# Patient Record
Sex: FEMALE | Race: OTHER | NOT HISPANIC OR LATINO | URBAN - METROPOLITAN AREA
[De-identification: names, ages, dates, MRNs, and addresses within clinical notes are randomized per-mention and may not be internally consistent; named-entity substitution may affect disease eponyms.]

---

## 2014-11-18 RX ORDER — INSULIN GLARGINE 100 [IU]/ML
12 INJECTION, SOLUTION SUBCUTANEOUS
Qty: 0 | Refills: 0 | DISCHARGE
Start: 2014-11-18

## 2017-10-29 ENCOUNTER — INPATIENT (INPATIENT)
Facility: HOSPITAL | Age: 82
LOS: 1 days | Discharge: HOME HEALTH | End: 2017-10-31
Attending: INTERNAL MEDICINE | Admitting: INTERNAL MEDICINE
Payer: MEDICARE

## 2017-10-29 VITALS
WEIGHT: 169.98 LBS | RESPIRATION RATE: 17 BRPM | HEART RATE: 72 BPM | SYSTOLIC BLOOD PRESSURE: 180 MMHG | OXYGEN SATURATION: 100 % | HEIGHT: 60 IN | TEMPERATURE: 99 F | DIASTOLIC BLOOD PRESSURE: 77 MMHG

## 2017-10-29 DIAGNOSIS — E03.9 HYPOTHYROIDISM, UNSPECIFIED: ICD-10-CM

## 2017-10-29 DIAGNOSIS — E66.09 OTHER OBESITY DUE TO EXCESS CALORIES: ICD-10-CM

## 2017-10-29 DIAGNOSIS — Z29.9 ENCOUNTER FOR PROPHYLACTIC MEASURES, UNSPECIFIED: ICD-10-CM

## 2017-10-29 DIAGNOSIS — E11.9 TYPE 2 DIABETES MELLITUS WITHOUT COMPLICATIONS: ICD-10-CM

## 2017-10-29 DIAGNOSIS — Z90.49 ACQUIRED ABSENCE OF OTHER SPECIFIED PARTS OF DIGESTIVE TRACT: Chronic | ICD-10-CM

## 2017-10-29 DIAGNOSIS — I10 ESSENTIAL (PRIMARY) HYPERTENSION: ICD-10-CM

## 2017-10-29 DIAGNOSIS — E78.2 MIXED HYPERLIPIDEMIA: ICD-10-CM

## 2017-10-29 DIAGNOSIS — I63.9 CEREBRAL INFARCTION, UNSPECIFIED: ICD-10-CM

## 2017-10-29 LAB
ALBUMIN SERPL ELPH-MCNC: 3.4 G/DL — SIGNIFICANT CHANGE UP (ref 3.3–5)
ALP SERPL-CCNC: 236 U/L — HIGH (ref 40–120)
ALT FLD-CCNC: 35 U/L — SIGNIFICANT CHANGE UP (ref 12–78)
ANION GAP SERPL CALC-SCNC: 10 MMOL/L — SIGNIFICANT CHANGE UP (ref 5–17)
APTT BLD: 31 SEC — SIGNIFICANT CHANGE UP (ref 27.5–37.4)
AST SERPL-CCNC: 27 U/L — SIGNIFICANT CHANGE UP (ref 15–37)
BASOPHILS # BLD AUTO: 0.1 K/UL — SIGNIFICANT CHANGE UP (ref 0–0.2)
BASOPHILS NFR BLD AUTO: 1.5 % — SIGNIFICANT CHANGE UP (ref 0–2)
BILIRUB SERPL-MCNC: 0.4 MG/DL — SIGNIFICANT CHANGE UP (ref 0.2–1.2)
BUN SERPL-MCNC: 28 MG/DL — HIGH (ref 7–23)
CALCIUM SERPL-MCNC: 9 MG/DL — SIGNIFICANT CHANGE UP (ref 8.5–10.1)
CHLORIDE SERPL-SCNC: 102 MMOL/L — SIGNIFICANT CHANGE UP (ref 96–108)
CK MB BLD-MCNC: 3.7 % — HIGH (ref 0–3.5)
CK MB CFR SERPL CALC: 3.3 NG/ML — SIGNIFICANT CHANGE UP (ref 0.5–3.6)
CK SERPL-CCNC: 89 U/L — SIGNIFICANT CHANGE UP (ref 26–192)
CO2 SERPL-SCNC: 28 MMOL/L — SIGNIFICANT CHANGE UP (ref 22–31)
CREAT SERPL-MCNC: 1.48 MG/DL — HIGH (ref 0.5–1.3)
EOSINOPHIL # BLD AUTO: 0.2 K/UL — SIGNIFICANT CHANGE UP (ref 0–0.5)
EOSINOPHIL NFR BLD AUTO: 2.6 % — SIGNIFICANT CHANGE UP (ref 0–6)
GLUCOSE BLDC GLUCOMTR-MCNC: 117 MG/DL — HIGH (ref 70–99)
GLUCOSE SERPL-MCNC: 133 MG/DL — HIGH (ref 70–99)
HCT VFR BLD CALC: 44.3 % — SIGNIFICANT CHANGE UP (ref 34.5–45)
HGB BLD-MCNC: 14.2 G/DL — SIGNIFICANT CHANGE UP (ref 11.5–15.5)
INR BLD: 1.1 RATIO — SIGNIFICANT CHANGE UP (ref 0.88–1.16)
LYMPHOCYTES # BLD AUTO: 2.7 K/UL — SIGNIFICANT CHANGE UP (ref 1–3.3)
LYMPHOCYTES # BLD AUTO: 34.9 % — SIGNIFICANT CHANGE UP (ref 13–44)
MCHC RBC-ENTMCNC: 26.8 PG — LOW (ref 27–34)
MCHC RBC-ENTMCNC: 32.1 GM/DL — SIGNIFICANT CHANGE UP (ref 32–36)
MCV RBC AUTO: 83.4 FL — SIGNIFICANT CHANGE UP (ref 80–100)
MONOCYTES # BLD AUTO: 0.6 K/UL — SIGNIFICANT CHANGE UP (ref 0–0.9)
MONOCYTES NFR BLD AUTO: 7.3 % — SIGNIFICANT CHANGE UP (ref 2–14)
NEUTROPHILS # BLD AUTO: 4.1 K/UL — SIGNIFICANT CHANGE UP (ref 1.8–7.4)
NEUTROPHILS NFR BLD AUTO: 53.7 % — SIGNIFICANT CHANGE UP (ref 43–77)
PLATELET # BLD AUTO: 308 K/UL — SIGNIFICANT CHANGE UP (ref 150–400)
POTASSIUM SERPL-MCNC: 5.2 MMOL/L — SIGNIFICANT CHANGE UP (ref 3.5–5.3)
POTASSIUM SERPL-SCNC: 5.2 MMOL/L — SIGNIFICANT CHANGE UP (ref 3.5–5.3)
PROT SERPL-MCNC: 9.7 GM/DL — HIGH (ref 6–8.3)
PROTHROM AB SERPL-ACNC: 12 SEC — SIGNIFICANT CHANGE UP (ref 9.8–12.7)
RBC # BLD: 5.32 M/UL — HIGH (ref 3.8–5.2)
RBC # FLD: 13 % — SIGNIFICANT CHANGE UP (ref 11–15)
SODIUM SERPL-SCNC: 140 MMOL/L — SIGNIFICANT CHANGE UP (ref 135–145)
TROPONIN I SERPL-MCNC: 0.02 NG/ML — SIGNIFICANT CHANGE UP (ref 0.01–0.04)
WBC # BLD: 7.6 K/UL — SIGNIFICANT CHANGE UP (ref 3.8–10.5)
WBC # FLD AUTO: 7.6 K/UL — SIGNIFICANT CHANGE UP (ref 3.8–10.5)

## 2017-10-29 PROCEDURE — 71010: CPT | Mod: 26

## 2017-10-29 PROCEDURE — 70450 CT HEAD/BRAIN W/O DYE: CPT | Mod: 26

## 2017-10-29 PROCEDURE — 99223 1ST HOSP IP/OBS HIGH 75: CPT

## 2017-10-29 PROCEDURE — 99285 EMERGENCY DEPT VISIT HI MDM: CPT

## 2017-10-29 RX ORDER — SIMVASTATIN 20 MG/1
40 TABLET, FILM COATED ORAL AT BEDTIME
Qty: 0 | Refills: 0 | Status: DISCONTINUED | OUTPATIENT
Start: 2017-10-29 | End: 2017-10-31

## 2017-10-29 RX ORDER — HEPARIN SODIUM 5000 [USP'U]/ML
5000 INJECTION INTRAVENOUS; SUBCUTANEOUS EVERY 8 HOURS
Qty: 0 | Refills: 0 | Status: DISCONTINUED | OUTPATIENT
Start: 2017-10-29 | End: 2017-10-31

## 2017-10-29 RX ORDER — DEXTROSE 50 % IN WATER 50 %
1 SYRINGE (ML) INTRAVENOUS ONCE
Qty: 0 | Refills: 0 | Status: DISCONTINUED | OUTPATIENT
Start: 2017-10-29 | End: 2017-10-31

## 2017-10-29 RX ORDER — INSULIN GLARGINE 100 [IU]/ML
40 INJECTION, SOLUTION SUBCUTANEOUS AT BEDTIME
Qty: 0 | Refills: 0 | Status: DISCONTINUED | OUTPATIENT
Start: 2017-10-29 | End: 2017-10-30

## 2017-10-29 RX ORDER — INSULIN LISPRO 100/ML
VIAL (ML) SUBCUTANEOUS
Qty: 0 | Refills: 0 | Status: DISCONTINUED | OUTPATIENT
Start: 2017-10-29 | End: 2017-10-31

## 2017-10-29 RX ORDER — ASPIRIN/CALCIUM CARB/MAGNESIUM 324 MG
325 TABLET ORAL ONCE
Qty: 0 | Refills: 0 | Status: COMPLETED | OUTPATIENT
Start: 2017-10-29 | End: 2017-10-29

## 2017-10-29 RX ORDER — ASPIRIN/CALCIUM CARB/MAGNESIUM 324 MG
325 TABLET ORAL DAILY
Qty: 0 | Refills: 0 | Status: DISCONTINUED | OUTPATIENT
Start: 2017-10-29 | End: 2017-10-31

## 2017-10-29 RX ORDER — SODIUM CHLORIDE 9 MG/ML
3 INJECTION INTRAMUSCULAR; INTRAVENOUS; SUBCUTANEOUS ONCE
Qty: 0 | Refills: 0 | Status: COMPLETED | OUTPATIENT
Start: 2017-10-29 | End: 2017-10-29

## 2017-10-29 RX ORDER — DEXTROSE 50 % IN WATER 50 %
12.5 SYRINGE (ML) INTRAVENOUS ONCE
Qty: 0 | Refills: 0 | Status: DISCONTINUED | OUTPATIENT
Start: 2017-10-29 | End: 2017-10-31

## 2017-10-29 RX ORDER — DEXTROSE 50 % IN WATER 50 %
25 SYRINGE (ML) INTRAVENOUS ONCE
Qty: 0 | Refills: 0 | Status: DISCONTINUED | OUTPATIENT
Start: 2017-10-29 | End: 2017-10-31

## 2017-10-29 RX ORDER — DULOXETINE HYDROCHLORIDE 30 MG/1
60 CAPSULE, DELAYED RELEASE ORAL DAILY
Qty: 0 | Refills: 0 | Status: DISCONTINUED | OUTPATIENT
Start: 2017-10-29 | End: 2017-10-31

## 2017-10-29 RX ORDER — GLUCAGON INJECTION, SOLUTION 0.5 MG/.1ML
1 INJECTION, SOLUTION SUBCUTANEOUS ONCE
Qty: 0 | Refills: 0 | Status: DISCONTINUED | OUTPATIENT
Start: 2017-10-29 | End: 2017-10-31

## 2017-10-29 RX ORDER — LEVOTHYROXINE SODIUM 125 MCG
88 TABLET ORAL DAILY
Qty: 0 | Refills: 0 | Status: DISCONTINUED | OUTPATIENT
Start: 2017-10-29 | End: 2017-10-31

## 2017-10-29 RX ORDER — SODIUM CHLORIDE 9 MG/ML
1000 INJECTION, SOLUTION INTRAVENOUS
Qty: 0 | Refills: 0 | Status: DISCONTINUED | OUTPATIENT
Start: 2017-10-29 | End: 2017-10-31

## 2017-10-29 RX ORDER — INSULIN LISPRO 100/ML
VIAL (ML) SUBCUTANEOUS AT BEDTIME
Qty: 0 | Refills: 0 | Status: DISCONTINUED | OUTPATIENT
Start: 2017-10-29 | End: 2017-10-31

## 2017-10-29 RX ADMIN — Medication 325 MILLIGRAM(S): at 20:03

## 2017-10-29 RX ADMIN — HEPARIN SODIUM 5000 UNIT(S): 5000 INJECTION INTRAVENOUS; SUBCUTANEOUS at 22:03

## 2017-10-29 RX ADMIN — SIMVASTATIN 40 MILLIGRAM(S): 20 TABLET, FILM COATED ORAL at 22:04

## 2017-10-29 RX ADMIN — SODIUM CHLORIDE 3 MILLILITER(S): 9 INJECTION INTRAMUSCULAR; INTRAVENOUS; SUBCUTANEOUS at 19:30

## 2017-10-29 RX ADMIN — INSULIN GLARGINE 40 UNIT(S): 100 INJECTION, SOLUTION SUBCUTANEOUS at 22:04

## 2017-10-29 NOTE — H&P ADULT - PROBLEM SELECTOR PLAN 1
Telemetry monitoring  Neurochecks q2hrs.  Symptoms mostly resolved, would still allow for permissive HTN at this point  TTE, carotid doppler  STAT CT head if change in neuro status  Continue simvastatin  Aspiration, Fall, Seizure precautions.  Neurology consult  PT Evaluation.  CT Head reveals no hemorrhage   MRI brain  Send HbA1c, TSH

## 2017-10-29 NOTE — ED PROVIDER NOTE - OBJECTIVE STATEMENT
88 year old female with h/o HTN, DM, TIA, hypothyroidism and hypercholesterolemia presents today BIBA from home accompanied with her grand daughter who states that the patient was last normal at 5:35pm, she was coming out of the shower when she noticed her speech did not sound right, pt had slurred speech, left facial droop and left sided weakness, pt arrived to the ER back to her baseline (-) chest pain (-) sob (-) headache or dizziness, in triage pt is able to follow commands, speech is clear, (-) drift and (-)facial droop

## 2017-10-29 NOTE — H&P ADULT - ASSESSMENT
88 year old right-handed woman with PMH tia, DM2, HTN, HLD, hypothyroidism, presents with complaint of LUE and LLE weakness with facial droop earlier this morning.  Signs, symptoms, and work up consistent with CVA outside window for tPA.  Patient will require admission for at least 2 midnights as detailed below:    IMPROVE VTE Individual Risk Assessment          RISK                                                          Points    [  ] Previous VTE                                                3    [  ] Thrombophilia                                             2    [  ] Lower limb paralysis                                    2        (unable to hold up >15 seconds)      [  ] Current Cancer                                             2         (within 6 months)    [ x ] Immobilization > 24 hrs                              1    [  ] ICU/CCU stay > 24 hours                            1    [ x ] Age > 60                                                    1    IMPROVE VTE Score _____2____

## 2017-10-29 NOTE — H&P ADULT - NSHPLABSRESULTS_GEN_ALL_CORE
Vital Signs Last 24 Hrs  T(C): 37.3 (29 Oct 2017 18:47), Max: 37.3 (29 Oct 2017 18:47)  T(F): 99.1 (29 Oct 2017 18:47), Max: 99.1 (29 Oct 2017 18:47)  HR: 81 (29 Oct 2017 20:28) (72 - 81)  BP: 161/92 (29 Oct 2017 20:28) (161/92 - 180/77)  BP(mean): --  RR: 18 (29 Oct 2017 20:28) (17 - 20)  SpO2: 97% (29 Oct 2017 20:28) (97% - 100%)          LABS:                        14.2   7.6   )-----------( 308      ( 29 Oct 2017 19:32 )             44.3     10-29    140  |  102  |  28<H>  ----------------------------<  133<H>  5.2   |  28  |  1.48<H>    Ca    9.0      29 Oct 2017 19:32    TPro  9.7<H>  /  Alb  3.4  /  TBili  0.4  /  DBili  x   /  AST  27  /  ALT  35  /  AlkPhos  236<H>  10-29    PT/INR - ( 29 Oct 2017 19:32 )   PT: 12.0 sec;   INR: 1.10 ratio         PTT - ( 29 Oct 2017 19:32 )  PTT:31.0 sec      RADIOLOGY & ADDITIONAL STUDIES:    CT head:  IMPRESSION:  Stable head CT. No acute intracranial hemorrhage, mass effect or evidence   of acute territorial infarct.

## 2017-10-29 NOTE — ED ADULT NURSE NOTE - OBJECTIVE STATEMENT
pt presents to  the ED states she had an episode of the left side of her mouth looking "weird" and her friend said she was having a stroke. Pt recalls everything, states she grew very weak and her grandaughter helped her to the chair. She also noted she was dropping things out of her left hand. pt was brought emergently to CT scan. Vitals as noted upon return. 20 ga IV placed in left AC. Labs drawn and sent. Speaks full sentences without slurring. no facial droop noted. Good equal bilat  strength. No arm drift. Sinus arrhythmia noted with first degree HB. will continue to monitor awaiting further orders and results

## 2017-10-29 NOTE — ED ADULT TRIAGE NOTE - CHIEF COMPLAINT QUOTE
at 17:30 family noticed left facial dropping and slurred speech  upon arrival patient moves all extremities speech clear no acute deviation to facial muscles.  ems noted resolved symptoms, Md at triage for evaluation  fs by ems 102

## 2017-10-29 NOTE — H&P ADULT - NSHPPHYSICALEXAM_GEN_ALL_CORE
GENERAL: NAD, well-groomed, well-developed  HEAD:  Atraumatic, Normocephalic  EYES: EOMI, PERRLA, conjunctiva and sclera clear  ENMT: No tonsillar erythema, exudates, or enlargement; Moist mucous membranes, No lesions  NECK: Supple, No JVD, Normal thyroid  NERVOUS SYSTEM:  Alert & Oriented X3, Good concentration DTRs 2+ intact and symmetric, strength 4/5 LUE, 5/5 throughout, CN 2-12 intact  CHEST/LUNG: Clear to auscultation bilaterally; No rales, rhonchi, wheezing, or rubs  HEART: Regular rate and rhythm; No murmurs, rubs, or gallops  ABDOMEN: Soft, Nontender, Nondistended; Bowel sounds present  EXTREMITIES: No clubbing, cyanosis, or edema  SKIN: no rashes or lesions   PSYCH: normal affect and behavior GENERAL: NAD, well-groomed, obese  HEAD:  Atraumatic, Normocephalic  EYES: EOMI, PERRLA, conjunctiva and sclera clear  ENMT: No tonsillar erythema, exudates, or enlargement; Moist mucous membranes, No lesions  NECK: Supple, No JVD, Normal thyroid  NERVOUS SYSTEM:  Alert & Oriented X3, Good concentration DTRs 2+ intact and symmetric, strength 4/5 LUE, 5/5 throughout, CN 2-12 intact  CHEST/LUNG: Clear to auscultation bilaterally; No rales, rhonchi, wheezing, or rubs  HEART: Regular rate and rhythm; No murmurs, rubs, or gallops  ABDOMEN: Soft, Nontender, Nondistended; Bowel sounds present  EXTREMITIES: No clubbing, cyanosis, or edema  SKIN: no rashes or lesions   PSYCH: normal affect and behavior

## 2017-10-29 NOTE — H&P ADULT - NSHPREVIEWOFSYSTEMS_GEN_ALL_CORE
No fever/chills, No photophobia/eye pain,  No chest pain/palpitations, no SOB/cough/wheeze/stridor, No abdominal pain, No N/V/D, no dysuria/frequency/discharge, No neck/back pain, no rash.

## 2017-10-29 NOTE — H&P ADULT - HISTORY OF PRESENT ILLNESS
88 year old right-handed woman with PMH tia, DM2, HTN, HLD, hypothyroidism, presents with complaint of LUE and LLE weakness with facial droop earlier this morning.  She says she was in the bathroom and was about to take a shower when she noticed a feeling of being weak, unsteady on her feet, and was unable to hold tightly to a brush in her left hand.  She also noticed blurred vision and had a headache.  Her granddaughter (not present in ED) told the patient her face was drooping to the left.  Patient says she did not want to come to the ED right away and wanted to wait, then agreed to come.  She says her symptoms have mostly resolved but she still feels weak.  She denies tremors, incontinence, LOC, dizziness, chest pain, palpitations, diaphoresis.    In the ED, EKG shows RBBB, CT head shows no acute changes, cardiac enzymes normal.

## 2017-10-29 NOTE — ED PROVIDER NOTE - PROGRESS NOTE DETAILS
dr espinoza called and made aware of the consult Patient signed out to my care pending labs.  Labs, CT, EKG reviewed, admit for CVA evaluation.  No tpa given age and resolution of symptoms.  Patient is to be admitted to the hospital and the case was discussed with the admitting physician.  Any changes in plan, additional imaging/labs, and further work up will be at the discretion of the admitting physician.

## 2017-10-29 NOTE — H&P ADULT - PMH
DM (diabetes mellitus)    HLD (hyperlipidemia)    HTN (hypertension)    Hypothyroidism    TIA (transient ischemic attack)

## 2017-10-29 NOTE — CONSULT NOTE ADULT - SUBJECTIVE AND OBJECTIVE BOX
Historian:  Patient.   ER notes also reviewed.  HPI:    GENARO PATEL... 88 year old right-handed woman with PMH tia, DM2, HTN, HLD, hypothyroidism, presents with complaint of LUE and LLE weakness with facial droop earlier this morning.  She says she was in the bathroom and was about to take a shower when she noticed a feeling of being weak, unsteady on her feet, and was unable to hold tightly to a brush in her left hand.  She also noticed blurred vision and had a headache.  Her granddaughter (not present in ED) told the patient her face was drooping to the left.  Patient says she did not want to come to the ED right away and wanted to wait, then agreed to come.  She says her symptoms have mostly resolved but she still feels weak.  She denies tremors, incontinence, LOC, dizziness, chest pain, palpitations, diaphoresis.    In the ED, EKG shows RBBB, CT head shows no acute changes, cardiac enzymes normal. (29 Oct 2017 21:22)    PAST MEDICAL & SURGICAL HISTORY:  TIA (transient ischemic attack); Hypothyroidism;DM (diabetes mellitus); HLD (hyperlipidemia); HTN (hypertension); S/P cholecystectomy    MEDICATIONS  (STANDING):  aspirin 325 milliGRAM(s) Oral daily  dextrose 5%. 1000 milliLiter(s) (50 mL/Hr) IV Continuous <Continuous>  dextrose 50% Injectable 12.5 Gram(s) IV Push once  dextrose 50% Injectable 25 Gram(s) IV Push once  dextrose 50% Injectable 25 Gram(s) IV Push once  DULoxetine 60 milliGRAM(s) Oral daily  heparin  Injectable 5000 Unit(s) SubCutaneous every 8 hours  insulin glargine Injectable (LANTUS) 40 Unit(s) SubCutaneous at bedtime  insulin lispro (HumaLOG) corrective regimen sliding scale   SubCutaneous three times a day before meals  insulin lispro (HumaLOG) corrective regimen sliding scale   SubCutaneous at bedtime  levothyroxine 88 MICROGram(s) Oral daily  simvastatin 40 milliGRAM(s) Oral at bedtime    MEDICATIONS  (PRN):  dextrose Gel 1 Dose(s) Oral once PRN Blood Glucose LESS THAN 70 milliGRAM(s)/deciliter  glucagon  Injectable 1 milliGRAM(s) IntraMuscular once PRN Glucose LESS THAN 70 milligrams/deciliter    Allergies: No Known Allergies  Intolerances  FAMILY HISTORY:  Family history unknown    Vital Signs Last 24 Hrs  T(C): 37.3 (29 Oct 2017 18:47), Max: 37.3 (29 Oct 2017 18:47)  T(F): 99.1 (29 Oct 2017 18:47), Max: 99.1 (29 Oct 2017 18:47)  HR: 84 (29 Oct 2017 22:07) (72 - 84)  BP: 171/67 (29 Oct 2017 22:07) (161/92 - 180/77)  BP(mean): --  RR: 16 (29 Oct 2017 22:07) (16 - 20)  SpO2: 95% (29 Oct 2017 22:07) (95% - 100%)    NEUROLOGICAL EXAM:  HENT:  Normocephalic head; atraumatic head.  Neck supple.  ENT: normal looking.  Mental State:    Awake  Fully oriented to person, place and date.  Coherent.  Speech clear and intact.  Cooperative.  Responds appropriately.    Cranial Nerves:  II-XII:   Pupils round and reactive to light and accommodation.  Extraocular movements full.  Visual fields full (no homonymous hemianopsia).  Visual acuity wnl.  Facial symmetry intact.  Tongue midline.  Motor Functions:  Moves all extremities.  No pronator drift of UE.  Claps hands well.  Hand  intact bilaterally.      Sensory Functions:   Intact to touch and pinprick to face and extremities.    Reflexes:  Deep tendon reflexes normoactive to knees and ankles.  Babinski absent   Cerebellar Testing:    Finger to nose intact.  Nystagmus absent.  Neurovascular: Carotid auscultation full without bruits.      LABS:                        14.2   7.6   )-----------( 308      ( 29 Oct 2017 19:32 )             44.3     10-29    140  |  102  |  28<H>  ----------------------------<  133<H>  5.2   |  28  |  1.48<H>    Ca    9.0      29 Oct 2017 19:32    TPro  9.7<H>  /  Alb  3.4  /  TBili  0.4  /  DBili  x   /  AST  27  /  ALT  35  /  AlkPhos  236<H>  10-29    PT/INR - ( 29 Oct 2017 19:32 )   PT: 12.0 sec;   INR: 1.10 ratio         PTT - ( 29 Oct 2017 19:32 )  PTT:31.0 sec    RADIOLOGY & ADDITIONAL STUDIES:    Comprehensive Metabolic Panel: STAT (10-29 @ 18:51)  Complete Blood Count + Automated Diff: STAT (10-29 @ 18:51)  Creatine Kinase, Serum: STAT (10-29 @ 18:51)  CKMB Mass Assay: STAT (10-29 @ 18:51)  Troponin I, Serum: STAT (10-29 @ 18:51)  Activated Partial Thromboplastin Time:  Start Date:29-Oct-2017. STAT (10-29 @ 18:51)  Prothrombin Time and INR, Plasma:  Start Date:29-Oct-2017. STAT (10-29 @ 18:51)  Xray Chest 1 View AP/PA.: Urgent   Indication: Chest Pain  Transport: Portable  Exam Completed  Provider's Contact #: [phone] (10-29 @ 18:51)  Saline Lock Insert.:     Time/Priority:  STAT (10-29 @ 18:51)  sodium chloride 0.9% lock flush:   3 milliLiter(s), IV Push, once, Stop After 1 Doses (10-29 @ 18:51)  Vital Signs:     Frequency:  per routine (10-29 @ 18:51)  Pulse Oximetry:   Frequency: <Continuous>    Additional Instructions:  Notify Provider if oxygen saturation is LESS THAN 92% (10-29 @ 18:51)  Cardiac Monitor Bedside:     Time/Priority:  STAT (10-29 @ 18:51)  Assess Pain:     Frequency:  per routine (10-29 @ 18:51)  Activity - Bedrest (10-29 @ 18:51)  12 Lead ECG:   Provider's Contact #: [phone] (10-29 @ 18:51)  CT Head No Cont: Urgent   Indication: slurred speech, left sided weakness  Transport: Stretcher-Crib  Exam Completed  Provider's Contact #: [phone] (10-29 @ 18:51)  POCT  Blood Glucose: 18:51 (10-29 @ 18:54)  aspirin:   325 milliGRAM(s), Oral, Once, Stop After 1 Doses  Provider's Contact #: [phone] (10-29 @ 19:47)  (Floorstock):   Qty Removed: 1 each (10-29 @ 20:03)  Admit to Inpatient Level of Care:     Service:  TELEMETRY    Physician:  Sim Dozier    Additional Instructions:  Diagnosis: TIA (transient ischemic attack)  Isolation: None  Special Consideration: None (10-29 @ 20:29)  Admit to Inpatient Level of Care:     Service:  Telemetry    Physician:  Humberto Dozier (10-29 @ 20:39)  Remote Telemetry/EKG EXCEPT Off Unit Tests:     Time/Priority:  Routine (10-29 @ 20:39)  Fall Risk Protocol:     Time/Priority:  Routine (10-29 @ 20:39)  Seizure Precautions:     Time/Priority:  Routine (10-29 @ 20:39)  Aspiration Precautions:     Time/Priority:  Routine (10-29 @ 20:39)  Vital Signs:     Frequency:  per routine (10-29 @ 20:39)  Diet, Consistent Carbohydrate w/Evening Snack (10-29 @ 20:40)  heparin  Injectable:   5000 Unit(s), SubCutaneous, every 8 hours  Provider's Contact #: [phone] (10-29 @ 20:40)  DULoxetine: [Known as CYMBALTA]  60 milliGRAM(s), Oral, daily  Administration Instructions: swallow whole * don't crush/chew  This is a Look-alike/Sound-alike Medication  Provider's Contact #: [phone] (10-29 @ 21:21)  insulin glargine Injectable (LANTUS): [Ordered as LANTUS]  40 Unit(s), SubCutaneous, at bedtime  Administration Instructions: NOT to be mixed with other insulins  Dispose unused medication in BLACK bin.  Provider's Contact #: [phone] (10-29 @ 21:21)  simvastatin: [Known as ZOCOR]  40 milliGRAM(s), Oral, at bedtime  Provider's Contact #: [phone] (10-29 @ 21:21)  levothyroxine: [Known as SYNTHROID]  88 MICROGram(s), Oral, daily  Provider's Contact #: [phone] (10-29 @ 21:21)  aspirin:   325 milliGRAM(s), Oral, daily  Provider's Contact #: [phone] (10-29 @ 21:21)  Admit from ED (10-29 @ 21:44)  (ADM OVERRIDE):   Qty Removed: 1 each  Route - Dose Given <see task> (10-29 @ 21:54)  (ADM OVERRIDE):   Qty Removed: 1 each  Route - Dose Given <see task> (10-29 @ 21:54)  Assess Neurological Status:     Type of Neuro Check:  Stroke    Frequency:  every 2 hours (10-29 @ 22:02)  TTE Echo Doppler w/o Cont:   Transport: Stretcher-Crib  Monitor: w/o Monitor  Provider's Contact #: [phone] (10-29 @ 22:02)  US Duplex Carotid Arteries Complete, Bilateral: Routine   Indication: CVA  Transport: Stretcher-Crib  Provider's Contact #: [phone] (10-29 @ 22:02)  Consult- PT Evaluate and Treat:   *Reason for Consult - Must select at least one choice*     Neuro Event  Weight Bearing Restrictions: No  Additional Information: CVA-like symptoms, work up in progress (10-29 @ 22:02)  MRI Head w/o Cont: Routine   Indication: CVA  Transport: Stretcher-Crib  Provider's Contact #: [phone] (10-29 @ 22:02)  Blood Glucose Point Of Care Testing:     Frequency:  Before meals and at bedtime    Additional Instructions:  Before meals and at bedtime (10-29 @ 22:02)  Blood Glucose Point Of Care Testing:     Frequency:  every 15 minutes    Additional Instructions:  After carbohydrate administration for hypoglycemia, repeat every 15 minute(s) until blood glucose is GREATER THAN or EQUAL  milliGRAM(s)/deciLiter twice consecutively (10-29 @ 22:02)  Notify Provider For:     Additional Instructions:  Blood glucose LESS THAN 70 milliGRAM(s)/deciLiter or GREATER THAN 400 milliGRAM(s)/deciLiter (10-29 @ 22:02)  Education:     Diabetes    Other: Diet, Exercise    Additional Instructions: Diet, Exercise, Diabetes (10-29 @ 22:02)  insulin lispro (HumaLOG) corrective regimen sliding scale:       2 Unit(s) if Glucose 151 - 200      4 Unit(s) if Glucose 201 - 250      6 Unit(s) if Glucose 251 - 300      8 Unit(s) if Glucose 301 - 350      10 Unit(s) if Glucose 351 - 400      12 Unit(s) if Glucose Greater Than 400 + Contact MD  SubCutaneous, three times a day before meals  Special Instructions: Give correctional scale insulin REGARDLESS of PO status NOTIFY Provider for blood glucose LESS THAN 70 milliGRAM(s)/deciLiter or above 400 milliGRAM(s)/deciLiter.  Administration Instructions: *Per Sliding Scale*  Dispose unused medication in BLACK bin.  This is a Look-alike/Sound-alike Medication  Provider's Contact #: [phone] (10-29 @ 22:02)  insulin lispro (HumaLOG) corrective regimen sliding scale:       0 Unit(s) if Glucose 0 - 250      1 Unit(s) if Glucose 251 - 300      2 Unit(s) if Glucose 301 - 350      3 Unit(s) if Glucose 351 - 400      4 Unit(s) if Glucose Greater Than 400 + Contact Provider  SubCutaneous, at bedtime  Special Instructions: Give correctional scale insulin REGARDLESS of PO status NOTIFY Provider for blood glucose LESS THAN 70 milliGRAM(s)/deciLiter or above 400 milliGRAM(s)/deciLiter.  Administration Instructions: Dispose unused medication in BLACK bin.  This is a Look-alike/Sound-alike Medication  Provider's Contact #: [phone] (10-29 @ 22:02)  dextrose 5%.: Solution, 1000 milliLiter(s) infuse at 50 mL/Hr  Special Instructions: Conditional Order: HYPOGLYCEMIA PROTOCOL  Provider's Contact #: [phone] (10-29 @ 22:02)  Administer Carbohydrates:     Additional Instructions:  HYPOGLYCEMIA PROTOCOL (10-29 @ 22:02)  dextrose Gel: [Known as GLUTOSE]  1 Dose(s), Oral, once, PRN for Blood Glucose LESS THAN 70 milliGRAM(s)/deciliter, Stop After 1 Doses  Special Instructions: Conditional Order: HYPOGLYCEMIA PROTOCOL  Provider's Contact #: [phone] (10-29 @ 22:02)  dextrose 50% Injectable:   12.5 Gram(s), IV Push, once, Stop After 1 Doses  Special Instructions: Conditional Order: HYPOGLYCEMIA PROTOCOL  Provider's Contact #: [phone] (10-29 @ 22:02)  dextrose 50% Injectable:   25 Gram(s), IV Push, once, Stop After 1 Doses  Special Instructions: Conditional Order: HYPOGLYCEMIA PROTOCOL  Provider's Contact #: [phone] (10-29 @ 22:02)  dextrose 50% Injectable:   25 Gram(s), IV Push, once, Stop After 1 Doses  Special Instructions: Conditional Order: HYPOGLYCEMIA PROTOCOL  Provider's Contact #: [phone] (10-29 @ 22:02)  glucagon  Injectable:   1 milliGRAM(s), IntraMuscular, once, PRN for Glucose LESS THAN 70 milligrams/deciliter, Stop After 1 Doses  Special Instructions: Conditional Order: HYPOGLYCEMIA PROTOCOL  Provider's Contact #: [phone] (10-29 @ 22:02)  Provider to RN:       UNRESPONSIVE patient and Blood Glucose LESS THAN 70 milliGRAM(s)/deciLiter call Rapid Response.  HYPOGLYCEMIA PROTOCOL (10-29 @ 22:02)  Lipid Profile: 08:00 (10-29 @ 22:02)  Hemoglobin A1C, Whole Blood: 08:00 (10-29 @ 22:02)  Complete Blood Count: 08:00 (10-29 @ 22:02)  Basic Metabolic Panel: 08:00 (10-29 @ 22:02)  Thyroid Stimulating Hormone, Serum: 08:00 (10-29 @ 22:02)  Consult- Nutrition Services:      Assessment     Education (10-29 @ 22:02)  POCT  Blood Glucose: 22:11 (10-29 @ 22:13)    Assessment & Opinion:   Transient Ischemic Attack,  Presently with normal neuro examination.  Recommendations:  Brain MRI.  Carotid doppler.  Echocardiogram.  EEG.   DVT prophylaxis as ordered.  Medications:  Continue Aspirin as ordered.  Will folow

## 2017-10-30 ENCOUNTER — TRANSCRIPTION ENCOUNTER (OUTPATIENT)
Age: 82
End: 2017-10-30

## 2017-10-30 LAB
ANION GAP SERPL CALC-SCNC: 10 MMOL/L — SIGNIFICANT CHANGE UP (ref 5–17)
BUN SERPL-MCNC: 28 MG/DL — HIGH (ref 7–23)
CALCIUM SERPL-MCNC: 9 MG/DL — SIGNIFICANT CHANGE UP (ref 8.5–10.1)
CHLORIDE SERPL-SCNC: 105 MMOL/L — SIGNIFICANT CHANGE UP (ref 96–108)
CHOLEST SERPL-MCNC: 198 MG/DL — SIGNIFICANT CHANGE UP (ref 10–199)
CO2 SERPL-SCNC: 26 MMOL/L — SIGNIFICANT CHANGE UP (ref 22–31)
CREAT SERPL-MCNC: 1.26 MG/DL — SIGNIFICANT CHANGE UP (ref 0.5–1.3)
GLUCOSE SERPL-MCNC: 71 MG/DL — SIGNIFICANT CHANGE UP (ref 70–99)
HBA1C BLD-MCNC: 9.4 % — HIGH (ref 4–5.6)
HCT VFR BLD CALC: 43.2 % — SIGNIFICANT CHANGE UP (ref 34.5–45)
HDLC SERPL-MCNC: 45 MG/DL — SIGNIFICANT CHANGE UP (ref 40–125)
HGB BLD-MCNC: 13.6 G/DL — SIGNIFICANT CHANGE UP (ref 11.5–15.5)
LIPID PNL WITH DIRECT LDL SERPL: 129 MG/DL — SIGNIFICANT CHANGE UP
MCHC RBC-ENTMCNC: 27.1 PG — SIGNIFICANT CHANGE UP (ref 27–34)
MCHC RBC-ENTMCNC: 31.4 GM/DL — LOW (ref 32–36)
MCV RBC AUTO: 86.1 FL — SIGNIFICANT CHANGE UP (ref 80–100)
PLATELET # BLD AUTO: 270 K/UL — SIGNIFICANT CHANGE UP (ref 150–400)
POTASSIUM SERPL-MCNC: 4.6 MMOL/L — SIGNIFICANT CHANGE UP (ref 3.5–5.3)
POTASSIUM SERPL-SCNC: 4.6 MMOL/L — SIGNIFICANT CHANGE UP (ref 3.5–5.3)
RBC # BLD: 5.02 M/UL — SIGNIFICANT CHANGE UP (ref 3.8–5.2)
RBC # FLD: 12.9 % — SIGNIFICANT CHANGE UP (ref 11–15)
SODIUM SERPL-SCNC: 141 MMOL/L — SIGNIFICANT CHANGE UP (ref 135–145)
TOTAL CHOLESTEROL/HDL RATIO MEASUREMENT: 4.4 RATIO — SIGNIFICANT CHANGE UP (ref 3.3–7.1)
TRIGL SERPL-MCNC: 118 MG/DL — SIGNIFICANT CHANGE UP (ref 10–149)
TSH SERPL-MCNC: 0.5 UIU/ML — SIGNIFICANT CHANGE UP (ref 0.36–3.74)
WBC # BLD: 7.1 K/UL — SIGNIFICANT CHANGE UP (ref 3.8–10.5)
WBC # FLD AUTO: 7.1 K/UL — SIGNIFICANT CHANGE UP (ref 3.8–10.5)

## 2017-10-30 PROCEDURE — 93880 EXTRACRANIAL BILAT STUDY: CPT | Mod: 26

## 2017-10-30 PROCEDURE — 70551 MRI BRAIN STEM W/O DYE: CPT | Mod: 26

## 2017-10-30 PROCEDURE — 99233 SBSQ HOSP IP/OBS HIGH 50: CPT

## 2017-10-30 RX ORDER — INSULIN GLARGINE 100 [IU]/ML
35 INJECTION, SOLUTION SUBCUTANEOUS AT BEDTIME
Qty: 0 | Refills: 0 | Status: DISCONTINUED | OUTPATIENT
Start: 2017-10-30 | End: 2017-10-31

## 2017-10-30 RX ORDER — ISOSORBIDE MONONITRATE 60 MG/1
60 TABLET, EXTENDED RELEASE ORAL DAILY
Qty: 0 | Refills: 0 | Status: DISCONTINUED | OUTPATIENT
Start: 2017-10-30 | End: 2017-10-31

## 2017-10-30 RX ORDER — DEXTROSE 50 % IN WATER 50 %
1 SYRINGE (ML) INTRAVENOUS ONCE
Qty: 0 | Refills: 0 | Status: COMPLETED | OUTPATIENT
Start: 2017-10-30 | End: 2017-10-30

## 2017-10-30 RX ADMIN — Medication 325 MILLIGRAM(S): at 13:12

## 2017-10-30 RX ADMIN — SIMVASTATIN 40 MILLIGRAM(S): 20 TABLET, FILM COATED ORAL at 22:04

## 2017-10-30 RX ADMIN — HEPARIN SODIUM 5000 UNIT(S): 5000 INJECTION INTRAVENOUS; SUBCUTANEOUS at 22:04

## 2017-10-30 RX ADMIN — Medication 88 MICROGRAM(S): at 05:59

## 2017-10-30 RX ADMIN — DULOXETINE HYDROCHLORIDE 60 MILLIGRAM(S): 30 CAPSULE, DELAYED RELEASE ORAL at 13:12

## 2017-10-30 RX ADMIN — Medication 10 MILLIGRAM(S): at 16:57

## 2017-10-30 RX ADMIN — Medication 1: at 22:03

## 2017-10-30 RX ADMIN — ISOSORBIDE MONONITRATE 60 MILLIGRAM(S): 60 TABLET, EXTENDED RELEASE ORAL at 22:04

## 2017-10-30 RX ADMIN — Medication 1 DOSE(S): at 08:43

## 2017-10-30 RX ADMIN — HEPARIN SODIUM 5000 UNIT(S): 5000 INJECTION INTRAVENOUS; SUBCUTANEOUS at 05:59

## 2017-10-30 RX ADMIN — HEPARIN SODIUM 5000 UNIT(S): 5000 INJECTION INTRAVENOUS; SUBCUTANEOUS at 14:48

## 2017-10-30 RX ADMIN — INSULIN GLARGINE 35 UNIT(S): 100 INJECTION, SOLUTION SUBCUTANEOUS at 22:04

## 2017-10-30 RX ADMIN — Medication 4: at 17:13

## 2017-10-30 NOTE — PHYSICAL THERAPY INITIAL EVALUATION ADULT - CRITERIA FOR SKILLED THERAPEUTIC INTERVENTIONS
impairments found/risk reduction/prevention/rehab potential/therapy frequency/anticipated discharge recommendation/functional limitations in following categories/NIHS Score 0/42, indicating no stroke severity

## 2017-10-30 NOTE — PHYSICAL THERAPY INITIAL EVALUATION ADULT - PLANNED THERAPY INTERVENTIONS, PT EVAL
neuromuscular re-education/strengthening/transfer training/ROM/gait training/postural re-education/stretching/balance training/bed mobility training

## 2017-10-30 NOTE — DISCHARGE NOTE ADULT - MEDICATION SUMMARY - MEDICATIONS TO TAKE
I will START or STAY ON the medications listed below when I get home from the hospital:    Aspir 81 oral delayed release tablet  -- 1 tab(s) by mouth once a day   -- Swallow whole.  Do not crush.  Take with food or milk.    -- Indication: For TIA    enalapril 10 mg oral tablet  -- 1 tab(s) by mouth once a day  -- Indication: For Essential hypertension    isosorbide mononitrate 60 mg oral tablet, extended release  -- 1 tab(s) by mouth once a day (in the morning)  -- Indication: For Essential hypertension    Cymbalta 60 mg oral delayed release capsule  -- 1 cap(s) by mouth once a day  -- Indication: For Mood    insulin glargine 100 units/mL subcutaneous solution  -- 40 unit(s) subcutaneous once a day (at bedtime)  -- Indication: For dm    Apidra SoloStar Pen 100 units/mL subcutaneous solution  -- 14u before Lunch, 16u before Dinner   -- Indication: For dm    simvastatin  -- 40 milligram(s) by mouth once a day  -- Indication: For hld    levothyroxine  -- 88 microgram(s) by mouth once a day  -- Indication: For hypothyroid

## 2017-10-30 NOTE — DISCHARGE NOTE ADULT - HOSPITAL COURSE
88 year old right-handed woman with PMH tia, DM2, HTN, HLD, hypothyroidism, presents with complaint of LUE and LLE weakness with facial droop earlier this morning.  Signs, symptoms, and work up consistent with CVA outside window for tPA.  P     Problem/Plan - 1:  ·  Problem: Cerebrovascular accident (CVA), unspecified mechanism.  Plan: Telemetry monitoring  Symptoms mostly resolved,  TTE, carotid doppler normal  mri w no acute changes  Continue simvastatin  home w pt, neuro as outpatient      Problem/Plan - 2:  ·  Problem: Essential hypertension.  Plan: cw home meds      Problem/Plan - 3:  ·  Problem: Type 2 diabetes mellitus without complication, with long-term current use of insulin.  Plan: home meds     Problem/Plan - 4:  ·  Problem: Mixed hyperlipidemia.  Plan: Continue statin.      Problem/Plan - 5:  ·  Problem: Acquired hypothyroidism.  Plan: Continue Synthroid  Follow TSH.      Problem/Plan - 6:  Problem: Non morbid obesity due to excess calories. Plan: Diet and exercise as tolerated        Attending Attestation:   45 minutes spent on total d/c encounter; more than 50% of the visit was spent counseling and/or coordinating care by the attending physician.

## 2017-10-30 NOTE — PHYSICAL THERAPY INITIAL EVALUATION ADULT - IMPAIRMENTS FOUND, PT EVAL
visual motor/sensory integrity/muscle strength/ergonomics and body mechanics/gait, locomotion, and balance/gross motor/joint integrity and mobility/fine motor/ROM/posture

## 2017-10-30 NOTE — PHYSICAL THERAPY INITIAL EVALUATION ADULT - GENERAL OBSERVATIONS, REHAB EVAL
Patient encountered supine in stretcher bed from diagnostic imaging, vital signs as charted. RN Caleb aware of high BP. Patient asymptomatic. Attachments: cardiac monitor. AAOx4. Denies pain or shortness of breath.

## 2017-10-30 NOTE — PHYSICAL THERAPY INITIAL EVALUATION ADULT - PERTINENT HX OF CURRENT PROBLEM, REHAB EVAL
Patient brought in due to slurred speech and facial droop c left upper limb weakness. Chart reviewed and noted CT head ruled out acute changes; USD bilateral carotids ruled out stenosis.

## 2017-10-30 NOTE — PHYSICAL THERAPY INITIAL EVALUATION ADULT - ADDITIONAL COMMENTS
As per patient, lives c daughter in private house c 3 stair steps to enter. Level marek within. Used a cane or rolling walker at home. Has home health aide services part-time in the day.

## 2017-10-30 NOTE — PHYSICAL THERAPY INITIAL EVALUATION ADULT - MODIFIED CLINICAL TEST OF SENSORY INTEGRATION IN BALANCE TEST
Barthel Index: Feeding Score _10__, Bathing Score _0__, Grooming Score _0__, Dressing Score _5_, Bowels Score _5__, Bladder Score _5_, Toilet Score _5__, Transfers Score _10__, Mobility Score _0__, Stairs Score _0__,     Total Score __40_

## 2017-10-30 NOTE — DISCHARGE NOTE ADULT - PATIENT PORTAL LINK FT
“You can access the Follow[facility] Patient Portal, offered by [facility], by registering with the following website: [email or web address]”

## 2017-10-30 NOTE — PROGRESS NOTE ADULT - ASSESSMENT
88 year old right-handed woman with PMH tia, DM2, HTN, HLD, hypothyroidism, presents with complaint of LUE and LLE weakness with facial droop earlier this morning.  Signs, symptoms, and work up consistent with CVA outside window for tPA.  P

## 2017-10-30 NOTE — PROGRESS NOTE ADULT - PROBLEM SELECTOR PLAN 1
Telemetry monitoring  Neurochecks q2hrs.  Symptoms mostly resolved, would still allow for permissive HTN at this point  TTE, carotid doppler normal  STAT CT head if change in neuro status  Continue simvastatin  Aspiration, Fall, Seizure precautions.  Neurology consult following  PT Evaluation.  CT Head reveals no hemorrhage   MRI brain pending

## 2017-10-30 NOTE — DISCHARGE NOTE ADULT - SECONDARY DIAGNOSIS.
Acquired hypothyroidism Type 2 diabetes mellitus without complication, with long-term current use of insulin Mixed hyperlipidemia Non morbid obesity due to excess calories Essential hypertension

## 2017-10-30 NOTE — PHYSICAL THERAPY INITIAL EVALUATION ADULT - ORIENTATION, REHAB EVAL
Pt. is able to do complex problem solving but is limited owing to limited flexibility, insight, social behavior, and endurance. Pt. is susceptible to breakdown of behavior outside of a structured setting (e.g., school or work). In stressful situations, shows reserved cognitive functions. Cognitive processing is slow./oriented to person, place, time and situation

## 2017-10-30 NOTE — DISCHARGE NOTE ADULT - CARE PLAN
Principal Discharge DX:	TIA (transient ischemic attack)  Goal:	f/u with neuro  Instructions for follow-up, activity and diet:	f/u with neuro, activity as tolerated, home pt, heart healthy diabetic diet  Secondary Diagnosis:	Acquired hypothyroidism  Secondary Diagnosis:	Type 2 diabetes mellitus without complication, with long-term current use of insulin  Secondary Diagnosis:	Mixed hyperlipidemia  Secondary Diagnosis:	Non morbid obesity due to excess calories  Secondary Diagnosis:	Essential hypertension

## 2017-10-30 NOTE — PROGRESS NOTE ADULT - PROBLEM SELECTOR PLAN 3
on 40un at home, fs on lower side will bring dose down to 35 tonight   Sliding scale with coverage   CHO diet

## 2017-10-30 NOTE — PHYSICAL THERAPY INITIAL EVALUATION ADULT - DIAGNOSIS, PT EVAL
Z86.73 Personal history of transient ischemic attack (TIA), and cerebral infarction without residual deficits

## 2017-10-30 NOTE — PROGRESS NOTE ADULT - SUBJECTIVE AND OBJECTIVE BOX
INTERVAL HPI/OVERNIGHT EVENTS:  Subjective Complaints:  Offers no complaints.  Sitting up in bed with no focal motor sensory deficits.    RECENT RADIOLOGY & ADDITIONAL TESTS:  Carotids neg for stenosis.  Echo shows a 55-60 percent LVEF.    NEUROLOGICAL EXAM (Pertinent):  Vital Signs Last 24 Hrs  T(C): 37.1 (30 Oct 2017 13:20), Max: 37.7 (30 Oct 2017 01:10)  T(F): 98.8 (30 Oct 2017 13:20), Max: 99.8 (30 Oct 2017 01:10)  HR: 82 (30 Oct 2017 13:20) (68 - 90)  BP: 185/83 (30 Oct 2017 14:18) (143/65 - 201/105)  BP(mean): --  RR: 18 (30 Oct 2017 13:20) (16 - 19)  SpO2: 97% (30 Oct 2017 13:20) (94% - 97%)    MEDICATIONS  (STANDING):  aspirin 325 milliGRAM(s) Oral daily  dextrose 5%. 1000 milliLiter(s) (50 mL/Hr) IV Continuous <Continuous>  dextrose 50% Injectable 12.5 Gram(s) IV Push once  dextrose 50% Injectable 25 Gram(s) IV Push once  dextrose 50% Injectable 25 Gram(s) IV Push once  DULoxetine 60 milliGRAM(s) Oral daily  enalapril 10 milliGRAM(s) Oral daily  heparin  Injectable 5000 Unit(s) SubCutaneous every 8 hours  insulin glargine Injectable (LANTUS) 35 Unit(s) SubCutaneous at bedtime  insulin lispro (HumaLOG) corrective regimen sliding scale   SubCutaneous three times a day before meals  insulin lispro (HumaLOG) corrective regimen sliding scale   SubCutaneous at bedtime  isosorbide   mononitrate ER Tablet (IMDUR) 60 milliGRAM(s) Oral daily  levothyroxine 88 MICROGram(s) Oral daily  simvastatin 40 milliGRAM(s) Oral at bedtime    MEDICATIONS  (PRN):  dextrose Gel 1 Dose(s) Oral once PRN Blood Glucose LESS THAN 70 milliGRAM(s)/deciliter  glucagon  Injectable 1 milliGRAM(s) IntraMuscular once PRN Glucose LESS THAN 70 milligrams/deciliter      Assessment & Opinion:   Transient Ischemic Attack,  Presently with normal neuro examination.  Recommendations:  Brain MRI.  EEG.   DVT prophylaxis as ordered.  Medications:  Continue Aspirin as ordered.  Will follow
Patient is a 88y old  Female who presents with a chief complaint of left arm and leg weakness, left facial droop (29 Oct 2017 21:22)      INTERVAL HPI/OVERNIGHT EVENTS: no overnight events     MEDICATIONS  (STANDING):  aspirin 325 milliGRAM(s) Oral daily  dextrose 5%. 1000 milliLiter(s) (50 mL/Hr) IV Continuous <Continuous>  dextrose 50% Injectable 12.5 Gram(s) IV Push once  dextrose 50% Injectable 25 Gram(s) IV Push once  dextrose 50% Injectable 25 Gram(s) IV Push once  DULoxetine 60 milliGRAM(s) Oral daily  heparin  Injectable 5000 Unit(s) SubCutaneous every 8 hours  insulin glargine Injectable (LANTUS) 40 Unit(s) SubCutaneous at bedtime  insulin lispro (HumaLOG) corrective regimen sliding scale   SubCutaneous three times a day before meals  insulin lispro (HumaLOG) corrective regimen sliding scale   SubCutaneous at bedtime  levothyroxine 88 MICROGram(s) Oral daily  simvastatin 40 milliGRAM(s) Oral at bedtime    MEDICATIONS  (PRN):  dextrose Gel 1 Dose(s) Oral once PRN Blood Glucose LESS THAN 70 milliGRAM(s)/deciliter  glucagon  Injectable 1 milliGRAM(s) IntraMuscular once PRN Glucose LESS THAN 70 milligrams/deciliter      Allergies    No Known Allergies    Intolerances        REVIEW OF SYSTEMS:  CONSTITUTIONAL: No fever, weight loss, or fatigue  EYES: No eye pain, visual disturbances, or discharge  ENMT:  No difficulty hearing, tinnitus, vertigo; No sinus or throat pain  NECK: No pain or stiffness  BREASTS: No pain, masses, or nipple discharge  RESPIRATORY: No cough, wheezing, chills or hemoptysis; No shortness of breath  CARDIOVASCULAR: No chest pain, palpitations, dizziness, or leg swelling  GASTROINTESTINAL: No abdominal or epigastric pain. No nausea, vomiting, or hematemesis; No diarrhea or constipation. No melena or hematochezia.  GENITOURINARY: No dysuria, frequency, hematuria, or incontinence  NEUROLOGICAL: No headaches, memory loss, loss of strength, numbness, or tremors  SKIN: No itching, burning, rashes, or lesions   LYMPH NODES: No enlarged glands  ENDOCRINE: No heat or cold intolerance; No hair loss  MUSCULOSKELETAL: No joint pain or swelling; No muscle, back, or extremity pain  PSYCHIATRIC: No depression, anxiety, mood swings, or difficulty sleeping  HEME/LYMPH: No easy bruising, or bleeding gums  ALLERGY AND IMMUNOLOGIC: No hives or eczema    Vital Signs Last 24 Hrs  T(C): 37.7 (30 Oct 2017 05:21), Max: 37.7 (30 Oct 2017 01:10)  T(F): 99.8 (30 Oct 2017 05:21), Max: 99.8 (30 Oct 2017 01:10)  HR: 68 (30 Oct 2017 05:21) (68 - 84)  BP: 143/65 (30 Oct 2017 05:21) (143/65 - 180/77)  BP(mean): --  RR: 18 (30 Oct 2017 05:21) (16 - 20)  SpO2: 96% (30 Oct 2017 05:21) (94% - 100%)    PHYSICAL EXAM:  GENERAL: NAD, well-groomed, well-developed  HEAD:  Atraumatic, Normocephalic  EYES: EOMI, PERRLA, conjunctiva and sclera clear  ENMT: No tonsillar erythema, exudates, or enlargement; Moist mucous membranes, Good dentition, No lesions  NECK: Supple, No JVD, Normal thyroid  NERVOUS SYSTEM:  Alert & Oriented X3, Good concentration; Motor Strength 5/5 B/L upper and lower extremities; DTRs 2+ intact and symmetric  CHEST/LUNG: Clear to percussion bilaterally; No rales, rhonchi, wheezing, or rubs  HEART: Regular rate and rhythm; No murmurs, rubs, or gallops  ABDOMEN: Soft, Nontender, Nondistended; Bowel sounds present  EXTREMITIES:  2+ Peripheral Pulses, No clubbing, cyanosis, or edema  LYMPH: No lymphadenopathy noted  SKIN: No rashes or lesions    LABS:                        13.6   7.1   )-----------( 270      ( 30 Oct 2017 07:51 )             43.2     10-30    141  |  105  |  28<H>  ----------------------------<  71  4.6   |  26  |  1.26    Ca    9.0      30 Oct 2017 07:51    TPro  9.7<H>  /  Alb  3.4  /  TBili  0.4  /  DBili  x   /  AST  27  /  ALT  35  /  AlkPhos  236<H>  10-29    PT/INR - ( 29 Oct 2017 19:32 )   PT: 12.0 sec;   INR: 1.10 ratio         PTT - ( 29 Oct 2017 19:32 )  PTT:31.0 sec    CAPILLARY BLOOD GLUCOSE  103 (29 Oct 2017 22:07)  117 (29 Oct 2017 18:47)      POCT Blood Glucose.: 120 mg/dL (30 Oct 2017 10:04)  POCT Blood Glucose.: 64 mg/dL (30 Oct 2017 08:26)  POCT Blood Glucose.: 63 mg/dL (30 Oct 2017 08:24)  POCT Blood Glucose.: 103 mg/dL (29 Oct 2017 22:11)  POCT Blood Glucose.: 117 mg/dL (29 Oct 2017 18:51)      RADIOLOGY & ADDITIONAL TESTS:    Imaging Personally Reviewed:  [ ] YES  [ ] NO    Consultant(s) Notes Reviewed:  [ ] YES  [ ] NO    Care Discussed with Consultants/Other Providers [ ] YES  [ ] NO

## 2017-10-31 VITALS
RESPIRATION RATE: 17 BRPM | TEMPERATURE: 99 F | SYSTOLIC BLOOD PRESSURE: 144 MMHG | DIASTOLIC BLOOD PRESSURE: 77 MMHG | OXYGEN SATURATION: 95 % | HEART RATE: 81 BPM

## 2017-10-31 PROCEDURE — 70450 CT HEAD/BRAIN W/O DYE: CPT | Mod: 26

## 2017-10-31 PROCEDURE — 99239 HOSP IP/OBS DSCHRG MGMT >30: CPT

## 2017-10-31 RX ORDER — ACETAMINOPHEN 500 MG
650 TABLET ORAL ONCE
Qty: 0 | Refills: 0 | Status: COMPLETED | OUTPATIENT
Start: 2017-10-31 | End: 2017-10-31

## 2017-10-31 RX ORDER — ASPIRIN/CALCIUM CARB/MAGNESIUM 324 MG
1 TABLET ORAL
Qty: 30 | Refills: 0
Start: 2017-10-31 | End: 2017-11-30

## 2017-10-31 RX ADMIN — Medication 650 MILLIGRAM(S): at 12:54

## 2017-10-31 RX ADMIN — Medication 88 MICROGRAM(S): at 05:27

## 2017-10-31 RX ADMIN — Medication 325 MILLIGRAM(S): at 11:42

## 2017-10-31 RX ADMIN — HEPARIN SODIUM 5000 UNIT(S): 5000 INJECTION INTRAVENOUS; SUBCUTANEOUS at 14:56

## 2017-10-31 RX ADMIN — HEPARIN SODIUM 5000 UNIT(S): 5000 INJECTION INTRAVENOUS; SUBCUTANEOUS at 05:27

## 2017-10-31 RX ADMIN — DULOXETINE HYDROCHLORIDE 60 MILLIGRAM(S): 30 CAPSULE, DELAYED RELEASE ORAL at 11:45

## 2017-10-31 RX ADMIN — Medication 650 MILLIGRAM(S): at 11:38

## 2017-10-31 RX ADMIN — Medication 10 MILLIGRAM(S): at 05:27

## 2017-10-31 RX ADMIN — ISOSORBIDE MONONITRATE 60 MILLIGRAM(S): 60 TABLET, EXTENDED RELEASE ORAL at 14:56

## 2017-10-31 NOTE — DIETITIAN INITIAL EVALUATION ADULT. - PERTINENT LABORATORY DATA
10-30 Na141 mmol/L Glu 71 mg/dL K+ 4.6 mmol/L Cr  1.26 mg/dL BUN 28 mg/dL<H> Phos n/a   Alb n/a   PAB n/a, A1c (10/30) 9.4 H, POCT (10/31) 102 H, (10/30) 279 H, 228 H, 120 H; GFR (44) L, Protein 9.7 H (10/29), 10-30 Chol 198  HDL 45 Trig 118

## 2017-10-31 NOTE — DIETITIAN INITIAL EVALUATION ADULT. - OTHER INFO
pt seen today (10/30) due to referral by MD for assessment and education. pt presented as confused this morning c 25% intake. pt stated medium appetite. no family present at this time. pt seen today (10/31) due to referral by MD for assessment and education. pt presented as confused this morning c 25% intake. pt stated medium appetite. no family present at this time. Last BM x2 (10/30).

## 2017-10-31 NOTE — DIETITIAN INITIAL EVALUATION ADULT. - NS AS NUTRI INTERV ED CONTENT
Survival information/Purpose of the nutrition education/wt loss tips and T2DM nutrition therapy written materials left at bedside for family

## 2017-10-31 NOTE — DIETITIAN INITIAL EVALUATION ADULT. - ENERGY NEEDS
Height (cm): 152.4 (10-29)  Weight (kg): 99.8 (10-30)  BMI (kg/m2): 43.0 (10-31)  IBW: 45.5 kg +/- 10%  % IBW: 200%   UBW: unknown  %UBW: unknown

## 2017-11-01 ENCOUNTER — OUTPATIENT (OUTPATIENT)
Dept: OUTPATIENT SERVICES | Facility: HOSPITAL | Age: 82
LOS: 1 days | End: 2017-11-01
Payer: COMMERCIAL

## 2017-11-01 DIAGNOSIS — Z90.49 ACQUIRED ABSENCE OF OTHER SPECIFIED PARTS OF DIGESTIVE TRACT: Chronic | ICD-10-CM

## 2017-11-01 DIAGNOSIS — R69 ILLNESS, UNSPECIFIED: ICD-10-CM

## 2017-11-01 PROBLEM — G45.9 TRANSIENT CEREBRAL ISCHEMIC ATTACK, UNSPECIFIED: Chronic | Status: ACTIVE | Noted: 2017-10-29

## 2017-11-01 PROCEDURE — G9001: CPT

## 2017-11-03 DIAGNOSIS — I45.10 UNSPECIFIED RIGHT BUNDLE-BRANCH BLOCK: ICD-10-CM

## 2017-11-03 DIAGNOSIS — G45.9 TRANSIENT CEREBRAL ISCHEMIC ATTACK, UNSPECIFIED: ICD-10-CM

## 2017-11-03 DIAGNOSIS — E66.09 OTHER OBESITY DUE TO EXCESS CALORIES: ICD-10-CM

## 2017-11-03 DIAGNOSIS — E78.00 PURE HYPERCHOLESTEROLEMIA, UNSPECIFIED: ICD-10-CM

## 2017-11-03 DIAGNOSIS — Z86.73 PERSONAL HISTORY OF TRANSIENT ISCHEMIC ATTACK (TIA), AND CEREBRAL INFARCTION WITHOUT RESIDUAL DEFICITS: ICD-10-CM

## 2017-11-03 DIAGNOSIS — I10 ESSENTIAL (PRIMARY) HYPERTENSION: ICD-10-CM

## 2017-11-03 DIAGNOSIS — E78.2 MIXED HYPERLIPIDEMIA: ICD-10-CM

## 2017-11-03 DIAGNOSIS — Z79.4 LONG TERM (CURRENT) USE OF INSULIN: ICD-10-CM

## 2017-11-03 DIAGNOSIS — E11.9 TYPE 2 DIABETES MELLITUS WITHOUT COMPLICATIONS: ICD-10-CM

## 2017-11-03 DIAGNOSIS — Z90.49 ACQUIRED ABSENCE OF OTHER SPECIFIED PARTS OF DIGESTIVE TRACT: ICD-10-CM

## 2017-11-03 DIAGNOSIS — E03.9 HYPOTHYROIDISM, UNSPECIFIED: ICD-10-CM

## 2017-12-06 NOTE — PATIENT PROFILE ADULT. - CAREGIVER RELATION TO PATIENT
"Shy Olson's goals for this visit include: return  She requests these members of her care team be copied on today's visit information:     PCP: Kehr, Kristen M    Referring Provider:  No referring provider defined for this encounter.    Chief Complaint   Patient presents with     RECHECK       Initial /80  Pulse 101  Wt 101.2 kg (223 lb)  SpO2 97%  BMI 40.79 kg/m2 Estimated body mass index is 40.79 kg/(m^2) as calculated from the following:    Height as of 6/26/17: 1.575 m (5' 2\").    Weight as of this encounter: 101.2 kg (223 lb).  Medication Reconciliation: complete    Do you need any medication refills at today's visit? ?  " daughter

## 2017-12-19 ENCOUNTER — APPOINTMENT (OUTPATIENT)
Dept: HOME HEALTH SERVICES | Facility: HOME HEALTH | Age: 82
End: 2017-12-19

## 2017-12-19 ENCOUNTER — OTHER (OUTPATIENT)
Age: 82
End: 2017-12-19

## 2017-12-29 ENCOUNTER — APPOINTMENT (OUTPATIENT)
Dept: HOME HEALTH SERVICES | Facility: HOME HEALTH | Age: 82
End: 2017-12-29

## 2021-10-07 NOTE — ED ADULT TRIAGE NOTE - NS ED TRIAGE PATIENT LAST STATUS
Crystal called to see if we can send over a order for Covid infusion therapy the patient is scheduled on 10/8/21 at 1 o'clock     Please call to advise
I faxed this today, and gave them a verbal order.
17:30

## 2022-02-02 ENCOUNTER — INPATIENT (INPATIENT)
Facility: HOSPITAL | Age: 87
LOS: 7 days | Discharge: NURSING, REHAB OR LONG-TERM CARE | DRG: 64 | End: 2022-02-10
Attending: PSYCHIATRY & NEUROLOGY | Admitting: PSYCHIATRY & NEUROLOGY
Payer: COMMERCIAL

## 2022-02-02 VITALS — HEIGHT: 60 IN

## 2022-02-02 DIAGNOSIS — I63.9 CEREBRAL INFARCTION, UNSPECIFIED: ICD-10-CM

## 2022-02-02 DIAGNOSIS — U07.1 COVID-19: ICD-10-CM

## 2022-02-02 DIAGNOSIS — R09.02 HYPOXEMIA: ICD-10-CM

## 2022-02-02 DIAGNOSIS — I10 ESSENTIAL (PRIMARY) HYPERTENSION: ICD-10-CM

## 2022-02-02 DIAGNOSIS — Z90.49 ACQUIRED ABSENCE OF OTHER SPECIFIED PARTS OF DIGESTIVE TRACT: Chronic | ICD-10-CM

## 2022-02-02 LAB
ALBUMIN SERPL ELPH-MCNC: 4.1 G/DL — SIGNIFICANT CHANGE UP (ref 3.3–5)
ALBUMIN SERPL ELPH-MCNC: 4.2 G/DL — SIGNIFICANT CHANGE UP (ref 3.3–5)
ALP SERPL-CCNC: 205 U/L — HIGH (ref 40–120)
ALP SERPL-CCNC: 212 U/L — HIGH (ref 40–120)
ALT FLD-CCNC: 23 U/L — SIGNIFICANT CHANGE UP (ref 10–45)
ALT FLD-CCNC: 25 U/L — SIGNIFICANT CHANGE UP (ref 10–45)
ANION GAP SERPL CALC-SCNC: 13 MMOL/L — SIGNIFICANT CHANGE UP (ref 5–17)
ANION GAP SERPL CALC-SCNC: 13 MMOL/L — SIGNIFICANT CHANGE UP (ref 5–17)
APPEARANCE UR: CLEAR — SIGNIFICANT CHANGE UP
APTT BLD: 33.2 SEC — SIGNIFICANT CHANGE UP (ref 27.5–35.5)
AST SERPL-CCNC: 17 U/L — SIGNIFICANT CHANGE UP (ref 10–40)
AST SERPL-CCNC: 21 U/L — SIGNIFICANT CHANGE UP (ref 10–40)
BACTERIA # UR AUTO: NEGATIVE — SIGNIFICANT CHANGE UP
BASE EXCESS BLDV CALC-SCNC: 0.8 MMOL/L — SIGNIFICANT CHANGE UP (ref -2–2)
BASOPHILS # BLD AUTO: 0.08 K/UL — SIGNIFICANT CHANGE UP (ref 0–0.2)
BASOPHILS NFR BLD AUTO: 1 % — SIGNIFICANT CHANGE UP (ref 0–2)
BILIRUB SERPL-MCNC: 0.4 MG/DL — SIGNIFICANT CHANGE UP (ref 0.2–1.2)
BILIRUB SERPL-MCNC: 0.4 MG/DL — SIGNIFICANT CHANGE UP (ref 0.2–1.2)
BILIRUB UR-MCNC: NEGATIVE — SIGNIFICANT CHANGE UP
BUN SERPL-MCNC: 15 MG/DL — SIGNIFICANT CHANGE UP (ref 7–23)
BUN SERPL-MCNC: 15 MG/DL — SIGNIFICANT CHANGE UP (ref 7–23)
CA-I SERPL-SCNC: 1.24 MMOL/L — SIGNIFICANT CHANGE UP (ref 1.15–1.33)
CALCIUM SERPL-MCNC: 9.6 MG/DL — SIGNIFICANT CHANGE UP (ref 8.4–10.5)
CALCIUM SERPL-MCNC: 9.8 MG/DL — SIGNIFICANT CHANGE UP (ref 8.4–10.5)
CHLORIDE BLDV-SCNC: 105 MMOL/L — SIGNIFICANT CHANGE UP (ref 96–108)
CHLORIDE SERPL-SCNC: 104 MMOL/L — SIGNIFICANT CHANGE UP (ref 96–108)
CHLORIDE SERPL-SCNC: 98 MMOL/L — SIGNIFICANT CHANGE UP (ref 96–108)
CO2 BLDV-SCNC: 28 MMOL/L — HIGH (ref 22–26)
CO2 SERPL-SCNC: 22 MMOL/L — SIGNIFICANT CHANGE UP (ref 22–31)
CO2 SERPL-SCNC: 24 MMOL/L — SIGNIFICANT CHANGE UP (ref 22–31)
COLOR SPEC: COLORLESS — SIGNIFICANT CHANGE UP
COVID-19 NUCLEOCAPSID GAM AB INTERP: POSITIVE
COVID-19 NUCLEOCAPSID TOTAL GAM ANTIBODY RESULT: 139 INDEX — HIGH
COVID-19 SPIKE DOMAIN AB INTERP: POSITIVE
COVID-19 SPIKE DOMAIN ANTIBODY RESULT: >250 U/ML — HIGH
CREAT SERPL-MCNC: 1.01 MG/DL — SIGNIFICANT CHANGE UP (ref 0.5–1.3)
CREAT SERPL-MCNC: 1.16 MG/DL — SIGNIFICANT CHANGE UP (ref 0.5–1.3)
CRP SERPL-MCNC: 23 MG/L — HIGH (ref 0–4)
CULTURE RESULTS: NO GROWTH — SIGNIFICANT CHANGE UP
D DIMER BLD IA.RAPID-MCNC: 728 NG/ML DDU — HIGH
DIFF PNL FLD: NEGATIVE — SIGNIFICANT CHANGE UP
EOSINOPHIL # BLD AUTO: 0.15 K/UL — SIGNIFICANT CHANGE UP (ref 0–0.5)
EOSINOPHIL NFR BLD AUTO: 1.8 % — SIGNIFICANT CHANGE UP (ref 0–6)
EPI CELLS # UR: 0 /HPF — SIGNIFICANT CHANGE UP
ERYTHROCYTE [SEDIMENTATION RATE] IN BLOOD: 108 MM/HR — HIGH (ref 0–20)
FERRITIN SERPL-MCNC: 983 NG/ML — HIGH (ref 15–150)
FIBRINOGEN PPP-MCNC: 570 MG/DL — HIGH (ref 290–520)
GAS PNL BLDV: 139 MMOL/L — SIGNIFICANT CHANGE UP (ref 136–145)
GAS PNL BLDV: SIGNIFICANT CHANGE UP
GAS PNL BLDV: SIGNIFICANT CHANGE UP
GLUCOSE BLDC GLUCOMTR-MCNC: 217 MG/DL — HIGH (ref 70–99)
GLUCOSE BLDC GLUCOMTR-MCNC: 240 MG/DL — HIGH (ref 70–99)
GLUCOSE BLDC GLUCOMTR-MCNC: 318 MG/DL — HIGH (ref 70–99)
GLUCOSE BLDV-MCNC: 247 MG/DL — HIGH (ref 70–99)
GLUCOSE SERPL-MCNC: 230 MG/DL — HIGH (ref 70–99)
GLUCOSE SERPL-MCNC: 293 MG/DL — HIGH (ref 70–99)
GLUCOSE UR QL: NEGATIVE — SIGNIFICANT CHANGE UP
HCO3 BLDV-SCNC: 27 MMOL/L — SIGNIFICANT CHANGE UP (ref 22–29)
HCT VFR BLD CALC: 31.4 % — LOW (ref 34.5–45)
HCT VFR BLD CALC: 32 % — LOW (ref 34.5–45)
HCT VFR BLDA CALC: 31 % — LOW (ref 34.5–46.5)
HGB BLD CALC-MCNC: 10.4 G/DL — LOW (ref 11.7–16.1)
HGB BLD-MCNC: 10.1 G/DL — LOW (ref 11.5–15.5)
HGB BLD-MCNC: 9.9 G/DL — LOW (ref 11.5–15.5)
HOROWITZ INDEX BLDV+IHG-RTO: SIGNIFICANT CHANGE UP
IMM GRANULOCYTES NFR BLD AUTO: 0.5 % — SIGNIFICANT CHANGE UP (ref 0–1.5)
INR BLD: 1.16 RATIO — SIGNIFICANT CHANGE UP (ref 0.88–1.16)
KETONES UR-MCNC: NEGATIVE — SIGNIFICANT CHANGE UP
LACTATE BLDV-MCNC: 2.2 MMOL/L — HIGH (ref 0.7–2)
LEUKOCYTE ESTERASE UR-ACNC: NEGATIVE — SIGNIFICANT CHANGE UP
LYMPHOCYTES # BLD AUTO: 3.48 K/UL — HIGH (ref 1–3.3)
LYMPHOCYTES # BLD AUTO: 41.9 % — SIGNIFICANT CHANGE UP (ref 13–44)
MAGNESIUM SERPL-MCNC: 1.9 MG/DL — SIGNIFICANT CHANGE UP (ref 1.6–2.6)
MCHC RBC-ENTMCNC: 26 PG — LOW (ref 27–34)
MCHC RBC-ENTMCNC: 26.1 PG — LOW (ref 27–34)
MCHC RBC-ENTMCNC: 31.5 GM/DL — LOW (ref 32–36)
MCHC RBC-ENTMCNC: 31.6 GM/DL — LOW (ref 32–36)
MCV RBC AUTO: 82.4 FL — SIGNIFICANT CHANGE UP (ref 80–100)
MCV RBC AUTO: 82.7 FL — SIGNIFICANT CHANGE UP (ref 80–100)
MONOCYTES # BLD AUTO: 0.66 K/UL — SIGNIFICANT CHANGE UP (ref 0–0.9)
MONOCYTES NFR BLD AUTO: 7.9 % — SIGNIFICANT CHANGE UP (ref 2–14)
NEUTROPHILS # BLD AUTO: 3.9 K/UL — SIGNIFICANT CHANGE UP (ref 1.8–7.4)
NEUTROPHILS NFR BLD AUTO: 46.9 % — SIGNIFICANT CHANGE UP (ref 43–77)
NITRITE UR-MCNC: NEGATIVE — SIGNIFICANT CHANGE UP
NRBC # BLD: 0 /100 WBCS — SIGNIFICANT CHANGE UP (ref 0–0)
NRBC # BLD: 0 /100 WBCS — SIGNIFICANT CHANGE UP (ref 0–0)
OTHER CELLS CSF MANUAL: 13.5 ML/DL — LOW (ref 18–22)
PCO2 BLDV: 47 MMHG — HIGH (ref 39–42)
PH BLDV: 7.36 — SIGNIFICANT CHANGE UP (ref 7.32–7.43)
PH UR: 7.5 — SIGNIFICANT CHANGE UP (ref 5–8)
PHOSPHATE SERPL-MCNC: 3.8 MG/DL — SIGNIFICANT CHANGE UP (ref 2.5–4.5)
PLATELET # BLD AUTO: 364 K/UL — SIGNIFICANT CHANGE UP (ref 150–400)
PLATELET # BLD AUTO: 367 K/UL — SIGNIFICANT CHANGE UP (ref 150–400)
PO2 BLDV: 69 MMHG — HIGH (ref 25–45)
POTASSIUM BLDV-SCNC: 4.1 MMOL/L — SIGNIFICANT CHANGE UP (ref 3.5–5.1)
POTASSIUM SERPL-MCNC: 4.2 MMOL/L — SIGNIFICANT CHANGE UP (ref 3.5–5.3)
POTASSIUM SERPL-MCNC: 4.9 MMOL/L — SIGNIFICANT CHANGE UP (ref 3.5–5.3)
POTASSIUM SERPL-SCNC: 4.2 MMOL/L — SIGNIFICANT CHANGE UP (ref 3.5–5.3)
POTASSIUM SERPL-SCNC: 4.9 MMOL/L — SIGNIFICANT CHANGE UP (ref 3.5–5.3)
PROT SERPL-MCNC: 9.1 G/DL — HIGH (ref 6–8.3)
PROT SERPL-MCNC: 9.2 G/DL — HIGH (ref 6–8.3)
PROT UR-MCNC: ABNORMAL
PROTHROM AB SERPL-ACNC: 13.8 SEC — HIGH (ref 10.6–13.6)
RBC # BLD: 3.81 M/UL — SIGNIFICANT CHANGE UP (ref 3.8–5.2)
RBC # BLD: 3.87 M/UL — SIGNIFICANT CHANGE UP (ref 3.8–5.2)
RBC # FLD: 14.1 % — SIGNIFICANT CHANGE UP (ref 10.3–14.5)
RBC # FLD: 14.2 % — SIGNIFICANT CHANGE UP (ref 10.3–14.5)
RBC CASTS # UR COMP ASSIST: 1 /HPF — SIGNIFICANT CHANGE UP (ref 0–4)
SAO2 % BLDV: 94.3 % — HIGH (ref 67–88)
SARS-COV-2 IGG+IGM SERPL QL IA: 139 INDEX — HIGH
SARS-COV-2 IGG+IGM SERPL QL IA: >250 U/ML — HIGH
SARS-COV-2 IGG+IGM SERPL QL IA: POSITIVE
SARS-COV-2 IGG+IGM SERPL QL IA: POSITIVE
SARS-COV-2 RNA SPEC QL NAA+PROBE: DETECTED
SODIUM SERPL-SCNC: 135 MMOL/L — SIGNIFICANT CHANGE UP (ref 135–145)
SODIUM SERPL-SCNC: 139 MMOL/L — SIGNIFICANT CHANGE UP (ref 135–145)
SP GR SPEC: 1.01 — LOW (ref 1.01–1.02)
SPECIMEN SOURCE: SIGNIFICANT CHANGE UP
TROPONIN T, HIGH SENSITIVITY RESULT: 27 NG/L — SIGNIFICANT CHANGE UP (ref 0–51)
UROBILINOGEN FLD QL: NEGATIVE — SIGNIFICANT CHANGE UP
WBC # BLD: 10.7 K/UL — HIGH (ref 3.8–10.5)
WBC # BLD: 8.31 K/UL — SIGNIFICANT CHANGE UP (ref 3.8–10.5)
WBC # FLD AUTO: 10.7 K/UL — HIGH (ref 3.8–10.5)
WBC # FLD AUTO: 8.31 K/UL — SIGNIFICANT CHANGE UP (ref 3.8–10.5)
WBC UR QL: 0 /HPF — SIGNIFICANT CHANGE UP (ref 0–5)

## 2022-02-02 PROCEDURE — 70496 CT ANGIOGRAPHY HEAD: CPT | Mod: 26,MA,59

## 2022-02-02 PROCEDURE — 99291 CRITICAL CARE FIRST HOUR: CPT

## 2022-02-02 PROCEDURE — 95720 EEG PHY/QHP EA INCR W/VEEG: CPT

## 2022-02-02 PROCEDURE — 0042T: CPT

## 2022-02-02 PROCEDURE — 71045 X-RAY EXAM CHEST 1 VIEW: CPT | Mod: 26

## 2022-02-02 PROCEDURE — 70498 CT ANGIOGRAPHY NECK: CPT | Mod: 26,MA

## 2022-02-02 RX ORDER — SODIUM CHLORIDE 9 MG/ML
1000 INJECTION, SOLUTION INTRAVENOUS
Refills: 0 | Status: DISCONTINUED | OUTPATIENT
Start: 2022-02-02 | End: 2022-02-10

## 2022-02-02 RX ORDER — BRIMONIDINE TARTRATE 2 MG/MG
1 SOLUTION/ DROPS OPHTHALMIC
Refills: 0 | Status: DISCONTINUED | OUTPATIENT
Start: 2022-02-02 | End: 2022-02-10

## 2022-02-02 RX ORDER — INSULIN LISPRO 100/ML
VIAL (ML) SUBCUTANEOUS EVERY 6 HOURS
Refills: 0 | Status: DISCONTINUED | OUTPATIENT
Start: 2022-02-02 | End: 2022-02-02

## 2022-02-02 RX ORDER — LEVOTHYROXINE SODIUM 125 MCG
88 TABLET ORAL DAILY
Refills: 0 | Status: DISCONTINUED | OUTPATIENT
Start: 2022-02-02 | End: 2022-02-10

## 2022-02-02 RX ORDER — DEXTROSE 50 % IN WATER 50 %
15 SYRINGE (ML) INTRAVENOUS ONCE
Refills: 0 | Status: DISCONTINUED | OUTPATIENT
Start: 2022-02-02 | End: 2022-02-10

## 2022-02-02 RX ORDER — GLUCAGON INJECTION, SOLUTION 0.5 MG/.1ML
1 INJECTION, SOLUTION SUBCUTANEOUS ONCE
Refills: 0 | Status: DISCONTINUED | OUTPATIENT
Start: 2022-02-02 | End: 2022-02-10

## 2022-02-02 RX ORDER — DEXTROSE 50 % IN WATER 50 %
25 SYRINGE (ML) INTRAVENOUS ONCE
Refills: 0 | Status: DISCONTINUED | OUTPATIENT
Start: 2022-02-02 | End: 2022-02-10

## 2022-02-02 RX ORDER — DEXAMETHASONE 0.5 MG/5ML
6 ELIXIR ORAL ONCE
Refills: 0 | Status: COMPLETED | OUTPATIENT
Start: 2022-02-02 | End: 2022-02-02

## 2022-02-02 RX ORDER — DEXTROSE 50 % IN WATER 50 %
12.5 SYRINGE (ML) INTRAVENOUS ONCE
Refills: 0 | Status: DISCONTINUED | OUTPATIENT
Start: 2022-02-02 | End: 2022-02-10

## 2022-02-02 RX ORDER — HEPARIN SODIUM 5000 [USP'U]/ML
5000 INJECTION INTRAVENOUS; SUBCUTANEOUS EVERY 12 HOURS
Refills: 0 | Status: DISCONTINUED | OUTPATIENT
Start: 2022-02-02 | End: 2022-02-10

## 2022-02-02 RX ORDER — INSULIN LISPRO 100/ML
VIAL (ML) SUBCUTANEOUS
Refills: 0 | Status: DISCONTINUED | OUTPATIENT
Start: 2022-02-02 | End: 2022-02-10

## 2022-02-02 RX ORDER — LATANOPROST 0.05 MG/ML
1 SOLUTION/ DROPS OPHTHALMIC; TOPICAL AT BEDTIME
Refills: 0 | Status: DISCONTINUED | OUTPATIENT
Start: 2022-02-02 | End: 2022-02-10

## 2022-02-02 RX ORDER — SODIUM CHLORIDE 9 MG/ML
1000 INJECTION, SOLUTION INTRAVENOUS
Refills: 0 | Status: DISCONTINUED | OUTPATIENT
Start: 2022-02-02 | End: 2022-02-09

## 2022-02-02 RX ADMIN — Medication 2: at 22:51

## 2022-02-02 RX ADMIN — LATANOPROST 1 DROP(S): 0.05 SOLUTION/ DROPS OPHTHALMIC; TOPICAL at 22:29

## 2022-02-02 RX ADMIN — BRIMONIDINE TARTRATE 1 DROP(S): 2 SOLUTION/ DROPS OPHTHALMIC at 19:10

## 2022-02-02 RX ADMIN — HEPARIN SODIUM 5000 UNIT(S): 5000 INJECTION INTRAVENOUS; SUBCUTANEOUS at 17:59

## 2022-02-02 RX ADMIN — SODIUM CHLORIDE 75 MILLILITER(S): 9 INJECTION, SOLUTION INTRAVENOUS at 22:29

## 2022-02-02 RX ADMIN — SODIUM CHLORIDE 75 MILLILITER(S): 9 INJECTION, SOLUTION INTRAVENOUS at 11:05

## 2022-02-02 RX ADMIN — Medication 4: at 12:58

## 2022-02-02 RX ADMIN — Medication 2: at 17:56

## 2022-02-02 RX ADMIN — Medication 6 MILLIGRAM(S): at 02:52

## 2022-02-02 NOTE — PHYSICAL THERAPY INITIAL EVALUATION ADULT - ADDITIONAL COMMENTS
Pt unable to give past level of functioning/past living situation on whether pt needs assistance with mobility. Pt unable to give past level of functioning/past living situation on whether pt needs assistance with mobility. as per EMR:  At baseline, able to feed herself, use bathroom herself. Ambulates with walker. Needs assistance with other ADLs.

## 2022-02-02 NOTE — SPEECH LANGUAGE PATHOLOGY EVALUATION - SLP AUTOMATIC SPEECH
Impaired for counting (stated 1,2, 3, 4, 3, 7, 8--suspect attention deficit v. true language deficit/intact/days of the week

## 2022-02-02 NOTE — CONSULT NOTE ADULT - SUBJECTIVE AND OBJECTIVE BOX
Neurology Consultation  Miguelito Monaco, PGY-2      HPI: Patient is a 89yo RH F PMHx HTN, HLD, ?TIA not on ac/ap agents, who presents to ED for change in mental status and slumping over. Patient last seen by accompanying family member at 11 AM 22 with mild confusion. Patient went to sleep and woke up in evening of 22 asking for help. Was found slumped over, slurred speech and L facial droop. EMS called for concern of stroke. At baseline, able to feed herself, use bathroom herself. Ambulates with walker. Needs assistance with other ADLs.     Patient unable to provide ROS.    (Stroke only)  NIHSS: 16  preMRS: 3   ICH: 2      PAST MEDICAL & SURGICAL HISTORY:  HTN (hypertension)    HLD (hyperlipidemia)    DM (diabetes mellitus)    Hypothyroidism    TIA (transient ischemic attack)    S/P cholecystectomy    FAMILY HISTORY:  Family history unknown    SOCIAL HISTORY: no smoking, alcohol, drug use hx    MEDICATIONS (HOME):  Home Medications:  Apidra SoloStar Pen 100 units/mL subcutaneous solution: 14u before Lunch, 16u before Dinner  (2015 09:13)  Cymbalta 60 mg oral delayed release capsule: 1 cap(s) orally once a day (2015 13:33)  enalapril 10 mg oral tablet: 1 tab(s) orally once a day (2015 09:12)  insulin glargine 100 units/mL subcutaneous solution: 40 unit(s) subcutaneous once a day (at bedtime) (2015 17:02)  isosorbide mononitrate 60 mg oral tablet, extended release: 1 tab(s) orally once a day (in the morning) (2015 13:32)  levothyroxine: 88 microgram(s) orally once a day (19 Aug 2014 13:42)  simvastatin: 40 milligram(s) orally once a day (19 Aug 2014 13:42)    MEDICATIONS  (STANDING):    MEDICATIONS  (PRN):    ALLERGIES/INTOLERANCES:  Allergies  No Known Allergies    Intolerances    Vital Signs Last 24 Hrs  T(C): --  T(F): --  HR: --  BP: --  BP(mean): --  RR: --  SpO2: --     General:  Constitutional: Female, appears stated age,lethargic, requires loud verbal stimuli or severe noxious stimuli to open eyes and briefly follow simple commands  Head: Normocephalic; Eyes: clear sclera; Neck: no rigidity   Extremities: No cyanosis; Skin: No brigitte edema of LE  Resp: breathing comfortably on nasal cannula  Spitting up in CT scan. Mild emesis    Neurological (>12):  MS: Awake, intermittently alert, cannot assess orientation. Only followed 2-3 commands and then stopped.    Language: Speech is minimal to mute, hypophonic, fragmented.      CNs: pupils equal round and minimally reactive to light bilaterally (?cataract surg hx) (R = 3mm, L = 3mm). Not blinking to BTT of both eyes. L gaze preference but with effort can cross midline. Mild R gaze palsy. L facial droop.     Motor: N noticeable tremor or seizure.               Deltoid	Biceps	Triceps	   R	5	5	5	5		 	  L	5	5	5	5			  	H-Flex	K-Ext	D-Flex	P-Flex  R	5	5	5	5			 	   L	5	5	5	5		     Sensation: Possibly reducedon on DSS (neglect): none  Reflexes:              Biceps(C5)       BR(C6)     Triceps(C7)               Patellar(L4)        Plantar Resp  R	1	          1	             1		        0		    	Down   L	2	          1	             1		        0		 	unclear    Coordination: not following for assessment  Gait: medically unstable to assess    LABORATORY:  CBC                       10.1   8.31  )-----------( 367      ( 2022 00:37 )             32.0     Chem 02-    139  |  104  |  15  ----------------------------<  230<H>  4.2   |  22  |  1.16    Ca    9.6      2022 00:37    TPro  9.1<H>  /  Alb  4.1  /  TBili  0.4  /  DBili  x   /  AST  21  /  ALT  25  /  AlkPhos  212<H>  02-02    LFTs LIVER FUNCTIONS - ( 2022 00:37 )  Alb: 4.1 g/dL / Pro: 9.1 g/dL / ALK PHOS: 212 U/L / ALT: 25 U/L / AST: 21 U/L / GGT: x           Coagulopathy   Lipid Panel   A1c   Cardiac enzymes CARDIAC MARKERS ( 2022 00:37 )  x     / x     / 65 U/L / x     / x          U/A Urinalysis Basic - ( 2022 01:00 )    Color: Colorless / Appearance: Clear / S.008 / pH: x  Gluc: x / Ketone: Negative  / Bili: Negative / Urobili: Negative   Blood: x / Protein: Trace / Nitrite: Negative   Leuk Esterase: Negative / RBC: 1 /hpf / WBC 0 /HPF   Sq Epi: x / Non Sq Epi: 0 /hpf / Bacteria: Negative      CSF  Other    STUDIES & IMAGING: (EEG, CT, MR, U/S, TTE/WILNER):      ACC: [id number] EXAM:  CT BRAIN STROKE PROTOCOL                          PROCEDURE DATE:  2022      INTERPRETATION:  EXAMINATION: CT HEAD STROKE PROTOCOL    DATE: 2022 12:42 AM    INDICATION: Code stroke, altered mental status    COMPARISON: CT head 10/31/2017     FINDINGS:    Gyriform hyperdensity in the right parietal and occipital lobes with   surrounding cytotoxic edema. No hydrocephalus. No central herniation.   Basilar cisterns are clear. Scattered moderate chronic microvascular   ischemic change.    The scalp and imaged midfacial soft tissues are unremarkable. Calvarium   is intact. Scattered paranasal sinus mucosal thickening. Mastoid air   cells are clear.      IMPRESSION:    1. Hemorrhagic infarct in the right PCA territory.    Findings were discussed with Dr. Monaco on 2022 at 12:46 AM.    --- Endof Report ---      Neurology Consultation  Miguelito Monaco, PGY-2      HPI: Patient is a 89yo RH F PMHx HTN, HLD, ?TIA not on ac/ap agents, who presents to ED for change in mental status and slumping over. Patient last seen by accompanying family member at 11 AM 22 with mild confusion. Patient went to sleep and woke up in evening of 22 asking for help. Was found slumped over, slurred speech and L facial droop. EMS called for concern of stroke. At baseline, able to feed herself, use bathroom herself. Ambulates with walker. Needs assistance with other ADLs.     Patient unable to provide ROS.    (Stroke only)  NIHSS: 16  preMRS: 3   ICH: 2      PAST MEDICAL & SURGICAL HISTORY:  HTN (hypertension)    HLD (hyperlipidemia)    DM (diabetes mellitus)    Hypothyroidism    TIA (transient ischemic attack)    S/P cholecystectomy    FAMILY HISTORY:  Family history unknown    SOCIAL HISTORY: no smoking, alcohol, drug use hx    MEDICATIONS (HOME):  Home Medications:  Apidra SoloStar Pen 100 units/mL subcutaneous solution: 14u before Lunch, 16u before Dinner  (2015 09:13)  Cymbalta 60 mg oral delayed release capsule: 1 cap(s) orally once a day (2015 13:33)  enalapril 10 mg oral tablet: 1 tab(s) orally once a day (2015 09:12)  insulin glargine 100 units/mL subcutaneous solution: 40 unit(s) subcutaneous once a day (at bedtime) (2015 17:02)  isosorbide mononitrate 60 mg oral tablet, extended release: 1 tab(s) orally once a day (in the morning) (2015 13:32)  levothyroxine: 88 microgram(s) orally once a day (19 Aug 2014 13:42)  simvastatin: 40 milligram(s) orally once a day (19 Aug 2014 13:42)    MEDICATIONS  (STANDING):    MEDICATIONS  (PRN):    ALLERGIES/INTOLERANCES:  Allergies  No Known Allergies    Intolerances    Vital Signs Last 24 Hrs  T(C): --  T(F): --  HR: --  BP: --  BP(mean): --  RR: --  SpO2: --     General:  Constitutional: Female, appears stated age,lethargic, requires loud verbal stimuli or severe noxious stimuli to open eyes and briefly follow simple commands  Head: Normocephalic; Eyes: clear sclera; Neck: no rigidity   Extremities: No cyanosis; Skin: No brigitte edema of LE  Resp: breathing comfortably on nasal cannula  Spitting up in CT scan. Mild emesis    Neurological (>12):  MS: Awake, intermittently alert, cannot assess orientation. Only followed 2-3 commands and then stopped.    Language: Speech is minimal to mute, hypophonic, fragmented.      CNs: pupils equal round and minimally reactive to light bilaterally (?cataract surg hx) (R = 3mm, L = 3mm). Not blinking to BTT of both eyes. L gaze preference but with effort can cross midline. Mild R gaze palsy. L facial droop.     Motor: N noticeable tremor or seizure. Limited as not following commands              Deltoid	Biceps	Triceps	   R	5	-	-	4+		 	  L	4+	5	-	4	keeps flexed in elbow and wrist		  	H-Flex	K-Ext	D-Flex	P-Flex  R	4+	-	-	-			 	   L	3	-	-	-		     Sensation: Possibly reduced on L side given greater noxious stimuli needed to elicit facial grimmacing on that side. Patient neglects using L side until prompted several times.   Reflexes:              Biceps(C5)       BR(C6)     Triceps(C7)               Patellar(L4)        Plantar Resp  R	1	          1	             1		        0		    	Down   L	2	          1	             1		        0		 	unclear    Coordination: not following for assessment  Gait: medically unstable to assess    LABORATORY:  CBC                       10.1   8.31  )-----------( 367      ( 2022 00:37 )             32.0     Chem -    139  |  104  |  15  ----------------------------<  230<H>  4.2   |  22  |  1.16    Ca    9.6      2022 00:37    TPro  9.1<H>  /  Alb  4.1  /  TBili  0.4  /  DBili  x   /  AST  21  /  ALT  25  /  AlkPhos  212<H>  02-02    LFTs LIVER FUNCTIONS - ( 2022 00:37 )  Alb: 4.1 g/dL / Pro: 9.1 g/dL / ALK PHOS: 212 U/L / ALT: 25 U/L / AST: 21 U/L / GGT: x           Coagulopathy   Lipid Panel   A1c   Cardiac enzymes CARDIAC MARKERS ( 2022 00:37 )  x     / x     / 65 U/L / x     / x          U/A Urinalysis Basic - ( 2022 01:00 )    Color: Colorless / Appearance: Clear / S.008 / pH: x  Gluc: x / Ketone: Negative  / Bili: Negative / Urobili: Negative   Blood: x / Protein: Trace / Nitrite: Negative   Leuk Esterase: Negative / RBC: 1 /hpf / WBC 0 /HPF   Sq Epi: x / Non Sq Epi: 0 /hpf / Bacteria: Negative      CSF  Other    STUDIES & IMAGING: (EEG, CT, MR, U/S, TTE/WILNER):      ACC: [id number] EXAM:  CT BRAIN STROKE PROTOCOL                          PROCEDURE DATE:  2022      INTERPRETATION:  EXAMINATION: CT HEAD STROKE PROTOCOL    DATE: 2022 12:42 AM    INDICATION: Code stroke, altered mental status    COMPARISON: CT head 10/31/2017     FINDINGS:    Gyriform hyperdensity in the right parietal and occipital lobes with   surrounding cytotoxic edema. No hydrocephalus. No central herniation.   Basilar cisterns are clear. Scattered moderate chronic microvascular   ischemic change.    The scalp and imaged midfacial soft tissues are unremarkable. Calvarium   is intact. Scattered paranasal sinus mucosal thickening. Mastoid air   cells are clear.      IMPRESSION:    1. Hemorrhagic infarct in the right PCA territory.    Findings were discussed with Dr. Monaco on 2022 at 12:46 AM.    --- Endof Report ---

## 2022-02-02 NOTE — ED ADULT NURSE NOTE - OBJECTIVE STATEMENT
89 y.o. F A&Ox2 PMHx of HLD, HTN, DM presenting to ED for sudden slurred speech and L sided facial droop. As per granddaughter, pt was asleep and called for help. When family arrived she was slumped over. Upon assessment, pt occasionally follows demands, but is weak in all four extremities. Left facial droop noted. Pupils are PERRL, pt experiencing emesis, light green in color. 20 G PIV placed in RAC. Pt directly to CT scan, noncontrast obtained, but pt hypoxic to 92% and contrast scan halted.

## 2022-02-02 NOTE — ED ADULT NURSE NOTE - SUICIDE SCREENING QUESTION 3
How Severe Is Your Acne?: moderate Is This A New Presentation, Or A Follow-Up?: Acne What Type Of Note Output Would You Prefer (Optional)?: Bullet Format Additional Comments (Use Complete Sentences): Patient has tried [facility] in the past. She currently washes face BID with CeraVe cleanser. She moisturizes face with CeraVe moisturizer. \\n\\nShe notes that acne is mostly on lower face. She is not currently pregnant, trying to get pregnant, or breastfeeding. She denies history of blood pressure or kidney conditions. \\n\\nToday is an average day for her skin. \\n Patient unable to complete

## 2022-02-02 NOTE — CONSULT NOTE ADULT - SUBJECTIVE AND OBJECTIVE BOX
Patient is a 89yo RH F PMHx HTN, HLD, ?TIA not on ac/ap agents, who presents to ED for change in mental status, slumping over, L facial droop, and possible L hemiparesis. Patient last seen by accompanying family member at 11 AM 22 with mild confusion. Patient went to sleep and woke up in evening of 22 asking for help. Was found slumped over, slurred speech and L facial droop. EMS called for concern of stroke. At baseline, able to feed herself, use bathroom herself. Ambulates with walker. Needs assistance with other ADLs.     Patient unable to provide ROS.    (Stroke only)  NIHSS: 16  preMRS: 3   ICH: 2   (2022 05:45)    22 @ 14:37  PAST MEDICAL & SURGICAL HISTORY:  HTN (hypertension)    HLD (hyperlipidemia)    DM (diabetes mellitus)    Hypothyroidism    TIA (transient ischemic attack)    S/P cholecystectomy        Review of Systems:   UTO    Allergies    No Known Allergies    Intolerances        Social History:     FAMILY HISTORY:      MEDICATIONS  (STANDING):  brimonidine 0.2% Ophthalmic Solution 1 Drop(s) Both EYES two times a day  dextrose 40% Gel 15 Gram(s) Oral once  dextrose 5%. 1000 milliLiter(s) (50 mL/Hr) IV Continuous <Continuous>  dextrose 5%. 1000 milliLiter(s) (100 mL/Hr) IV Continuous <Continuous>  dextrose 50% Injectable 25 Gram(s) IV Push once  dextrose 50% Injectable 12.5 Gram(s) IV Push once  dextrose 50% Injectable 25 Gram(s) IV Push once  glucagon  Injectable 1 milliGRAM(s) IntraMuscular once  heparin   Injectable 5000 Unit(s) SubCutaneous every 12 hours  insulin lispro (ADMELOG) corrective regimen sliding scale   SubCutaneous Before meals and at bedtime  lactated ringers. 1000 milliLiter(s) (75 mL/Hr) IV Continuous <Continuous>  latanoprost 0.005% Ophthalmic Solution 1 Drop(s) Both EYES at bedtime  levothyroxine 88 MICROGram(s) Oral daily    MEDICATIONS  (PRN):      Vital Signs Last 24 Hrs  T(C): 36.6 (2022 12:00), Max: 36.6 (2022 07:46)  T(F): 97.8 (2022 12:00), Max: 97.8 (2022 07:46)  HR: 81 (2022 12:00) (65 - 86)  BP: 134/76 (2022 12:00) (134/76 - 168/65)  BP(mean): 100 (2022 12:00) (85 - 104)  RR: 15 (2022 12:00) (12 - 18)  SpO2: 100% (2022 12:00) (95% - 100%)  CAPILLARY BLOOD GLUCOSE      POCT Blood Glucose.: 318 mg/dL (2022 12:49)  POCT Blood Glucose.: 217 mg/dL (2022 00:09)    I&O's Summary    2022 07:01  -  2022 14:37  --------------------------------------------------------  IN: 525 mL / OUT: 200 mL / NET: 325 mL        PHYSICAL EXAM:  GENERAL: NAD, well-developed  HEAD:  Atraumatic, Normocephalic  EYES: EOMI, PERRLA, conjunctiva and sclera clear  NECK: Supple, No JVD  CHEST/LUNG: Clear to auscultation bilaterally; No wheeze  HEART: Regular rate and rhythm; No murmurs, rubs, or gallops  ABDOMEN: Soft, Nontender, Nondistended; Bowel sounds present  EXTREMITIES:  2+ Peripheral Pulses, No clubbing, cyanosis, or edema  Neurological (>12):  MS: Awake, intermittently alert, cannot assess orientation. Only followed 2-3 commands and then stopped.    Language: Speech is minimal to mute, hypophonic, fragmented.      CNs: pupils equal round and minimally reactive to light bilaterally (?cataract surg hx) (R = 3mm, L = 3mm). Not blinking to BTT of both eyes. L gaze preference but with effort can cross midline. Mild R gaze palsy. L facial droop.     Motor: N noticeable tremor or seizure. Limited as not following commands              Deltoid	Biceps	Triceps	   R	5	-	-	4+		 	  L	4+	5	-	4	keeps flexed in elbow and wrist		  	H-Flex	K-Ext	D-Flex	P-Flex  R	4+	-	-	-			 	   L	3	-	-	-		     Sensation: Possibly reduced on L side given greater noxious stimuli needed to elicit facial grimmacing on that side. Patient neglects using L side until prompted several times.   Reflexes:              Biceps(C5)       BR(C6)     Triceps(C7)               Patellar(L4)        Plantar Resp  R	1	          1	             1		        0		    	Down   L	2	          1	             1		        0		 	unclear    Coordination: not following for assessment  Gait: medically unstable to assess    LABS:                        9.9    10.70 )-----------( 364      ( 2022 10:52 )             31.4     02-    135  |  98  |  15  ----------------------------<  293<H>  4.9   |  24  |  1.01    Ca    9.8      2022 10:54  Phos  3.8     02-  Mg     1.9     -    TPro  9.2<H>  /  Alb  4.2  /  TBili  0.4  /  DBili  x   /  AST  17  /  ALT  23  /  AlkPhos  205<H>  02-02    PT/INR - ( 2022 10:54 )   PT: 13.8 sec;   INR: 1.16 ratio         PTT - ( 2022 10:54 )  PTT:33.2 sec  CARDIAC MARKERS ( 2022 00:37 )  x     / x     / 65 U/L / x     / x          Urinalysis Basic - ( 2022 01:00 )    Color: Colorless / Appearance: Clear / S.008 / pH: x  Gluc: x / Ketone: Negative  / Bili: Negative / Urobili: Negative   Blood: x / Protein: Trace / Nitrite: Negative   Leuk Esterase: Negative / RBC: 1 /hpf / WBC 0 /HPF   Sq Epi: x / Non Sq Epi: 0 /hpf / Bacteria: Negative        RADIOLOGY & ADDITIONAL TESTS:    Imaging Personally Reviewed:    Consultant(s) Notes Reviewed:      Care Discussed with Consultants/Other Providers:

## 2022-02-02 NOTE — OCCUPATIONAL THERAPY INITIAL EVALUATION ADULT - ADDITIONAL COMMENTS
CT head 2/3-Better definition of right superior cerebellar infarct which appears recent when compared with the prior 2/2/2022. Evolution of what appears to be a hemorrhagic right parietal territory recent infarct with mass effect on the atria and posterior body of the right lateral ventricle. Study is limited by patient positioning with portion of the left brain not visualized  CT HEAD 2/2:1. Progression of cytotoxic edema and petechial hemorrhage in the right parieto-occipital infarct. 2. Age-indeterminate ischemia/infarct in the superior right cerebellum. CTA BRAIN: 1. Patent intracranial circulation. No large vessel occlusion or significant stenosis..CTA NECK:1. Patent cervical vasculature. Possible severe stenosis at the origin of  the right vertebral artery versus artifact from regional motion.

## 2022-02-02 NOTE — CONSULT NOTE ADULT - SUBJECTIVE AND OBJECTIVE BOX
CHIEF COMPLAINT:  AMS    HISTORY OF PRESENT ILLNESS: Patient is a 89yo F PMHx HTN, HLD, ?TIA not on ac/ap agents, who presents to ED for change in mental status, slumping over, L facial droop, and possible L hemiparesis. Patient last seen by accompanying family member at 11 AM 2/1/22 with mild confusion. Patient went to sleep and woke up in evening of 2/1/22 asking for help. Was found slumped over, slurred speech and L facial droop. EMS called for concern of stroke. At baseline, able to feed herself, use bathroom herself. Ambulates with walker. Needs assistance with other ADLs.     PAST MEDICAL & SURGICAL HISTORY:  HTN (hypertension)    HLD (hyperlipidemia)    DM (diabetes mellitus)    Hypothyroidism    TIA (transient ischemic attack)    S/P cholecystectomy    MEDICATIONS:  heparin   Injectable 5000 Unit(s) SubCutaneous every 12 hours  dextrose 40% Gel 15 Gram(s) Oral once  dextrose 50% Injectable 25 Gram(s) IV Push once  dextrose 50% Injectable 12.5 Gram(s) IV Push once  dextrose 50% Injectable 25 Gram(s) IV Push once  glucagon  Injectable 1 milliGRAM(s) IntraMuscular once  insulin lispro (ADMELOG) corrective regimen sliding scale   SubCutaneous Before meals and at bedtime  levothyroxine 88 MICROGram(s) Oral daily    brimonidine 0.2% Ophthalmic Solution 1 Drop(s) Both EYES two times a day  dextrose 5%. 1000 milliLiter(s) IV Continuous <Continuous>  dextrose 5%. 1000 milliLiter(s) IV Continuous <Continuous>  lactated ringers. 1000 milliLiter(s) IV Continuous <Continuous>  latanoprost 0.005% Ophthalmic Solution 1 Drop(s) Both EYES at bedtime    FAMILY HISTORY:    SOCIAL HISTORY:    [ ] Non-smoker  [ ] Smoker  [ ] Alcohol    Allergies    No Known Allergies    Intolerances    REVIEW OF SYSTEMS:  Unable to obtain.     [ ] All others negative	  [ ] Unable to obtain    PHYSICAL EXAM:  T(C): 36.6 (02-02-22 @ 12:00), Max: 36.6 (02-02-22 @ 07:46)  HR: 82 (02-02-22 @ 14:00) (65 - 86)  BP: 118/56 (02-02-22 @ 14:00) (118/56 - 168/65)  RR: 16 (02-02-22 @ 14:00) (12 - 18)  SpO2: 100% (02-02-22 @ 14:00) (95% - 100%)  Wt(kg): --  I&O's Summary    02 Feb 2022 07:01  -  02 Feb 2022 15:44  --------------------------------------------------------  IN: 525 mL / OUT: 200 mL / NET: 325 mL    Appearance: NAD  HEENT: dry oral mucosa, PERRL, EOMI	  Lymphatic: No lymphadenopathy  Cardiovascular: Normal S1 S2, No JVD, No murmurs, No edema  Respiratory: Lungs clear to auscultation  Psychiatry: A & O x 0  Gastrointestinal:  Soft, Non-tender, + BS	  Skin: No rashes, No ecchymoses, No cyanosis	  Neurologic: Non-focal  MS: Awake, intermittently alert, cannot assess orientation. Did not follow commands.    Language: Speech was mute.   Extremities: decreased range of motion, No clubbing, cyanosis or edema  Vascular: Peripheral pulses palpable 2+ bilaterally    TELEMETRY: 	    ECG:   	  RADIOLOGY:  < from: CT Head No Cont (02.02.22 @ 03:59) >  ACC: [id number] EXAM:  CT ANGIO NECK (W)[facility]                        ACC: [id number] EXAM:  CT BRAIN                        ACC: [id number] EXAM:  CT ANGIO BRAIN (W)[facility]                          PROCEDURE DATE:  02/02/2022      INTERPRETATION:  HISTORY: Code stroke, hemorrhage follow-up    COMPARISON: CT from earlier the same date    TECHNIQUE: Noncontrast CT head and CT angiogram of the neck and brain was   performed after administration of intravenous contrast. MIP and 3D   reconstructions were performed.    Total of 70 cc Omnipaque 300 intravenous contrast were administered   without complication.    FINDINGS:    CT HEAD:    Progression of petechial hemorrhage and cytotoxic edema in the right   parieto-occipital infarct. Irregular hypodensity in the superior right   cerebellum, age indeterminate. No hydrocephalus. No central herniation.   Basal cisterns are clear.    CTA BRAIN    INTERNAL CAROTID ARTERIES:  The intracranial segments of the ICA are   patent without hemodynamically significant stenosis, occlusion, or   aneurysm. The ICA bifurcations are unremarkable.    ANTERIOR CEREBRAL ARTERIES: No flow-limiting stenosis or occlusion.   Anterior communicating artery is unremarkable without aneurysm.    MIDDLE CEREBRAL ARTERIES: No flow-limiting stenosis or occlusion. MCA   bifurcations are unremarkable without aneurysm.    POSTERIOR CEREBRAL ARTERIES: No flow-limiting stenosis or occlusion.   Fetal origin of the left PCA. Well-developed right posterior   communicating artery..    VERTEBROBASILAR SYSTEM: No flow-limiting stenosis or occlusion. Basilar   tip is unremarkable.    CTA NECK    RIGHT CAROTID SYSTEM: Normal in course and caliber without flow-limiting   stenosis or occlusion.    LEFT CAROTID SYSTEM: Normal in course and caliber without flow-limiting   stenosis or occlusion.    VERTEBRAL SYSTEM:  Normal in course and caliber  without flow-limiting   stenosis or occlusion. Origin of the right vertebral artery appears   markedly stenotic, however evaluation is degraded by regional motion   artifact. The right superior cerebellar artery is patent but attenuated   compared to the left.    AORTIC ARCH: Bovine arch branching variant. Origin of the great arteries   is patent.    Miscellaneous: Mediastinal adenopathy.    IMPRESSION:    CT HEAD:  1. Progression of cytotoxic edema and petechial hemorrhage in the right   parieto-occipital infarct.  2. Age-indeterminate ischemia/infarct in the superior right cerebellum.    CTA BRAIN:  1. Patent intracranial circulation. No large vessel occlusion or   significant stenosis..    CTA NECK:  1. Patent cervical vasculature. Possible severe stenosis at the origin of   the right vertebral artery versus artifact from regional motion.    Discussed with Dr. Monaco on 2/2/2022 at 4:06 AM.    --- End of Report ---  ALEX LEUNG MD; Resident Radiologist  This document has been electronically signed.  EN RUSSO MD; Attending Radiologist  This document has been electronically signed. Feb 2 2022  4:55AM    < end of copied text >  < from: CT Angio Neck w/ IV Cont (02.02.22 @ 03:59) >  ACC: [id number] EXAM:  CT ANGIO NECK (W)[facility]                        ACC: [id number] EXAM:  CT BRAIN                        ACC: [id number] EXAM:  CT ANGIO BRAIN (W)[facility]                          PROCEDURE DATE:  02/02/2022      INTERPRETATION:  HISTORY: Code stroke, hemorrhage follow-up    COMPARISON: CT from earlier the same date    TECHNIQUE: Noncontrast CT head and CT angiogram of the neck and brain was   performed after administration of intravenous contrast. MIP and 3D   reconstructions were performed.    Total of 70 cc Omnipaque 300 intravenous contrast were administered   without complication.    FINDINGS:    CT HEAD:    Progression of petechial hemorrhage and cytotoxic edema in the right   parieto-occipital infarct. Irregular hypodensity in the superior right   cerebellum, age indeterminate. No hydrocephalus. No central herniation.   Basal cisterns are clear.      CTA BRAIN    INTERNAL CAROTID ARTERIES:  The intracranial segments of the ICA are   patent without hemodynamically significant stenosis, occlusion, or   aneurysm. The ICA bifurcations are unremarkable.    ANTERIOR CEREBRAL ARTERIES: No flow-limiting stenosis or occlusion.   Anterior communicating artery is unremarkable without aneurysm.    MIDDLE CEREBRAL ARTERIES: No flow-limiting stenosis or occlusion. MCA   bifurcations are unremarkable without aneurysm.    POSTERIOR CEREBRAL ARTERIES: No flow-limiting stenosis or occlusion.   Fetal origin of the left PCA. Well-developed right posterior   communicating artery..    VERTEBROBASILAR SYSTEM: No flow-limiting stenosis or occlusion. Basilar   tip is unremarkable.    CTA NECK    RIGHT CAROTID SYSTEM: Normal in course and caliber without flow-limiting   stenosis or occlusion.    LEFT CAROTID SYSTEM: Normal in course and caliber without flow-limiting   stenosis or occlusion.    VERTEBRAL SYSTEM:  Normal in course and caliber  without flow-limiting   stenosis or occlusion. Origin of the right vertebral artery appears   markedly stenotic, however evaluation is degraded by regional motion   artifact. The right superior cerebellar artery is patent but attenuated   compared to the left.    AORTIC ARCH: Bovine arch branching variant. Origin of the great arteries   is patent.    Miscellaneous: Mediastinal adenopathy.    IMPRESSION:    CT HEAD:  1. Progression of cytotoxic edema and petechial hemorrhage in the right   parieto-occipital infarct.  2. Age-indeterminate ischemia/infarct in the superior right cerebellum.    CTA BRAIN:  1. Patent intracranial circulation. No large vessel occlusion or   significant stenosis..    CTA NECK:  1. Patent cervical vasculature. Possible severe stenosis at the origin of   the right vertebral artery versus artifact from regional motion.    Discussed with Dr. Monaco on 2/2/2022 at 4:06 AM.    --- End of Report ---  ALEX LEUNG MD; Resident Radiologist  This document has been electronically signed.  EN RUSSO MD; Attending Radiologist  This document has been electronically signed. Feb 2 2022  4:55AM    < end of copied text >  < from: CT Angio Head w/ IV Cont (02.02.22 @ 03:53) >  ACC: [id number] EXAM:  CT ANGIO NECK (W)[facility]                        ACC: [id number] EXAM:  CT BRAIN                        ACC: [id number] EXAM:  CT ANGIO BRAIN (W)[facility]                          PROCEDURE DATE:  02/02/2022      INTERPRETATION:  HISTORY: Code stroke, hemorrhage follow-up    COMPARISON: CT from earlier the same date    TECHNIQUE: Noncontrast CT head and CT angiogram of the neck and brain was   performed after administration of intravenous contrast. MIP and 3D   reconstructions were performed.    Total of 70 cc Omnipaque 300 intravenous contrast were administered   without complication.    FINDINGS:    CT HEAD:    Progression of petechial hemorrhage and cytotoxic edema in the right   parieto-occipital infarct. Irregular hypodensity in the superior right   cerebellum, age indeterminate. No hydrocephalus. No central herniation.   Basal cisterns are clear.      CTA BRAIN    INTERNAL CAROTID ARTERIES:  The intracranial segments of the ICA are   patent without hemodynamically significant stenosis, occlusion, or   aneurysm. The ICA bifurcations are unremarkable.    ANTERIOR CEREBRAL ARTERIES: No flow-limiting stenosis or occlusion.   Anterior communicating artery is unremarkable without aneurysm.    MIDDLE CEREBRAL ARTERIES: No flow-limiting stenosis or occlusion. MCA   bifurcations are unremarkable without aneurysm.    POSTERIOR CEREBRAL ARTERIES: No flow-limiting stenosis or occlusion.   Fetal origin of the left PCA. Well-developed right posterior   communicating artery..    VERTEBROBASILAR SYSTEM: No flow-limiting stenosis or occlusion. Basilar   tip is unremarkable.    CTA NECK    RIGHT CAROTID SYSTEM: Normal in course and caliber without flow-limiting   stenosis or occlusion.    LEFT CAROTID SYSTEM: Normal in course and caliber without flow-limiting   stenosis or occlusion.    VERTEBRAL SYSTEM:  Normal in course and caliber  without flow-limiting   stenosis or occlusion. Origin of the right vertebral artery appears   markedly stenotic, however evaluation is degraded by regional motion   artifact. The right superior cerebellar artery is patent but attenuated   compared to the left.    AORTIC ARCH: Bovine arch branching variant. Origin of the great arteries   is patent.    Miscellaneous: Mediastinal adenopathy.    IMPRESSION:    CT HEAD:  1. Progression of cytotoxic edema and petechial hemorrhage in the right   parieto-occipital infarct.  2. Age-indeterminate ischemia/infarct in the superior right cerebellum.    CTA BRAIN:  1. Patent intracranial circulation. No large vessel occlusion or   significant stenosis..    CTA NECK:  1. Patent cervical vasculature. Possible severe stenosis at the origin of   the right vertebral artery versus artifact from regional motion.    Discussed with Dr. Monaoc on 2/2/2022 at 4:06 AM.    --- End of Report ---  ALEX LEUNG MD; Resident Radiologist  This document has been electronically signed.  EN RUSSO MD; Attending Radiologist  This document has been electronically signed. Feb 2 2022  4:55AM    < end of copied text >  < from: CT Perfusion w/ Maps w/ IV Cont (02.02.22 @ 03:15) >  ACC: [id number] EXAM:  CT PERFUSION W MAPS IC                          PROCEDURE DATE:  02/02/2022      INTERPRETATION:  HISTORY:  Code stroke    COMPARISON: None    TECHNIQUE: Perfusion CT through the brain obtained during separate bolus   injection of intravenous contrast.  Processing of the CT perfusion data,   specifically, maps of mean transit time, cerebral blood flow and cerebral   blood volume were generated using iSchemaView RAPID.    Total of 50 cc Omnipaque 300 intravenous contrastwere administered   without complication. 50 cc intravenous contrast discarded.    FINDINGS:    CT PERFUSION:    This study was terminated during the course of imaging acquisition due to   clinical instability in the patient.    IMPRESSION: Nondiagnostic perfusion CT    --- End of Report ---  ALEX LEUNG MD; Resident Radiologist  This document has been electronically signed.  EN RUSSO MD; Attending Radiologist  This document has been electronically signed. Feb 2 2022  4:37AM    < end of copied text >  < from: Xray Chest 1 View- PORTABLE-Urgent (02.02.22 @ 01:14) >  ACC: [id number] EXAM:  XR CHEST PORTABLE URGENT 1V                          PROCEDURE DATE:  02/02/2022      INTERPRETATION:  CLINICAL INDICATION: Stroke Code    TECHNIQUE: Single frontal, portable view of the chest was obtained.    COMPARISON: Chest Radiograph dated 10/29/2017    FINDINGS:    Heart size cannot be assessed on this projection.  Mild pulmonary vascular congestion.  The visualized osseous structures demonstrate no acute pathology.      IMPRESSION:  Pulmonary vascular congestion.    --- End of Report ---  ALEX LEUNG MD; Resident Radiologist  This document has been electronically signed.  TUCKER SIMON MD; Attending Radiologist  This document has been electronically signed. Feb 2 2022 12:57PM    < end of copied text >  < from: CT Brain Stroke Protocol (02.02.22 @ 00:42) >  ACC: [id number] EXAM:  CT BRAIN STROKE PROTOCOL                          PROCEDURE DATE:  02/02/2022      INTERPRETATION:  EXAMINATION: CT HEAD STROKE PROTOCOL    DATE: 2/2/2022 12:42 AM    INDICATION: Code stroke, altered mental status    COMPARISON: CT head 10/31/2017    TECHNIQUE: Thin section noncontrast axial images were obtained through   the head. RAPID AI was utilized for preliminary assessment of   intracranial hemorrhage.  .    FINDINGS:    Gyriform hyperdensity in the right parietal and occipital lobes with   surrounding cytotoxic edema. No hydrocephalus. No central herniation.   Basilar cisterns are clear. Scattered moderate chronic microvascular   ischemic change.    The scalp and imaged midfacial soft tissues are unremarkable. Calvarium   is intact. Scattered paranasal sinus mucosal thickening. Mastoid air   cells are clear.    IMPRESSION:    1. Hemorrhagic infarct in the right PCA territory.    Findings were discussed with Dr. Monaco on 2/2/2022 at 12:46 AM.    --- Endof Report ---  ALEX LEUNG MD; Resident Radiologist  This document has been electronically signed.  EN RUSSO MD; Attending Radiologist  This document has been electronically signed. Feb 2 2022 12:46AM    < end of copied text >     OTHER: 	  	  LABS:	 	    CARDIAC MARKERS:                  9.9    10.70 )-----------( 364      ( 02 Feb 2022 10:52 )             31.4     02-02    135  |  98  |  15  ----------------------------<  293<H>  4.9   |  24  |  1.01    Ca    9.8      02 Feb 2022 10:54  Phos  3.8     02-02  Mg     1.9     02-02    TPro  9.2<H>  /  Alb  4.2  /  TBili  0.4  /  DBili  x   /  AST  17  /  ALT  23  /  AlkPhos  205<H>  02-02    proBNP:   Lipid Profile:   HgA1c:   TSH:

## 2022-02-02 NOTE — SWALLOW BEDSIDE ASSESSMENT ADULT - SPECIFY REASON(S)

## 2022-02-02 NOTE — CONSULT NOTE ADULT - PROBLEM SELECTOR RECOMMENDATION 9
COVID PCR + 2/2  -CXR grossly clear  -Vaccination status or hx of recent COVID infection unclear, appears to be asymptomatic  -Attempted to reach out to multiple family members - all numbers tried either with no answer or number out of service. Per RN, granddaughter did call earlier in shift. Will take contact info with next phone call  -Reportedly with hypoxia to low 90s on admission, currently with O2 sats 96% on RA  -Would monitor off treatment for now. If O2 requirements increase will consider Dexamethasone  -Trend inflammatory markers  -DVT ppx  -F/u LE duplex  -F/u COVID antibody results  -Check CT chest to r/o PNA. COVID PCR + 2/2  -CXR grossly clear  -No recent COVID infection per family, vaccinated x2 doses, did not receive booster   -Reportedly with hypoxia to low 90s on admission, currently with O2 sats 96% on RA  -Would monitor off treatment for now. If O2 requirements increase will consider Dexamethasone  -Mild hypoxia likely 2nd to atelectasis rather than COVID PNA   -Trend inflammatory markers  -DVT ppx  -F/u LE duplex  -Check CT chest to r/o PNA.

## 2022-02-02 NOTE — CONSULT NOTE ADULT - PROBLEM SELECTOR RECOMMENDATION 9
R parieto-occipital lobe petechial hemorrhage and possible infarct with age indeterminate in superior R cerebellum  frequent neuro checks   aspiration precautions  orders per neuro team

## 2022-02-02 NOTE — SWALLOW BEDSIDE ASSESSMENT ADULT - SLP GENERAL OBSERVATIONS
Pt was received at bedside awake. +NC (3L), +tele (HR: 86, O2: 100, RR: 20). Pt was AAOx2 to self (full name and ) and general location (hospital). +moderate dysarthria with occasional incoherent mumbling, +expressive and receptive language deficits, +cognitive-linguistic deficits. Able to follow ~40% of basic commands, inconsistent accuracy with basic Y/N questions (see speech language evaluation note for full details). +leaning towards L side (required occasional re-positioning to midline). Pt distracted requiring frequent re-direction to the task.

## 2022-02-02 NOTE — OCCUPATIONAL THERAPY INITIAL EVALUATION ADULT - PERTINENT HX OF CURRENT PROBLEM, REHAB EVAL
89 y/o F with PMH of HTN, HLD, ?TIA,. Presents to ED for change in mental status, L facial droop, L hemiparesis. Found to have R parieto-occipital lobe petechial hemorrhage and possible infarct with age indeterminate in superior R cerebellum.. Covid + on screen

## 2022-02-02 NOTE — SPEECH LANGUAGE PATHOLOGY EVALUATION - SLP GENERAL OBSERVATIONS
Pt was received at bedside awake. +NC (3L), +tele (HR: 86, O2: 100, RR: 20), +leaning towards L side (required occasional re-positioning to midline). Waxing/waning alertness throughout evaluation.

## 2022-02-02 NOTE — SWALLOW BEDSIDE ASSESSMENT ADULT - SWALLOW EVAL: DIAGNOSIS
89 y/o RH F who presented to the ED for change in mental status, slumping over, L facial droop, dysarthria and possible L hemiparesis, found with R parieto-occipital lobe petechial hemorrhage and possible infarct with age indeterminate infarct in superior R cerebellum. Pt was seen today for bedside swallow evaluation which revealed oral dysphagia and suspected pharyngeal dysphagia. The swallow sequence was characterized by reduced AP bolus transfer of puree textures (~10 seconds), suspected delayed trigger of pharyngeal swallow, reduced hyolaryngeal elevation upon palpation, multiple swallows (x2) with thin liquids, and overt clinical s/s of aspiration or penetration (throat clearing post intake of thin liquids). No overt clinical s/s of aspiration or penetration with mildly thick liquids via tsp/straw or puree textures. [place] not trialed given oral stage deficits.

## 2022-02-02 NOTE — CONSULT NOTE ADULT - ASSESSMENT
89 y/o F with PMH of HTN, HLD, ?TIA,. Presents to ED for change in mental status, L facial droop, L hemiparesis. Found to have R parieto-occipital lobe petechial hemorrhage and possible infarct with age indeterminate in superior R cerebellum. COVID PCR on admission. Pulmonary called to consult for hypoxia, reportedly spo2 low 90s on arrival. CXR grossly clear. O2 sats 100% on 2LNC, 96% on RA.    89 y/o F with PMH of HTN, HLD, ?TIA,. Presents to ED for change in mental status, L facial droop, L hemiparesis. Found to have R parieto-occipital lobe petechial hemorrhage and possible infarct with age indeterminate in superior R cerebellum. COVID PCR on admission. Pulmonary called to consult for hypoxia, reportedly spo2 low 90s on arrival. CXR grossly clear. Spoke with daughter Feli (), notes mother is COVID vaccinated x2 doses, did not receive booster. No hx of lung disease, never smoker. No prior COVID infections. Unable to participate in ROS 2nd to mental status. O2 sats 100% on 2LNC, 96% on RA.

## 2022-02-02 NOTE — ED PROVIDER NOTE - OBJECTIVE STATEMENT
89 year old female hx HLD, HTN, DM on insulin, TIA presents for weakness. LKN was 11am. Patient was sleeping at 11pm and suddenly called out for her family. Was unable to stand. Was weak, leaning to the L, developed L sided facial droop, and LUE and LLE weakness. Family called 911. Was hypoxic with FS in 200s by EMS. Code Stroke called on arrival and pt taken to CT scan where she vomited once, but was protecting her airway. 89 year old female hx HLD, HTN, DM on insulin, TIA presents for weakness. LKN was 11am. Patient was sleeping at 11a and suddenly called out for her family. Was unable to stand. Was weak, leaning to the L, developed L sided facial droop, and LUE and LLE weakness. Family called 911. Was hypoxic with FS in 200s by EMS. Code Stroke called on arrival and pt taken to CT scan where she vomited once, but was protecting her airway.

## 2022-02-02 NOTE — CONSULT NOTE ADULT - ASSESSMENT
Patient is a 89yo RH F PMHx HTN, HLD, ?TIA not on ac/ap agents, who presents to ED for change in mental status, slumping over, L facial droop, dysarthria and possible L hemiparesis.    CVA  - workup as per neurology    COVID 19 pna  - pt is not hypoxic  - keep O2 saturation above 92%  - follow informatory markers  - check ct chest  - pulm is following    elevated d dimer  - mason lower ext doppler    dysphagia  - swallow eval  - Puree diet and Mildly Thick Liquids    dvt px  - sq heparin

## 2022-02-02 NOTE — SPEECH LANGUAGE PATHOLOGY EVALUATION - SLP PERTINENT HISTORY OF CURRENT PROBLEM
89 y/o RH F with PMH of HTN, HLD, ?TIA not on ac/ap agents, who presents to ED for change in mental status, slumping over, L facial droop, and possible L hemiparesis. Patient last seen by accompanying family member at 11 AM 2/1/22 with mild confusion. Patient went to sleep and woke up in evening of 2/1/22 asking for help. Was found slumped over, slurred speech and L facial droop. EMS called. At baseline, able to feed herself, use bathroom herself. Ambulates with walker. Needs assistance with other ADLs. NIHSS: 16. preMRS: 3. ICH: 2. CTH revealed gyriform hyperdensity in the right parietal and occipital lobes w/ surrounding cytotoxic edema. Scattered moderate chronic microvascular ischemic change. Unable to obtain CTA/P during code stroke as patient started drooling and spitting up in CT scan and had episode of desaturation. Patient removed from CT scan and stabilized. Not a tPA candidate 2/2 outside window and hemorrhage noted on CT. Not a thrombectomy candidate since no brigitte LVO.

## 2022-02-02 NOTE — SPEECH LANGUAGE PATHOLOGY EVALUATION - CONFRONTATIONAL NAMING
20% accuracy with object ID task; 0% accuracy with picture ID task. Phonemic paraphasias and neologisms noted/impaired

## 2022-02-02 NOTE — CONSULT NOTE ADULT - ASSESSMENT
Patient is a 89yo F PMHx HTN, HLD, ?TIA not on ac/ap agents, who presents to ED for change in mental status, slumping over, L facial droop, and possible L hemiparesis.    CTH:  R parieto-occipital lobe petechial hemorrhage and possible infarct with age indeterminate infarct in superior R cerebellum. Possible severe stenosis at the origin of the right vertebral artery versus artifact from regional motion.

## 2022-02-02 NOTE — PATIENT PROFILE ADULT - FALL HARM RISK - HARM RISK INTERVENTIONS
Assistance OOB with selected safe patient handling equipment/Communicate Risk of Fall with Harm to all staff/Monitor for mental status changes/Move patient closer to nurses' station/Provide patient with walking aids - walker, cane, crutches/Reinforce activity limits and safety measures with patient and family/Reorient to person, place and time as needed/Tailored Fall Risk Interventions/Toileting schedule using arm’s reach rule for commode and bathroom/Use of alarms - bed, chair and/or voice tab/Visual Cue: Yellow wristband and red socks/Bed in lowest position, wheels locked, appropriate side rails in place/Call bell, personal items and telephone in reach/Instruct patient to call for assistance before getting out of bed or chair/Non-slip footwear when patient is out of bed/[place] to call system/Physically safe environment - no spills, clutter or unnecessary equipment/Purposeful Proactive Rounding/Room/bathroom lighting operational, light cord in reach

## 2022-02-02 NOTE — OCCUPATIONAL THERAPY INITIAL EVALUATION ADULT - TRANSFER TRAINING, PT EVAL
Pt will perform sit <-> stand transfers with min A within 4 weeks. Patient will transfer to toilet with DME as needed with minimal assistance in 4 weeks

## 2022-02-02 NOTE — SWALLOW BEDSIDE ASSESSMENT ADULT - ASR SWALLOW RECOMMEND DIAG
Will defer objective evaluation at this time. Will f/u for assessment of diet tolerance and ?diet upgrade at bedside vs. objective evaluation of swallow.

## 2022-02-02 NOTE — SWALLOW BEDSIDE ASSESSMENT ADULT - ORAL PREPARATORY PHASE
Within functional limits Poor lip seal around cup; Able to form seal around straw and adequate oral grading of tsp

## 2022-02-02 NOTE — ED PROVIDER NOTE - PHYSICAL EXAMINATION
General appearance: patient appears weak, answers mostly to her daughter.   Eyes: anicteric sclerae, moist conjunctivae; pupils small but reactive with L sided deviation.   HENT: Atraumatic; with sputum in non-rebreather   Neck: Trachea midline;    Pulm: Lungs clear to auscultation bilaterally, with normal respiratory effort and no intercostal retractions; normal work of breathing   CV: Regular Rhythm and Rate; Normal S1, S2; No murmurs, rubs, or gallops. 2+ peripheral pulses.   Abdomen: Soft, non-tender, non-distended;    Extremities: No peripheral edema  Neuro : +L sided facial droop. Difficult to assess pronator drift, possibly more pronounced on L side.  strength feels equal on both sides.    Skin: Normal temperature, turgor and texture;

## 2022-02-02 NOTE — ED PROVIDER NOTE - ATTENDING CONTRIBUTION TO CARE
Agree with above except noted:    h.o TIA, HTN, DM, p.w acute onset of ams and L facial drooping. LKN about 14 hrs ago. patient is nonverbal on arrival and has facial droop. unable to follow commands. stroke code was called but patient was unable to tolerate CTA due to vomiting.  never desat. but questionable airway, if emesis persist will intubate. concerning for possible CVA vs ICH. no sign of trauma or deformity of extremities on exam. possible metabolic encephalopathy and will r.o infectious etiology. will get comprehensive labs to evaluate for hematologic abnormality, renal function, electrolyte derangement for possible symptom etiology. stroke scans.

## 2022-02-02 NOTE — H&P ADULT - HISTORY OF PRESENT ILLNESS
HPI: Patient is a 89yo RH F PMHx HTN, HLD, ?TIA not on ac/ap agents, who presents to ED for change in mental status and slumping over. Patient last seen by accompanying family member at 11 AM 2/1/22 with mild confusion. Patient went to sleep and woke up in evening of 2/1/22 asking for help. Was found slumped over, slurred speech and L facial droop. EMS called for concern of stroke. At baseline, able to feed herself, use bathroom herself. Ambulates with walker. Needs assistance with other ADLs.     Patient unable to provide ROS.    (Stroke only)  NIHSS: 16  preMRS: 3   ICH: 2   HPI: Patient is a 89yo RH F PMHx HTN, HLD, ?TIA not on ac/ap agents, who presents to ED for change in mental status, slumping over, L facial droop, and possible L hemiparesis. Patient last seen by accompanying family member at 11 AM 2/1/22 with mild confusion. Patient went to sleep and woke up in evening of 2/1/22 asking for help. Was found slumped over, slurred speech and L facial droop. EMS called for concern of stroke. At baseline, able to feed herself, use bathroom herself. Ambulates with walker. Needs assistance with other ADLs.     Patient unable to provide ROS.    (Stroke only)  NIHSS: 16  preMRS: 3   ICH: 2

## 2022-02-02 NOTE — CONSULT NOTE ADULT - ASSESSMENT
LKW:  NIHSS:    Baseline MRS:  Not a tPA candidate due to   Not a thrombectomy candidate due to absent large vessel occlusion/ or based on NIHSS    CT head: Gyriform hyperdensity in the right parietal and occipital lobes with surrounding cytotoxic edema. No hydrocephalus. No central herniation. Basilar cisterns are clear. Scattered moderate chronic microvascular ischemic change. Unable to obtain CTA/P during code stroke as patient started drooling and spitting up in CT scan and had episode of desaturation. Patient removed from CT scan and stabilized in trauma room by ED staff.     Impression: symptoms 2/2 occlusion  Also in differential is seizures given     Mechanism:  unknown    Recommendations: INCOMPLETE  [] repeat CT head in 4 hours for stability assessment of hemorrhage  [] toxic/infectious/ metabolic w/u per primary team  [] Atorvastatin 40- 80 mg daily (long-term goal and titrate to LDL < 70)  [] HgbA1C, fasting lipid panel, CBC, CMP, coag panel, troponin  [] MRI brain w/o, CTA head/neck con when able  [] TTE w/ bubble study  [] telemetry to check for arrhythmia, EKG while here  [] Address GOC w/ family  [] rEEG awake/asleep    - Tight glucose control (long-term goal HgbA1c < 6%)   - Neuro-checks and VS q4h  - Permissive HTN up to 220/120 for 24-48h from symptom onset  - NPO for now  - STAT CT head non-contrast for change in neuro exam.   - PT/ OT / CM/ SW evaluations  - DVT ppx: Lovenox 40 mg Sq vs heparin subq unless contraindicated in which case SCDs     Discussed with stroke fellow ( ) and under supervision of attending ( ) regarding decision against candidacy for tPA/ thrombectomy. Will be formally staffed on morning rounds with attending. Recommendations will be complete once signed by attending.   CK wnl.    LKW: 11 AM 2/1/22  NIHSS:  16  Baseline MRS: 3   Not a tPA candidate due to outside window and hemorrhage noted on CT  Not a thrombectomy candidate since unable to obtain CTA as patient had emesis in CT scanner. Will reassess when medically stable to obtain imaging.    CT head: Gyriform hyperdensity in the right parietal and occipital lobes with surrounding cytotoxic edema. No hydrocephalus. No central herniation. Basilar cisterns are clear. Scattered moderate chronic microvascular ischemic change. Unable to obtain CTA/P during code stroke as patient started drooling and spitting up in CT scan and had episode of desaturation. Patient removed from CT scan and stabilized in trauma room by ED staff.     Impression: symptoms 2/2 occlusion  Also in differential is seizures given     Mechanism:  unknown    Recommendations: INCOMPLETE  [] repeat CT head in 4 hours for stability assessment of hemorrhage  [] toxic/infectious/ metabolic w/u per primary team  [] Atorvastatin 40- 80 mg daily (long-term goal and titrate to LDL < 70)  [] HgbA1C, fasting lipid panel, CBC, CMP, coag panel, troponin  [] MRI brain w/o, CTA head/neck con when able  [] TTE w/ bubble study  [] telemetry to check for arrhythmia, EKG while here  [] Address GOC w/ family  [] rEEG awake/asleep    - Tight glucose control (long-term goal HgbA1c < 6%)   - Neuro-checks and VS q4h  - Permissive HTN up to 220/120 for 24-48h from symptom onset  - NPO for now  - STAT CT head non-contrast for change in neuro exam.   - PT/ OT / CM/ SW evaluations  - DVT ppx: Lovenox 40 mg Sq vs heparin subq unless contraindicated in which case SCDs     Discussed with stroke fellow ( ) and under supervision of attending ( ) regarding decision against candidacy for tPA/ thrombectomy. Will be formally staffed on morning rounds with attending. Recommendations will be complete once signed by attending.

## 2022-02-02 NOTE — SPEECH LANGUAGE PATHOLOGY EVALUATION - COMMENTS
Hx cont: Impression: change in mental status, slumping over, L facial droop, dysarthria and possible L hemiparesis found with R parieto-occipital lobe petechial hemorrhage and possible infarct with age indeterminate infarct in superior R cerebellum. Possible severe stenosis at the origin of the right vertebral artery versus artifact from regional motion. Mechanism: unknown.     [id number]  IMPRESSION:  CT HEAD: 1. Progression of cytotoxic edema and petechial hemorrhage in the right parieto-occipital infarct.  2. Age-indeterminate ischemia/infarct in the superior right cerebellum.    CTA BRAIN: 1. Patent intracranial circulation. No large vessel occlusion or significant stenosis..    CTA NECK: 1. Patent cervical vasculature. Possible severe stenosis at the origin of the right vertebral artery versus artifact from regional motion.    CXR: 22  IMPRESSION:  Clear lungs.    Pt is known to the Speech Pathology service from bedside swallow evaluation at [facility] on 14 which revealed "Functional oral management skills and mild delay in swallow trigger for puree, soft solids and thin fluids without signs/symptoms of laryngeal penetration/aspiration." Recs at that time: "Soft solid diet and thin fluids." GOALS:  1. Pt will improve expressive language skills for functional communication.  2. Pt will improve receptive language skills for functional communication.  3. Pt will improve cognitive-linguistic skills for functional communication.   4. Pt will improve dysarthria/speech intelligibility for functional communication.

## 2022-02-02 NOTE — CONSULT NOTE ADULT - PROBLEM SELECTOR RECOMMENDATION 2
Spo2 low 90s on admission  -Currently 100% on 2LNC, 96% on RA  -? if mild hypoxia 2nd to COVID or atelectasis/overall condition  -CXR grossly clear  -Monitor on RA, keep sats >92% with supplemental O2 PRN  -Check CT chest.
4L NC  oxygen supplementation as needed.  wean as tolerated maintaining O2>92%

## 2022-02-02 NOTE — H&P ADULT - ASSESSMENT
Patient is a 89yo RH F PMHx HTN, HLD, ?TIA not on ac/ap agents, who presents to ED for change in mental status, slumping over, L facial droop, dysarthria and possible L hemiparesis. Vs 97.7F, HR 70, 136/61, RR18, 100%RA. CK wnl.    LKW: 11 AM 2/1/22  NIHSS:  16  Baseline MRS: 3   Not a tPA candidate due to outside window and hemorrhage noted on CT  Not a thrombectomy candidate since no brigitte LVO.     CT head: Gyriform hyperdensity in the right parietal and occipital lobes with surrounding cytotoxic edema. No hydrocephalus. No central herniation. Basilar cisterns are clear. Scattered moderate chronic microvascular ischemic change. Unable to obtain CTA/P during code stroke as patient started drooling and spitting up in CT scan and had episode of desaturation. Patient removed from CT scan and stabilized in trauma room by ED staff.     CTH/A repeat   1. Progression of cytotoxic edema and petechial hemorrhage in the right parieto-occipital infarct.  2. Age-indeterminate ischemia/infarct in the superior right cerebellum.  CTA BRAIN: Patent intracranial circulation. No large vessel occlusion or significant stenosis.  CTA NECK: Patent cervical vasculature. Possible severe stenosis at the origin of the right vertebral artery versus artifact from regional motion.    Impression: change in mental status, slumping over, L facial droop, dysarthria and possible L hemiparesis found with R parieto-occipital lobe petechial hemorrhage and possible infarct with age indeterminate infarct in superior R cerebellum. Possible severe stenosis at the origin of the right vertebral artery versus artifact from regional motion.    Mechanism:  unknown    Recommendations:    [] repeat CT head in 4-6 hours for stability  [] avoid antiplatlets and anticoagulants   [] will consider consult neurosurgery in case of swelling of the cerebellar stroke.  [] coags  [] toxic/infectious/ metabolic w/u per primary team  [] Atorvastatin 40- 80 mg daily (long-term goal and titrate to LDL < 70)  [] HgbA1C, fasting lipid panel, CBC, CMP, coag panel, troponin  [] MRI brain w/o, CTA head/neck con when able  [] TTE w/ bubble study  [] telemetry to check for arrhythmia, EKG while here  [] Address GOC w/ family  [] rEEG awake/asleep    - Tight glucose control (long-term goal HgbA1c < 6%)   - Neuro-checks and VS q2hr  - -150 mmhg goal  - NPO for now  - STAT CT head non-contrast for change in neuro exam.   - PT/ OT / CM/ SW evaluations  - DVT ppx scd    Discussed with stroke fellow Dr Evan Coffey and under supervision of attending Dr Arnol Zacarias regarding decision against candidacy for tPA/ thrombectomy. Will be formally staffed on morning rounds with attending. Recommendations will be complete once signed by attending.

## 2022-02-02 NOTE — ED PROVIDER NOTE - PROGRESS NOTE DETAILS
Fabian Barnes MD, Attending: spoke with neuro team who touch base with nicu for q2 neuro check since stroke unit is full. Patient pending CTA reads.

## 2022-02-02 NOTE — SWALLOW BEDSIDE ASSESSMENT ADULT - COMMENTS
Hx cont: Impression: change in mental status, slumping over, L facial droop, dysarthria and possible L hemiparesis found with R parieto-occipital lobe petechial hemorrhage and possible infarct with age indeterminate infarct in superior R cerebellum. Possible severe stenosis at the origin of the right vertebral artery versus artifact from regional motion. Mechanism: unknown.     [id number]  IMPRESSION:  CT HEAD: 1. Progression of cytotoxic edema and petechial hemorrhage in the right parieto-occipital infarct.  2. Age-indeterminate ischemia/infarct in the superior right cerebellum.    CTA BRAIN: 1. Patent intracranial circulation. No large vessel occlusion or significant stenosis..    CTA NECK: 1. Patent cervical vasculature. Possible severe stenosis at the origin of the right vertebral artery versus artifact from regional motion.    CXR: 22  IMPRESSION:  Clear lungs.    Pt is known to the Speech Pathology service from bedside swallow evaluation at [facility] on 14 which revealed "Functional oral management skills and mild delay in swallow trigger for puree, soft solids and thin fluids without signs/symptoms of laryngeal penetration/aspiration." Recs at that time: "Soft solid diet and thin fluids."

## 2022-02-02 NOTE — CONSULT NOTE ADULT - SUBJECTIVE AND OBJECTIVE BOX
PULMONARY CONSULT    HPI: 89 y/o F with PMH of HTN, HLD, ?TIA,. Presents to ED for change in mental status, L facial droop, L hemiparesis. Found to have R parieto-occipital lobe petechial hemorrhage and possible infarct with age indeterminate in superior R cerebellum. COVID PCR on admission. Pulmonary called to consult for hypoxia, reportedly spo2 low 90s on arrival.     PAST MEDICAL & SURGICAL HISTORY:  HTN (hypertension)  HLD (hyperlipidemia)  DM (diabetes mellitus)  Hypothyroidism  TIA (transient ischemic attack)  S/P Cholecystectomy    No Known Allergies    FAMILY HISTORY:    Social history:     Review of Systems: Unable to obtain 2nd to mental status     MEDICATIONS  (STANDING):  brimonidine 0.2% Ophthalmic Solution 1 Drop(s) Both EYES two times a day  dextrose 40% Gel 15 Gram(s) Oral once  dextrose 5%. 1000 milliLiter(s) (50 mL/Hr) IV Continuous <Continuous>  dextrose 5%. 1000 milliLiter(s) (100 mL/Hr) IV Continuous <Continuous>  dextrose 50% Injectable 25 Gram(s) IV Push once  dextrose 50% Injectable 12.5 Gram(s) IV Push once  dextrose 50% Injectable 25 Gram(s) IV Push once  glucagon  Injectable 1 milliGRAM(s) IntraMuscular once  heparin   Injectable 5000 Unit(s) SubCutaneous every 12 hours  insulin lispro (ADMELOG) corrective regimen sliding scale   SubCutaneous every 6 hours  lactated ringers. 1000 milliLiter(s) (75 mL/Hr) IV Continuous <Continuous>  latanoprost 0.005% Ophthalmic Solution 1 Drop(s) Both EYES at bedtime  levothyroxine 88 MICROGram(s) Oral daily    Vital Signs Last 24 Hrs  T(C): 36.6 (2022 07:46), Max: 36.6 (2022 07:46)  T(F): 97.8 (2022 07:46), Max: 97.8 (2022 07:46)  HR: 75 (2022 07:46) (65 - 76)  BP: 151/80 (2022 07:46) (135/69 - 151/80)  BP(mean): 85 (2022 02:46) (85 - 85)  RR: 16 (2022 07:46) (12 - 18)  SpO2: 100% (2022 07:46) (95% - 100%)    VBG pH 7.36 - @ 00:40  VBG pCO2 47 - @ 00:40  VBG O2 sat 94.3  @ 00:40  VBG lactate 2.2  @ 00:40    LABS:                        10.1   8.31  )-----------( 367      ( 2022 00:37 )             32.0         139  |  104  |  15  ----------------------------<  230<H>  4.2   |  22  |  1.16    Ca    9.6      2022 00:37    TPro  9.1<H>  /  Alb  4.1  /  TBili  0.4  /  DBili  x   /  AST  21  /  ALT  25  /  AlkPhos  212<H>      CARDIAC MARKERS ( 2022 00:37 )  x     / x     / 65 U/L / x     / x        CAPILLARY BLOOD GLUCOSE  POCT Blood Glucose.: 217 mg/dL (2022 00:09)    Urinalysis Basic - ( 2022 01:00 )    Color: Colorless / Appearance: Clear / S.008 / pH: x  Gluc: x / Ketone: Negative  / Bili: Negative / Urobili: Negative   Blood: x / Protein: Trace / Nitrite: Negative   Leuk Esterase: Negative / RBC: 1 /hpf / WBC 0 /HPF   Sq Epi: x / Non Sq Epi: 0 /hpf / Bacteria: Negative    Physical Examination:    General: No acute distress.      HEENT: Pupils equal, reactive to light.  Symmetric.    PULM:     CVS:     ABD: Soft, nondistended, nontender, normoactive bowel sounds, no masses    EXT: No edema, nontender    SKIN: Warm and well perfused, no rashes noted.    NEURO: Alert, oriented, interactive, nonfocal    RADIOLOGY REVIEWED  CXR: grossly clear     < from: CT Head No Cont (22 @ 03:59) >    FINDINGS:    CT HEAD:    Progression of petechial hemorrhage and cytotoxic edema in the right   parieto-occipital infarct. Irregular hypodensity in the superior right   cerebellum, age indeterminate. No hydrocephalus. No central herniation.   Basal cisterns are clear.      CTA BRAIN    INTERNAL CAROTID ARTERIES:  The intracranial segments of the ICA are   patent without hemodynamically significant stenosis, occlusion, or   aneurysm. The ICA bifurcations are unremarkable.    ANTERIOR CEREBRAL ARTERIES: No flow-limiting stenosis or occlusion.   Anterior communicating artery is unremarkable without aneurysm.    MIDDLE CEREBRAL ARTERIES: No flow-limiting stenosis or occlusion. MCA   bifurcations are unremarkable without aneurysm.    POSTERIOR CEREBRAL ARTERIES: No flow-limiting stenosis or occlusion.   Fetal origin of the left PCA. Well-developed right posterior   communicating artery..    VERTEBROBASILAR SYSTEM: No flow-limiting stenosis or occlusion. Basilar   tip is unremarkable.    CTA NECK    RIGHT CAROTID SYSTEM: Normal in course and caliber without flow-limiting   stenosis or occlusion.    LEFT CAROTID SYSTEM: Normal in course and caliber without flow-limiting   stenosis or occlusion.    VERTEBRAL SYSTEM:  Normal in course and caliber  without flow-limiting   stenosis or occlusion. Origin of the right vertebral artery appears   markedly stenotic, however evaluation is degraded by regional motion   artifact. The right superior cerebellar artery is patent but attenuated   compared to the left.    AORTIC ARCH: Bovine arch branching variant. Origin of the great arteries   is patent.    Miscellaneous: Mediastinal adenopathy.    IMPRESSION:    CT HEAD:  1. Progression of cytotoxic edema and petechial hemorrhage in the right   parieto-occipital infarct.  2. Age-indeterminate ischemia/infarct in the superior right cerebellum.    CTA BRAIN:  1. Patent intracranial circulation. No large vessel occlusion or   significant stenosis..    CTA NECK:  1. Patent cervical vasculature. Possible severe stenosis at the origin of   the right vertebral artery versus artifact from regional motion.    Discussed with Dr. Monaco on 2022 at 4:06 AM.    --- End of Report ---      < end of copied text >     PULMONARY CONSULT    HPI: 89 y/o F with PMH of HTN, HLD, ?TIA,. Presents to ED for change in mental status, L facial droop, L hemiparesis. Found to have R parieto-occipital lobe petechial hemorrhage and possible infarct with age indeterminate in superior R cerebellum. COVID PCR on admission. Pulmonary called to consult for hypoxia, reportedly spo2 low 90s on arrival. CXR grossly clear. Attempted many times to call family members for history (all numbers listed in [facility]), with no answer/phone number out of service Unable to participate in ROS 2nd to mental status. O2 sats 100% on 2LNC, 96% on RA.     PAST MEDICAL & SURGICAL HISTORY:  HTN (hypertension)  HLD (hyperlipidemia)  DM (diabetes mellitus)  Hypothyroidism  TIA (transient ischemic attack)  S/P Cholecystectomy    No Known Allergies    FAMILY HISTORY: Unable to obtain 2nd to mental status     Social history: Unable to obtain 2nd to mental status     Review of Systems: Unable to obtain 2nd to mental status     MEDICATIONS  (STANDING):  brimonidine 0.2% Ophthalmic Solution 1 Drop(s) Both EYES two times a day  dextrose 40% Gel 15 Gram(s) Oral once  dextrose 5%. 1000 milliLiter(s) (50 mL/Hr) IV Continuous <Continuous>  dextrose 5%. 1000 milliLiter(s) (100 mL/Hr) IV Continuous <Continuous>  dextrose 50% Injectable 25 Gram(s) IV Push once  dextrose 50% Injectable 12.5 Gram(s) IV Push once  dextrose 50% Injectable 25 Gram(s) IV Push once  glucagon  Injectable 1 milliGRAM(s) IntraMuscular once  heparin   Injectable 5000 Unit(s) SubCutaneous every 12 hours  insulin lispro (ADMELOG) corrective regimen sliding scale   SubCutaneous every 6 hours  lactated ringers. 1000 milliLiter(s) (75 mL/Hr) IV Continuous <Continuous>  latanoprost 0.005% Ophthalmic Solution 1 Drop(s) Both EYES at bedtime  levothyroxine 88 MICROGram(s) Oral daily    Vital Signs Last 24 Hrs  T(C): 36.6 (2022 07:46), Max: 36.6 (2022 07:46)  T(F): 97.8 (2022 07:46), Max: 97.8 (2022 07:46)  HR: 75 (2022 07:46) (65 - 76)  BP: 151/80 (2022 07:46) (135/69 - 151/80)  BP(mean): 85 (2022 02:46) (85 - 85)  RR: 16 (2022 07:46) (12 - 18)  SpO2: 100% (2022 07:46) (95% - 100%)    VBG pH 7.36  @ 00:40  VBG pCO2 47  @ 00:40  VBG O2 sat 94.3  @ 00:40  VBG lactate 2.2  @ 00:40    LABS:                        10.1   8.31  )-----------( 367      ( 2022 00:37 )             32.0         139  |  104  |  15  ----------------------------<  230<H>  4.2   |  22  |  1.16    Ca    9.6      2022 00:37    TPro  9.1<H>  /  Alb  4.1  /  TBili  0.4  /  DBili  x   /  AST  21  /  ALT  25  /  AlkPhos  212<H>  02-    CARDIAC MARKERS ( 2022 00:37 )  x     / x     / 65 U/L / x     / x        CAPILLARY BLOOD GLUCOSE  POCT Blood Glucose.: 217 mg/dL (2022 00:09)    Urinalysis Basic - ( 2022 01:00 )    Color: Colorless / Appearance: Clear / S.008 / pH: x  Gluc: x / Ketone: Negative  / Bili: Negative / Urobili: Negative   Blood: x / Protein: Trace / Nitrite: Negative   Leuk Esterase: Negative / RBC: 1 /hpf / WBC 0 /HPF   Sq Epi: x / Non Sq Epi: 0 /hpf / Bacteria: Negative    Physical Examination:    General: No acute distress.      HEENT: Pupils equal, reactive to light.  Symmetric.    PULM: Decreased at bases    CVS: RRR    ABD: Soft, nondistended, nontender, normoactive bowel sounds, no masses    EXT: No edema, nontender    SKIN: Warm and well perfused, no rashes noted.    NEURO: Lethargic, arousable to voice but not following commands     RADIOLOGY REVIEWED  CXR: grossly clear     < from: CT Head No Cont (22 @ 03:59) >    FINDINGS:    CT HEAD:    Progression of petechial hemorrhage and cytotoxic edema in the right   parieto-occipital infarct. Irregular hypodensity in the superior right   cerebellum, age indeterminate. No hydrocephalus. No central herniation.   Basal cisterns are clear.      CTA BRAIN    INTERNAL CAROTID ARTERIES:  The intracranial segments of the ICA are   patent without hemodynamically significant stenosis, occlusion, or   aneurysm. The ICA bifurcations are unremarkable.    ANTERIOR CEREBRAL ARTERIES: No flow-limiting stenosis or occlusion.   Anterior communicating artery is unremarkable without aneurysm.    MIDDLE CEREBRAL ARTERIES: No flow-limiting stenosis or occlusion. MCA   bifurcations are unremarkable without aneurysm.    POSTERIOR CEREBRAL ARTERIES: No flow-limiting stenosis or occlusion.   Fetal origin of the left PCA. Well-developed right posterior   communicating artery..    VERTEBROBASILAR SYSTEM: No flow-limiting stenosis or occlusion. Basilar   tip is unremarkable.    CTA NECK    RIGHT CAROTID SYSTEM: Normal in course and caliber without flow-limiting   stenosis or occlusion.    LEFT CAROTID SYSTEM: Normal in course and caliber without flow-limiting   stenosis or occlusion.    VERTEBRAL SYSTEM:  Normal in course and caliber  without flow-limiting   stenosis or occlusion. Origin of the right vertebral artery appears   markedly stenotic, however evaluation is degraded by regional motion   artifact. The right superior cerebellar artery is patent but attenuated   compared to the left.    AORTIC ARCH: Bovine arch branching variant. Origin of the great arteries   is patent.    Miscellaneous: Mediastinal adenopathy.    IMPRESSION:    CT HEAD:  1. Progression of cytotoxic edema and petechial hemorrhage in the right   parieto-occipital infarct.  2. Age-indeterminate ischemia/infarct in the superior right cerebellum.    CTA BRAIN:  1. Patent intracranial circulation. No large vessel occlusion or   significant stenosis..    CTA NECK:  1. Patent cervical vasculature. Possible severe stenosis at the origin of   the right vertebral artery versus artifact from regional motion.    Discussed with Dr. Monaco on 2022 at 4:06 AM.    --- End of Report ---      < end of copied text >     PULMONARY CONSULT    HPI: 89 y/o F with PMH of HTN, HLD, ?TIA,. Presents to ED for change in mental status, L facial droop, L hemiparesis. Found to have R parieto-occipital lobe petechial hemorrhage and possible infarct with age indeterminate in superior R cerebellum. COVID PCR on admission. Pulmonary called to consult for hypoxia, reportedly spo2 low 90s on arrival. CXR grossly clear. Spoke with daughter Feli (), notes mother is COVID vaccinated x2 doses, did not receive booster. No hx of lung disease, never smoker. No prior COVID infections. Unable to participate in ROS 2nd to mental status. O2 sats 100% on 2LNC, 96% on RA.     PAST MEDICAL & SURGICAL HISTORY:  HTN (hypertension)  HLD (hyperlipidemia)  DM (diabetes mellitus)  Hypothyroidism  TIA (transient ischemic attack)  S/P Cholecystectomy    No Known Allergies    Social history: never smoker     Review of Systems: Unable to obtain 2nd to mental status     MEDICATIONS  (STANDING):  brimonidine 0.2% Ophthalmic Solution 1 Drop(s) Both EYES two times a day  dextrose 40% Gel 15 Gram(s) Oral once  dextrose 5%. 1000 milliLiter(s) (50 mL/Hr) IV Continuous <Continuous>  dextrose 5%. 1000 milliLiter(s) (100 mL/Hr) IV Continuous <Continuous>  dextrose 50% Injectable 25 Gram(s) IV Push once  dextrose 50% Injectable 12.5 Gram(s) IV Push once  dextrose 50% Injectable 25 Gram(s) IV Push once  glucagon  Injectable 1 milliGRAM(s) IntraMuscular once  heparin   Injectable 5000 Unit(s) SubCutaneous every 12 hours  insulin lispro (ADMELOG) corrective regimen sliding scale   SubCutaneous every 6 hours  lactated ringers. 1000 milliLiter(s) (75 mL/Hr) IV Continuous <Continuous>  latanoprost 0.005% Ophthalmic Solution 1 Drop(s) Both EYES at bedtime  levothyroxine 88 MICROGram(s) Oral daily    Vital Signs Last 24 Hrs  T(C): 36.6 (2022 07:46), Max: 36.6 (2022 07:46)  T(F): 97.8 (2022 07:46), Max: 97.8 (2022 07:46)  HR: 75 (2022 07:46) (65 - 76)  BP: 151/80 (2022 07:46) (135/69 - 151/80)  BP(mean): 85 (2022 02:46) (85 - 85)  RR: 16 (2022 07:46) (12 - 18)  SpO2: 100% (2022 07:46) (95% - 100%)    VBG pH 7.36 - @ 00:40  VBG pCO2 47 - @ 00:40  VBG O2 sat 94.3  @ 00:40  VBG lactate 2.2  @ 00:40    LABS:                        10.1   8.31  )-----------( 367      ( 2022 00:37 )             32.0         139  |  104  |  15  ----------------------------<  230<H>  4.2   |  22  |  1.16    Ca    9.6      2022 00:37    TPro  9.1<H>  /  Alb  4.1  /  TBili  0.4  /  DBili  x   /  AST  21  /  ALT  25  /  AlkPhos  212<H>  02-02    CARDIAC MARKERS ( 2022 00:37 )  x     / x     / 65 U/L / x     / x        CAPILLARY BLOOD GLUCOSE  POCT Blood Glucose.: 217 mg/dL (2022 00:09)    Urinalysis Basic - ( 2022 01:00 )    Color: Colorless / Appearance: Clear / S.008 / pH: x  Gluc: x / Ketone: Negative  / Bili: Negative / Urobili: Negative   Blood: x / Protein: Trace / Nitrite: Negative   Leuk Esterase: Negative / RBC: 1 /hpf / WBC 0 /HPF   Sq Epi: x / Non Sq Epi: 0 /hpf / Bacteria: Negative    Physical Examination:    General: No acute distress.      HEENT: Pupils equal, reactive to light.  Symmetric.    PULM: Decreased at bases    CVS: RRR    ABD: Soft, nondistended, nontender, normoactive bowel sounds, no masses    EXT: No edema, nontender    SKIN: Warm and well perfused, no rashes noted.    NEURO: Lethargic, arousable to voice but not following commands     RADIOLOGY REVIEWED  CXR: grossly clear     < from: CT Head No Cont (22 @ 03:59) >    FINDINGS:    CT HEAD:    Progression of petechial hemorrhage and cytotoxic edema in the right   parieto-occipital infarct. Irregular hypodensity in the superior right   cerebellum, age indeterminate. No hydrocephalus. No central herniation.   Basal cisterns are clear.      CTA BRAIN    INTERNAL CAROTID ARTERIES:  The intracranial segments of the ICA are   patent without hemodynamically significant stenosis, occlusion, or   aneurysm. The ICA bifurcations are unremarkable.    ANTERIOR CEREBRAL ARTERIES: No flow-limiting stenosis or occlusion.   Anterior communicating artery is unremarkable without aneurysm.    MIDDLE CEREBRAL ARTERIES: No flow-limiting stenosis or occlusion. MCA   bifurcations are unremarkable without aneurysm.    POSTERIOR CEREBRAL ARTERIES: No flow-limiting stenosis or occlusion.   Fetal origin of the left PCA. Well-developed right posterior   communicating artery..    VERTEBROBASILAR SYSTEM: No flow-limiting stenosis or occlusion. Basilar   tip is unremarkable.    CTA NECK    RIGHT CAROTID SYSTEM: Normal in course and caliber without flow-limiting   stenosis or occlusion.    LEFT CAROTID SYSTEM: Normal in course and caliber without flow-limiting   stenosis or occlusion.    VERTEBRAL SYSTEM:  Normal in course and caliber  without flow-limiting   stenosis or occlusion. Origin of the right vertebral artery appears   markedly stenotic, however evaluation is degraded by regional motion   artifact. The right superior cerebellar artery is patent but attenuated   compared to the left.    AORTIC ARCH: Bovine arch branching variant. Origin of the great arteries   is patent.    Miscellaneous: Mediastinal adenopathy.    IMPRESSION:    CT HEAD:  1. Progression of cytotoxic edema and petechial hemorrhage in the right   parieto-occipital infarct.  2. Age-indeterminate ischemia/infarct in the superior right cerebellum.    CTA BRAIN:  1. Patent intracranial circulation. No large vessel occlusion or   significant stenosis..    CTA NECK:  1. Patent cervical vasculature. Possible severe stenosis at the origin of   the right vertebral artery versus artifact from regional motion.    Discussed with Dr. Monaco on 2022 at 4:06 AM.    --- End of Report ---      < end of copied text >

## 2022-02-02 NOTE — SPEECH LANGUAGE PATHOLOGY EVALUATION - SLP CONVERSATIONAL SPEECH
Non-fluent and agrammatic with word finding difficulties noted. Describing cookie theft scene: "The woman.... the woman.... the woman."

## 2022-02-02 NOTE — H&P ADULT - NSHPLABSRESULTS_GEN_ALL_CORE
Vital Signs Last 24 Hrs  T(C): 36.4 (02 Feb 2022 02:10), Max: 36.5 (02 Feb 2022 00:32)  T(F): 97.6 (02 Feb 2022 02:10), Max: 97.7 (02 Feb 2022 00:32)  HR: 65 (02 Feb 2022 02:46) (65 - 76)  BP: 137/62 (02 Feb 2022 02:46) (135/69 - 140/64)  BP(mean): 85 (02 Feb 2022 02:46) (85 - 85)  RR: 16 (02 Feb 2022 02:46) (12 - 18)  SpO2: 100% (02 Feb 2022 02:46) (95% - 100%)

## 2022-02-02 NOTE — OCCUPATIONAL THERAPY INITIAL EVALUATION ADULT - LIVES WITH, PROFILE
Pt lives in a pvt home with daughter, daughter assists with ADLs. + rolling walker at baseline./children

## 2022-02-02 NOTE — OCCUPATIONAL THERAPY INITIAL EVALUATION ADULT - ADL RETRAINING, OT EVAL
Patient will dress lower body with minimal assistance, AE as needed in 4 weeks. Patient will dress upper body with minimal assistance in 4 weeks. Pt will self feed (S) within 4 weeks, AE as needed

## 2022-02-02 NOTE — ED PROVIDER NOTE - CLINICAL SUMMARY MEDICAL DECISION MAKING FREE TEXT BOX
89 year old female hx HLD, HTN, DM on insulin, TIA presents for weakness. Concern for R sided stroke. Code Stroke called. Patient covid + incidentally. CTA initially aborted due to movement/vomiting. QTc prolonged, so giving dexamethasone that should work for both nausea and covid treatment. Will obtain CTA. Dispo as per neurology.

## 2022-02-02 NOTE — SPEECH LANGUAGE PATHOLOGY EVALUATION - SLP DIAGNOSIS
89 y/o RH F who presented to the ED for AMS, slumping over, L facial droop, dysarthria and possible L hemiparesis, found with R parieto-occipital lobe petechial hemorrhage and possible infarct with age indeterminate infarct in superior R cerebellum. Pt was seen today for speech language evaluation which revealed expressive and receptive aphasia, cognitive-linguistic deficits, and moderate dysarthria. Receptive language deficits characterized by inconsistent accuracy following basic one step commands and answering basic Y/N questions. Expressive language characterized by non-fluent speech with word-finding deficits and errors with phonemic paraphasias and perseverations. Cognitive-linguistic deficits noted in the domains of attention and orientation (AAOx2). Required frequent re-direction to the task. Pt presents with moderate dysarthria characterized by slurred speech and imprecise consonant articualtion. +flat affect, poor eye contact, distractibility, and waxing/waning alertness.

## 2022-02-02 NOTE — CONSULT NOTE ADULT - PROBLEM SELECTOR RECOMMENDATION 3
stable right now  SBP goal 120-150 as per neuro   hold home meds
R parieto-occipital lobe petechial hemorrhage and possible infarct with age indeterminate in superior R cerebellum  -Pending repeat CT head  -Per neuro.

## 2022-02-02 NOTE — SWALLOW BEDSIDE ASSESSMENT ADULT - PHARYNGEAL PHASE
Multiple swallows (x2)/Delayed pharyngeal swallow/Decreased laryngeal elevation/Throat clear post oral intake Delayed pharyngeal swallow/Decreased laryngeal elevation

## 2022-02-02 NOTE — ED PROVIDER NOTE - NSICDXPASTMEDICALHX_GEN_ALL_CORE_FT
PAST MEDICAL HISTORY:  DM (diabetes mellitus)     HLD (hyperlipidemia)     HTN (hypertension)     Hypothyroidism     TIA (transient ischemic attack)

## 2022-02-02 NOTE — ED ADULT NURSE NOTE - NSIMPLEMENTINTERV_GEN_ALL_ED
Implemented All Fall with Harm Risk Interventions:  [place] to call system. Call bell, personal items and telephone within reach. Instruct patient to call for assistance. Room bathroom lighting operational. Non-slip footwear when patient is off stretcher. Physically safe environment: no spills, clutter or unnecessary equipment. Stretcher in lowest position, wheels locked, appropriate side rails in place. Provide visual cue, wrist band, yellow gown, etc. Monitor gait and stability. Monitor for mental status changes and reorient to person, place, and time. Review medications for side effects contributing to fall risk. Reinforce activity limits and safety measures with patient and family. Provide visual clues: red socks.

## 2022-02-02 NOTE — H&P ADULT - ATTENDING COMMENTS
code stroke called in ED and neurology emergently assessed patient. Briefly   89yo RH F with HTN, HLD, ?TIA not on ac/ap agents, who presents to ED for change in mental status, slumping over, L facial droop, dysarthria and possible L hemiparesis.      LKW: 11 AM 2/1/22  NIHSS:  16  Baseline MRS: 3   Not a tPA candidate due to outside window and hemorrhage noted on CT  Not a thrombectomy candidate since no brigitte LVO.     CT head: Gyriform hyperdensity in the right parietal and occipital lobes with surrounding cytotoxic edema. No hydrocephalus. No central herniation. Basilar cisterns are clear. Scattered moderate chronic microvascular ischemic change. Unable to obtain   CTH/A   1. Progression of cytotoxic edema and petechial hemorrhage in the right parieto-occipital infarct.  2. Age-indeterminate ischemia/infarct in the superior right cerebellum.  CTA BRAIN: Patent intracranial circulation. No large vessel occlusion or significant stenosis.  CTA NECK: Patent cervical vasculature. Possible severe stenosis at the origin of the right vertebral artery versus artifact from regional motion.  +COVID    Impression: R PO occipital infarct new, chornic appearing R superior cerebellar infart. ESUS, may be 2/2 covid hypercoag     o/e today with L facial, LUE drift, and confusion   Recommendations:    - hold AC/AP, dvt ppx okay. MRI to decide on ASA   - in COVID ICU for frequent neuro checks.  - MRI brain w/ and w/o  - check APLS labs, dimers and inflammaotry markers  - rEEG  - ESR, CRP  - blood cx   - High dose statin therapy - atorvastatin 40mg PO daily. LDL goal <70mg/dL.  - Hemoglobin A1c and lipid panel  - TTE  - can come out of COVID ICU from stroke standpoint pending repeat imaging. can change neuro checks to Q4 if repeat imaging stable   - puomo eval, medicine and cardio evals   - telemetry  - PT/OT/SS/SLP, OOBC  - permissive HTN, -170mmHg.  - check FS, glucose control <180  - GI/DVT ppx  - Counseling on diet, exercise, and medication adherence was done  - Counseling on smoking cessation and alcohol consumption offered when appropriate.  - Pain assessed and judicious use of narcotics when appropriate was discussed.    - Stroke education given when appropriate.  - Importance of fall prevention discussed.   - Differential diagnosis and plan of care discussed with patient and/or family and primary team  - Thank you for allowing me to participate in the care of this patient. Call with questions.   Arnol Zacarias MD  Vascular Neurology  Office: [phone]

## 2022-02-02 NOTE — SWALLOW BEDSIDE ASSESSMENT ADULT - SLP PERTINENT HISTORY OF CURRENT PROBLEM
89 y/o RH F with PMH of HTN, HLD, ?TIA not on ac/ap agents, who presents to ED for change in mental status, slumping over, L facial droop, and possible L hemiparesis. Patient last seen by accompanying family member at 11 AM 2/1/22 with mild confusion. Patient went to sleep and woke up in evening of 2/1/22 asking for help. Was found slumped over, slurred speech and L facial droop. EMS called. At baseline, able to feed herself, use bathroom herself. Ambulates with walker. Needs assistance with other ADLs. NIHSS: 16. preMRS: 3. ICH: 2. CTH revealed gyriform hyperdensity in the right parietal and occipital lobes w/ surrounding cytotoxic edema. Scattered moderate chronic microvascular ischemic change. Unable to obtain CTA/P during code stroke as patient started drooling and spitting up in CT scan and had episode of desaturation. Patient removed from CT scan and stabilized. Not a tPA candidate 2/2 outside window and hemorrhage noted on CT. Not a thrombectomy candidate since no brigitte LVO.

## 2022-02-02 NOTE — OCCUPATIONAL THERAPY INITIAL EVALUATION ADULT - COGNITIVE, VISUAL PERCEPTUAL, OT EVAL
Pt will be A+O x 2 (Self and place) within 4 weeks, with 1-2 cues as needed. Pt will follow 100% simple directions within 4 weeks, with 1 cues as needed

## 2022-02-02 NOTE — H&P ADULT - NSHPPHYSICALEXAM_GEN_ALL_CORE
General:  Constitutional: Female, appears stated age,lethargic, requires loud verbal stimuli or severe noxious stimuli to open eyes and briefly follow simple commands  Head: Normocephalic; Eyes: clear sclera; Neck: no rigidity   Extremities: No cyanosis; Skin: No brigitte edema of LE  Resp: breathing comfortably on nasal cannula  Spitting up in CT scan. Mild emesis    Neurological (>12):  MS: Awake, intermittently alert, cannot assess orientation. Only followed 2-3 commands and then stopped.    Language: Speech is minimal to mute, hypophonic, fragmented.      CNs: pupils equal round and minimally reactive to light bilaterally (?cataract surg hx) (R = 3mm, L = 3mm). Not blinking to BTT of both eyes. L gaze preference but with effort can cross midline. Mild R gaze palsy. L facial droop.     Motor: N noticeable tremor or seizure. Limited as not following commands              Deltoid	Biceps	Triceps	   R	5	-	-	4+		 	  L	4+	5	-	4	keeps flexed in elbow and wrist		  	H-Flex	K-Ext	D-Flex	P-Flex  R	4+	-	-	-			 	   L	3	-	-	-		     Sensation: Possibly reduced on L side given greater noxious stimuli needed to elicit facial grimmacing on that side. Patient neglects using L side until prompted several times.   Reflexes:              Biceps(C5)       BR(C6)     Triceps(C7)               Patellar(L4)        Plantar Resp  R	1	          1	             1		        0		    	Down   L	2	          1	             1		        0		 	unclear    Coordination: not following for assessment  Gait: medically unstable to assess

## 2022-02-02 NOTE — SWALLOW BEDSIDE ASSESSMENT ADULT - SWALLOW EVAL: RECOMMENDED FEEDING/EATING TECHNIQUES
ONLY FEED WHEN FULLY AWAKE/ALERT; slow rate/crush medication (when feasible)/oral hygiene/position upright (90 degrees)/small sips/bites

## 2022-02-02 NOTE — SWALLOW BEDSIDE ASSESSMENT ADULT - ADDITIONAL RECOMMENDATIONS
Statement Selected GOALS:  1. Pt/family/caregiver will demonstrate understanding and carryover of dysphagia management (safe swallow guidelines, compensatory strategies, dysphagia diet).  2. Pt will tolerate recommended diet with no overt, clinical s/s of aspiration.

## 2022-02-03 DIAGNOSIS — D47.2 MONOCLONAL GAMMOPATHY: ICD-10-CM

## 2022-02-03 LAB
A1C WITH ESTIMATED AVERAGE GLUCOSE RESULT: 7.5 % — HIGH (ref 4–5.6)
ANION GAP SERPL CALC-SCNC: 10 MMOL/L — SIGNIFICANT CHANGE UP (ref 5–17)
ANION GAP SERPL CALC-SCNC: 10 MMOL/L — SIGNIFICANT CHANGE UP (ref 5–17)
BUN SERPL-MCNC: 14 MG/DL — SIGNIFICANT CHANGE UP (ref 7–23)
BUN SERPL-MCNC: 15 MG/DL — SIGNIFICANT CHANGE UP (ref 7–23)
CALCIUM SERPL-MCNC: 9.7 MG/DL — SIGNIFICANT CHANGE UP (ref 8.4–10.5)
CALCIUM SERPL-MCNC: 9.8 MG/DL — SIGNIFICANT CHANGE UP (ref 8.4–10.5)
CHLORIDE SERPL-SCNC: 100 MMOL/L — SIGNIFICANT CHANGE UP (ref 96–108)
CHLORIDE SERPL-SCNC: 101 MMOL/L — SIGNIFICANT CHANGE UP (ref 96–108)
CHOLEST SERPL-MCNC: 103 MG/DL — SIGNIFICANT CHANGE UP
CO2 SERPL-SCNC: 26 MMOL/L — SIGNIFICANT CHANGE UP (ref 22–31)
CO2 SERPL-SCNC: 26 MMOL/L — SIGNIFICANT CHANGE UP (ref 22–31)
CREAT SERPL-MCNC: 1.02 MG/DL — SIGNIFICANT CHANGE UP (ref 0.5–1.3)
CREAT SERPL-MCNC: 1.03 MG/DL — SIGNIFICANT CHANGE UP (ref 0.5–1.3)
DRVVT SCREEN TO CONFIRM RATIO: SIGNIFICANT CHANGE UP
ESTIMATED AVERAGE GLUCOSE: 169 MG/DL — HIGH (ref 68–114)
GLUCOSE BLDC GLUCOMTR-MCNC: 140 MG/DL — HIGH (ref 70–99)
GLUCOSE BLDC GLUCOMTR-MCNC: 161 MG/DL — HIGH (ref 70–99)
GLUCOSE BLDC GLUCOMTR-MCNC: 167 MG/DL — HIGH (ref 70–99)
GLUCOSE BLDC GLUCOMTR-MCNC: 176 MG/DL — HIGH (ref 70–99)
GLUCOSE SERPL-MCNC: 171 MG/DL — HIGH (ref 70–99)
GLUCOSE SERPL-MCNC: 171 MG/DL — HIGH (ref 70–99)
HCT VFR BLD CALC: 32.6 % — LOW (ref 34.5–45)
HDLC SERPL-MCNC: 40 MG/DL — LOW
HGB BLD-MCNC: 10 G/DL — LOW (ref 11.5–15.5)
LA NT DPL PPP QL: 45 SEC — SIGNIFICANT CHANGE UP
LIPID PNL WITH DIRECT LDL SERPL: 49 MG/DL — SIGNIFICANT CHANGE UP
MCHC RBC-ENTMCNC: 25.4 PG — LOW (ref 27–34)
MCHC RBC-ENTMCNC: 30.7 GM/DL — LOW (ref 32–36)
MCV RBC AUTO: 82.7 FL — SIGNIFICANT CHANGE UP (ref 80–100)
NON HDL CHOLESTEROL: 63 MG/DL — SIGNIFICANT CHANGE UP
NORMALIZED SCT PPP-RTO: 0.96 RATIO — SIGNIFICANT CHANGE UP (ref 0–1.16)
NORMALIZED SCT PPP-RTO: SIGNIFICANT CHANGE UP
NRBC # BLD: 0 /100 WBCS — SIGNIFICANT CHANGE UP (ref 0–0)
PLATELET # BLD AUTO: 302 K/UL — SIGNIFICANT CHANGE UP (ref 150–400)
POTASSIUM SERPL-MCNC: 4.3 MMOL/L — SIGNIFICANT CHANGE UP (ref 3.5–5.3)
POTASSIUM SERPL-MCNC: 5.5 MMOL/L — HIGH (ref 3.5–5.3)
POTASSIUM SERPL-SCNC: 4.3 MMOL/L — SIGNIFICANT CHANGE UP (ref 3.5–5.3)
POTASSIUM SERPL-SCNC: 5.5 MMOL/L — HIGH (ref 3.5–5.3)
RBC # BLD: 3.94 M/UL — SIGNIFICANT CHANGE UP (ref 3.8–5.2)
RBC # FLD: 14.4 % — SIGNIFICANT CHANGE UP (ref 10.3–14.5)
SODIUM SERPL-SCNC: 136 MMOL/L — SIGNIFICANT CHANGE UP (ref 135–145)
SODIUM SERPL-SCNC: 137 MMOL/L — SIGNIFICANT CHANGE UP (ref 135–145)
TRIGL SERPL-MCNC: 73 MG/DL — SIGNIFICANT CHANGE UP
WBC # BLD: 14.79 K/UL — HIGH (ref 3.8–10.5)
WBC # FLD AUTO: 14.79 K/UL — HIGH (ref 3.8–10.5)

## 2022-02-03 PROCEDURE — 95720 EEG PHY/QHP EA INCR W/VEEG: CPT

## 2022-02-03 PROCEDURE — 74018 RADEX ABDOMEN 1 VIEW: CPT | Mod: 26

## 2022-02-03 PROCEDURE — 70450 CT HEAD/BRAIN W/O DYE: CPT | Mod: 26

## 2022-02-03 PROCEDURE — 99222 1ST HOSP IP/OBS MODERATE 55: CPT

## 2022-02-03 PROCEDURE — 71250 CT THORAX DX C-: CPT | Mod: 26

## 2022-02-03 PROCEDURE — 93970 EXTREMITY STUDY: CPT | Mod: 26

## 2022-02-03 RX ORDER — ACETAMINOPHEN 500 MG
650 TABLET ORAL EVERY 6 HOURS
Refills: 0 | Status: DISCONTINUED | OUTPATIENT
Start: 2022-02-03 | End: 2022-02-10

## 2022-02-03 RX ORDER — INSULIN GLARGINE 100 [IU]/ML
5 INJECTION, SOLUTION SUBCUTANEOUS AT BEDTIME
Refills: 0 | Status: DISCONTINUED | OUTPATIENT
Start: 2022-02-03 | End: 2022-02-10

## 2022-02-03 RX ORDER — AMLODIPINE BESYLATE 2.5 MG/1
5 TABLET ORAL DAILY
Refills: 0 | Status: DISCONTINUED | OUTPATIENT
Start: 2022-02-03 | End: 2022-02-04

## 2022-02-03 RX ORDER — ATORVASTATIN CALCIUM 80 MG/1
40 TABLET, FILM COATED ORAL AT BEDTIME
Refills: 0 | Status: DISCONTINUED | OUTPATIENT
Start: 2022-02-03 | End: 2022-02-10

## 2022-02-03 RX ADMIN — BRIMONIDINE TARTRATE 1 DROP(S): 2 SOLUTION/ DROPS OPHTHALMIC at 07:05

## 2022-02-03 RX ADMIN — ATORVASTATIN CALCIUM 40 MILLIGRAM(S): 80 TABLET, FILM COATED ORAL at 21:51

## 2022-02-03 RX ADMIN — Medication 1: at 09:06

## 2022-02-03 RX ADMIN — BRIMONIDINE TARTRATE 1 DROP(S): 2 SOLUTION/ DROPS OPHTHALMIC at 18:22

## 2022-02-03 RX ADMIN — LATANOPROST 1 DROP(S): 0.05 SOLUTION/ DROPS OPHTHALMIC; TOPICAL at 21:50

## 2022-02-03 RX ADMIN — INSULIN GLARGINE 5 UNIT(S): 100 INJECTION, SOLUTION SUBCUTANEOUS at 22:49

## 2022-02-03 RX ADMIN — Medication 88 MICROGRAM(S): at 09:06

## 2022-02-03 RX ADMIN — SODIUM CHLORIDE 75 MILLILITER(S): 9 INJECTION, SOLUTION INTRAVENOUS at 09:05

## 2022-02-03 RX ADMIN — AMLODIPINE BESYLATE 5 MILLIGRAM(S): 2.5 TABLET ORAL at 09:06

## 2022-02-03 RX ADMIN — HEPARIN SODIUM 5000 UNIT(S): 5000 INJECTION INTRAVENOUS; SUBCUTANEOUS at 18:22

## 2022-02-03 RX ADMIN — HEPARIN SODIUM 5000 UNIT(S): 5000 INJECTION INTRAVENOUS; SUBCUTANEOUS at 07:05

## 2022-02-03 RX ADMIN — Medication 1: at 12:31

## 2022-02-03 NOTE — PROGRESS NOTE ADULT - SUBJECTIVE AND OBJECTIVE BOX
Subjective: Patient seen and examined. No new events except as noted.   remains in 5ICU    REVIEW OF SYSTEMS:    CONSTITUTIONAL: + weakness, fevers or chills  EYES/ENT: No visual changes;  No vertigo or throat pain   NECK: No pain or stiffness  RESPIRATORY: No cough, wheezing, hemoptysis; No shortness of breath  CARDIOVASCULAR: No chest pain or palpitations  GASTROINTESTINAL: No abdominal or epigastric pain. No nausea, vomiting, or hematemesis; No diarrhea or constipation. No melena or hematochezia.  GENITOURINARY: No dysuria, frequency or hematuria  NEUROLOGICAL: No numbness or weakness  SKIN: No itching, burning, rashes, or lesions   All other review of systems is negative unless indicated above.    MEDICATIONS:  MEDICATIONS  (STANDING):  amLODIPine   Tablet 5 milliGRAM(s) Oral daily  atorvastatin 40 milliGRAM(s) Oral at bedtime  brimonidine 0.2% Ophthalmic Solution 1 Drop(s) Both EYES two times a day  dextrose 40% Gel 15 Gram(s) Oral once  dextrose 5%. 1000 milliLiter(s) (50 mL/Hr) IV Continuous <Continuous>  dextrose 5%. 1000 milliLiter(s) (100 mL/Hr) IV Continuous <Continuous>  dextrose 50% Injectable 25 Gram(s) IV Push once  dextrose 50% Injectable 12.5 Gram(s) IV Push once  dextrose 50% Injectable 25 Gram(s) IV Push once  glucagon  Injectable 1 milliGRAM(s) IntraMuscular once  heparin   Injectable 5000 Unit(s) SubCutaneous every 12 hours  insulin glargine Injectable (LANTUS) 5 Unit(s) SubCutaneous at bedtime  insulin lispro (ADMELOG) corrective regimen sliding scale   SubCutaneous Before meals and at bedtime  lactated ringers. 1000 milliLiter(s) (75 mL/Hr) IV Continuous <Continuous>  latanoprost 0.005% Ophthalmic Solution 1 Drop(s) Both EYES at bedtime  levothyroxine 88 MICROGram(s) Oral daily      PHYSICAL EXAM:  T(C): 36.9 (02-03-22 @ 12:00), Max: 37.1 (02-02-22 @ 20:00)  HR: 98 (02-03-22 @ 12:00) (57 - 98)  BP: 158/74 (02-03-22 @ 12:00) (118/56 - 184/74)  RR: 29 (02-03-22 @ 12:00) (14 - 34)  SpO2: 96% (02-03-22 @ 12:00) (96% - 100%)  Wt(kg): --  I&O's Summary    02 Feb 2022 07:01  -  03 Feb 2022 07:00  --------------------------------------------------------  IN: 1575 mL / OUT: 1290 mL / NET: 285 mL    03 Feb 2022 07:01  -  03 Feb 2022 13:47  --------------------------------------------------------  IN: 225 mL / OUT: 0 mL / NET: 225 mL    Appearance: NAD	  HEENT: Normal oral mucosa, PERRL, EOMI	  Lymphatic: No lymphadenopathy , no edema  Cardiovascular: Normal S1 S2, No JVD, No murmurs , Peripheral pulses palpable 2+ bilaterally  Respiratory: Lungs clear to auscultation, normal effort 	  Gastrointestinal:  Soft, Non-tender, + BS	  Skin: No rashes, No ecchymoses, No cyanosis, warm to touch  Musculoskeletal: Normal range of motion, normal strength  Ext: No edema  Mental status: Awake, alert, oriented x age and name but with fluctuations, follows some simple commands and generates some short phrases   Cranial Nerves: left facial palsy, no nystagmus, no dysarthria,  tongue midline  Motor exam: Normal tone, no drift, moves all extremities against gravity   Sensation: Intact to light touch   Coordination/ Gait: gait not assessed     LABS:    CARDIAC MARKERS:  CARDIAC MARKERS ( 02 Feb 2022 00:37 )  x     / x     / 65 U/L / x     / x                     10.0   14.79 )-----------( 302      ( 03 Feb 2022 06:49 )             32.6     02-03    136  |  100  |  14  ----------------------------<  171<H>  4.3   |  26  |  1.02    Ca    9.8      03 Feb 2022 10:10  Phos  3.8     02-02  Mg     1.9     02-02    TPro  9.2<H>  /  Alb  4.2  /  TBili  0.4  /  DBili  x   /  AST  17  /  ALT  23  /  AlkPhos  205<H>  02-02    proBNP:   Lipid Profile:   HgA1c:   TSH:     TELEMETRY: afib     ECG:  	  RADIOLOGY:   DIAGNOSTIC TESTING:  [ ] Echocardiogram:  [ ]  Catheterization:  [ ] Stress Test:    OTHER:

## 2022-02-03 NOTE — PROGRESS NOTE ADULT - ASSESSMENT
ASSESSMENT:    89yo RH F PMHx HTN, HLD, ?TIA not on ac/ap agents, who presents to ED for change in mental status, slumping over, L facial droop, dysarthria and possible L hemiparesis. Vs 97.7F, HR 70, 136/61, RR18, 100%RA. CK wnl. LKW: 11 AM 2/1/22  NIHSS:  16 Baseline MRS: 3  Not a tPA candidate due to outside window and hemorrhage noted on CTNot a thrombectomy candidate since no brigitte LVO. CT head: Gyriform hyperdensity in the right parietal and occipital lobes with surrounding cytotoxic edema. No hydrocephalus. No central herniation. Basilar cisterns are clear. Scattered moderate chronic microvascular ischemic change. Unable to obtain CTA/P during code stroke as patient started drooling and spitting up in CT scan and had episode of desaturation. Patient removed from CT scan and stabilized in trauma room by ED staff.   CTH/A repeat 1. Progression of cytotoxic edema and petechial hemorrhage in the right parieto-occipital infarct.2. Age-indeterminate ischemia/infarct in the superior right cerebellum.CTA BRAIN: Patent intracranial circulation. No large vessel occlusion or significant stenosis.CTA NECK: Patent cervical vasculature. Possible severe stenosis at the origin of the right vertebral artery versus artifact from regional motion.    Impression:  R parieto-occipital lobe petechial hemorrhage and possible infarct with age indeterminate infarct in superior R cerebellum. Possible severe stenosis at the origin of the right vertebral artery versus artifact from regional motion.  Mechanism:  Embolic stroke of undetermined source.    NEURO: neurologically without acute change, continue close monitoring for neurologic deterioration, permissive HTN < 160mmHg (goal 120-160mmHg) in setting of petechial hemorrhage, avoid hypotension and rapid fluctuations , titrate statin to LDL goal less than 70, MR if able to tolerate. Physical therapy/OT: Rehab     ANTITHROMBOTIC THERAPY: ASA pending clinical and radiological stability. On hold at this time given hemorrhagic transformation as noted.     PULMONARY: pulmonary follow up appreciated: CXR grossly clear, monitor for hypoxia, trend inflammatory markers, check CT chest, LE duplex  protecting airway, saturating well     CARDIOVASCULAR: check TTE, cardiac monitoring to ensure no atrial fibrillation,                               SBP goal: 120-160mmhg     GASTROINTESTINAL:  SLP eval for    Puree diet and Mildly Thick Liquids  Will defer objective evaluation at this time. Will f/u for assessment of diet tolerance and ?diet upgrade at bedside vs. objective evaluation of swallow.  crush medication (when feasible); oral hygiene; position upright (90 degrees); small sips/bites; ONLY FEED WHEN FULLY AWAKE/ALERT; slow rate  dependent       RENAL: BUN/Cr without acuate change, maintain adequate hydration, monitor with repeat BMP hyperkalemia- no associated sx reported, good urine output      Na Goal: Greater than 135     Amin: n     HEMATOLOGY: H/H with anemia but no acute change, Platelets 302, Hematology follow up appreciated: MGUS- follows at [facility]     DVT ppx: Heparin s.c [x] LMWH []     ID: afebrile,  leukocytosis , CT chest pending, UA neg , monitor for si/sx of infection     DISPOSITION: Rehab       CORE MEASURES:        Admission NIHSS: 16     TPA: [] YES [x] NO      LDL/HDL:p     Depression Screen: p     Statin Therapy: y      Dysphagia Screen: [x] PASS [] FAIL     Smoking [] YES x[] NO      Afib [] YES [x] NO     Stroke Education [x] YES [] NO    Obtain screening lower extremity venous ultrasound in patients who meet 1 or more of the following criteria as patient is high risk for DVT/PE on admission:   [] History of DVT/PE  [x]Hypercoagulable states (Factor V Leiden, Cancer, OCP, etc. )  [x]Prolonged immobility (hemiplegia/hemiparesis/post operative or any other extended immobilization)  [] Transferred from outside facility (Rehab or Long term care)  [] Age </= to 50 ASSESSMENT:    89yo RH F PMHx HTN, HLD, ?TIA not on ac/ap agents, who presents to ED for change in mental status, slumping over, L facial droop, dysarthria and possible L hemiparesis. Vs 97.7F, HR 70, 136/61, RR18, 100%RA. CK wnl. LKW: 11 AM 2/1/22  NIHSS:  16 Baseline MRS: 3  Not a tPA candidate due to outside window and hemorrhage noted on CTNot a thrombectomy candidate since no brigitte LVO. CT head: Gyriform hyperdensity in the right parietal and occipital lobes with surrounding cytotoxic edema. No hydrocephalus. No central herniation. Basilar cisterns are clear. Scattered moderate chronic microvascular ischemic change. Unable to obtain CTA/P during code stroke as patient started drooling and spitting up in CT scan and had episode of desaturation. Patient removed from CT scan and stabilized in trauma room by ED staff.   CTH/A repeat 1. Progression of cytotoxic edema and petechial hemorrhage in the right parieto-occipital infarct.2. Age-indeterminate ischemia/infarct in the superior right cerebellum.CTA BRAIN: Patent intracranial circulation. No large vessel occlusion or significant stenosis.CTA NECK: Patent cervical vasculature. Possible severe stenosis at the origin of the right vertebral artery versus artifact from regional motion.    Impression:  R parieto-occipital lobe petechial hemorrhage and possible infarct with age indeterminate infarct in superior R cerebellum. Possible severe stenosis at the origin of the right vertebral artery versus artifact from regional motion.  Mechanism:  Embolic stroke of undetermined source.    NEURO: neurologically without acute change, continue close monitoring for neurologic deterioration given cerebral edema with mass effect and brain compression, avoid hyponatremia, permissive HTN < 160mmHg (goal 120-160mmHg) in setting of petechial hemorrhage, avoid hypotension and rapid fluctuations , titrate statin to LDL goal less than 70, MR if able to tolerate. Physical therapy/OT: Rehab     ANTITHROMBOTIC THERAPY: ASA pending clinical and radiological stability. On hold at this time given hemorrhagic transformation as noted.     PULMONARY: pulmonary follow up appreciated: CXR grossly clear, monitor for hypoxia, trend inflammatory markers, check CT chest, LE duplex  protecting airway, saturating well     CARDIOVASCULAR: check TTE, cardiac monitoring to ensure no atrial fibrillation,                               SBP goal: 120-160mmhg     GASTROINTESTINAL:  SLP eval for    Puree diet and Mildly Thick Liquids  Will defer objective evaluation at this time. Will f/u for assessment of diet tolerance and ?diet upgrade at bedside vs. objective evaluation of swallow.  crush medication (when feasible); oral hygiene; position upright (90 degrees); small sips/bites; ONLY FEED WHEN FULLY AWAKE/ALERT; slow rate  dependent       RENAL: BUN/Cr without acuate change, maintain adequate hydration, monitor with repeat BMP hyperkalemia- no associated sx reported, good urine output      Na Goal: Greater than 135     Amin: n     HEMATOLOGY: H/H with anemia but no acute change, Platelets 302, Hematology follow up appreciated: MGUS- [facility]     DVT ppx: Heparin s.c [x] LMWH []     ID: afebrile,  leukocytosis , CT chest pending, UA neg , monitor for si/sx of infection     DISPOSITION: Rehab       CORE MEASURES:        Admission NIHSS: 16     TPA: [] YES [x] NO      LDL/HDL:p     Depression Screen: p     Statin Therapy: y      Dysphagia Screen: [x] PASS [] FAIL     Smoking [] YES x[] NO      Afib [] YES [x] NO     Stroke Education [x] YES [] NO    Obtain screening lower extremity venous ultrasound in patients who meet 1 or more of the following criteria as patient is high risk for DVT/PE on admission:   [] History of DVT/PE  [x]Hypercoagulable states (Factor V Leiden, Cancer, OCP, etc. )  [x]Prolonged immobility (hemiplegia/hemiparesis/post operative or any other extended immobilization)  [] Transferred from outside facility (Rehab or Long term care)  [] Age </= to 50

## 2022-02-03 NOTE — CONSULT NOTE ADULT - ASSESSMENT
Hematology/Oncology consulted on this 89 year old female with past medical history of HTN, HLD and TIA known to [facility] and follows with Dr Batista for MGUS on 10/6/21 with new complaints of a 23 pound weight loss. She presented to [facility] ED with complaints of difficulty standing, leaning towards her left side and with a left facial droop.   CT head revealed a  R parieto-occipital lobe petechial hemorrhage and possible infarct with age indeterminate infarct in superior R cerebellum. Possible severe stenosis at the origin of the right vertebral artery versus artifact from regional motion. During presentation she was found to be COVID positive.    MGUS  --Follows with Dr Batista at [facility]  --Last visit 10/6/21    CVA  --CT head  Progression of petechial hemorrhage and cytotoxic edema in the right   parieto-occipital infarct. Irregular hypodensity in the superior right   cerebellum, age indeterminate. No hydrocephalus. No central herniation.   Basal cisterns are clear.  --MRI head pending  --Lupus w/u pending    Sepsis  --Blood cultures pending  --Urine negative    Covid  --Afebrile  --02 sat 99%, NC 2L  --D dimer 728  --Isolation precautions maintained  --CT chest pending to rule out PNA  --Duplex ordered  --pulmonary on board    Patient will follow with Dr Batista at [facility] after discharge  Thank you for allowing us to participate in Mrs Lila covarrubias    Cailin Benavides NP  Hematology/Oncology  [facility]  [phone] (Cell)  [phone] (Office)  [phone] (Alt office)  Evenings and weekends please call MD on call or office       Hematology/Oncology consulted on this 89 year old female with past medical history of HTN, HLD and TIA known to [facility] and follows with Dr Batista for MGUS on 10/6/21 with new complaints of a 23 pound weight loss. She presented to [facility] ED with complaints of difficulty standing, leaning towards her left side and with a left facial droop.   CT head revealed a  R parieto-occipital lobe petechial hemorrhage and possible infarct with age indeterminate infarct in superior R cerebellum. Possible severe stenosis at the origin of the right vertebral artery versus artifact from regional motion. During presentation she was found to be COVID positive.    MGUS  --Follows with Dr Batista at [facility]  --Last visit 10/6/21    CVA  --CT head  Progression of petechial hemorrhage and cytotoxic edema in the right   parieto-occipital infarct. Irregular hypodensity in the superior right   cerebellum, age indeterminate. No hydrocephalus. No central herniation.   Basal cisterns are clear.  --MRI head pending  --Lupus w/u pending  --Neurology following    Sepsis  --Blood cultures pending  --Urine negative    Covid  --Afebrile  --02 sat 99%, NC 2L  --D dimer 728  --Isolation precautions maintained  --CT chest pending to rule out PNA  --Duplex ordered  --pulmonary on board    Patient will follow with Dr Batista at [facility] after discharge  Thank you for allowing us to participate in Mrs Lila covarrubias    Cailin Benavides NP  Hematology/Oncology  [facility]  [phone] (Cell)  [phone] (Office)  [phone] (Alt office)  Evenings and weekends please call MD on call or office

## 2022-02-03 NOTE — CONSULT NOTE ADULT - CONSULT REQUESTED DATE/TIME
02-Feb-2022 02:14
03-Feb-2022 06:51
03-Feb-2022 10:34
02-Feb-2022 09:35
02-Feb-2022 14:36
02-Feb-2022 15:43

## 2022-02-03 NOTE — PROGRESS NOTE ADULT - PROBLEM SELECTOR PLAN 1
COVID PCR + 2/2  -CXR grossly clear  -No recent COVID infection per family, vaccinated x2 doses, did not receive booster   -Reportedly with hypoxia to low 90s on admission, O2 sats 96% on RA when seen   -Would monitor off treatment for now. If O2 requirements increase will consider Dexamethasone  -Mild hypoxia likely 2nd to atelectasis rather than COVID PNA   -Trend inflammatory markers  -DVT ppx  -F/u LE duplex  -Check CT chest to r/o PNA.

## 2022-02-03 NOTE — CONSULT NOTE ADULT - SUBJECTIVE AND OBJECTIVE BOX
Reason for consult: facial droop, Rule out stroke    HPI:  HPI: Patient is a 89yo RH F PMHx HTN, HLD, ?TIA not on ac/ap agents, who presents to ED for change in mental status, slumping over, L facial droop, and possible L hemiparesis. Patient last seen by accompanying family member at 11 AM 2/1/22 with mild confusion. Patient went to sleep and woke up in evening of 2/1/22 asking for help. Was found slumped over, slurred speech and L facial droop. EMS called for concern of stroke. At baseline, able to feed herself, use bathroom herself. Ambulates with walker. Needs assistance with other ADLs.     Hematology/Oncology consulted on this 89 year old female with past medical history of HTN, HLD and TIA known to [facility] and follows with Dr Batista for [facility] [place] [facility] on 10/6/21 with new complaints of a 23 pound weight loss. She presented to [facility] ED with complaints of difficulty standing, leaning towards her left side and with a left facial droop.   CT head revealed a  R parieto-occipital lobe petechial hemorrhage and possible infarct with age indeterminate infarct in superior R cerebellum. Possible severe stenosis at the origin of the right vertebral artery versus artifact from regional motion.      PAST MEDICAL & SURGICAL HISTORY:  HTN (hypertension)    HLD (hyperlipidemia)    DM (diabetes mellitus)    Hypothyroidism    TIA (transient ischemic attack)    S/P cholecystectomy        FAMILY HISTORY:      Alcohol Denied  Smoking: Nonsmoker  Drug Use: Denied  Marital Status:         Allergies    No Known Allergies    Intolerances        MEDICATIONS  (STANDING):  brimonidine 0.2% Ophthalmic Solution 1 Drop(s) Both EYES two times a day  dextrose 40% Gel 15 Gram(s) Oral once  dextrose 5%. 1000 milliLiter(s) (50 mL/Hr) IV Continuous <Continuous>  dextrose 5%. 1000 milliLiter(s) (100 mL/Hr) IV Continuous <Continuous>  dextrose 50% Injectable 25 Gram(s) IV Push once  dextrose 50% Injectable 12.5 Gram(s) IV Push once  dextrose 50% Injectable 25 Gram(s) IV Push once  glucagon  Injectable 1 milliGRAM(s) IntraMuscular once  heparin   Injectable 5000 Unit(s) SubCutaneous every 12 hours  insulin lispro (ADMELOG) corrective regimen sliding scale   SubCutaneous Before meals and at bedtime  lactated ringers. 1000 milliLiter(s) (75 mL/Hr) IV Continuous <Continuous>  latanoprost 0.005% Ophthalmic Solution 1 Drop(s) Both EYES at bedtime  levothyroxine 88 MICROGram(s) Oral daily    MEDICATIONS  (PRN):      ROS  Lethargic  No epistaxis, HA, sore throat  No CP, SOB, cough, sputum  No n/v/d, abd pain, melena, hematochezia  No edema  No rash  No anxiety      T(C): 36.9 (02-03-22 @ 04:00), Max: 37.1 (02-02-22 @ 20:00)  HR: 72 (02-03-22 @ 06:00) (57 - 91)  BP: 175/77 (02-03-22 @ 06:00) (118/56 - 183/74)  RR: 14 (02-03-22 @ 06:00) (14 - 34)  SpO2: 99% (02-03-22 @ 06:00) (98% - 100%)  Wt(kg): --    PE  NAD  Awake, alert  Anicteric, MMM  RRR  CTAB  Abd soft, NT, ND  No c/c/e  No rash grossly                            9.9    10.70 )-----------( 364      ( 02 Feb 2022 10:52 )             31.4       02-02    135  |  98  |  15  ----------------------------<  293<H>  4.9   |  24  |  1.01    Ca    9.8      02 Feb 2022 10:54  Phos  3.8     02-02  Mg     1.9     02-02    TPro  9.2<H>  /  Alb  4.2  /  TBili  0.4  /  DBili  x   /  AST  17  /  ALT  23  /  AlkPhos  205<H>  02-02    ACC: [id number] EXAM:  CT ANGIO NECK (W)AW IC                        ACC: [id number] EXAM:  CT BRAIN                        ACC: [id number] EXAM:  CT ANGIO BRAIN (W)AW IC                          PROCEDURE DATE:  02/02/2022          INTERPRETATION:  HISTORY: Code stroke, hemorrhage follow-up    COMPARISON: CT from earlier the same date    TECHNIQUE: Noncontrast CT head and CT angiogram of the neck and brain was   performed after administration of intravenous contrast. MIP and 3D   reconstructions were performed.    Total of 70 cc Omnipaque 300 intravenous contrast were administered   without complication.    FINDINGS:    CT HEAD:    Progression of petechial hemorrhage and cytotoxic edema in the right   parieto-occipital infarct. Irregular hypodensity in the superior right   cerebellum, age indeterminate. No hydrocephalus. No central herniation.   Basal cisterns are clear.      CTA BRAIN    INTERNAL CAROTID ARTERIES:  The intracranial segments of the ICA are   patent without hemodynamically significant stenosis, occlusion, or   aneurysm. The ICA bifurcations are unremarkable.    ANTERIOR CEREBRAL ARTERIES: No flow-limiting stenosis or occlusion.   Anterior communicating artery is unremarkable without aneurysm.    MIDDLE CEREBRAL ARTERIES: No flow-limiting stenosis or occlusion. MCA   bifurcations are unremarkable without aneurysm.    POSTERIOR CEREBRAL ARTERIES: No flow-limiting stenosis or occlusion.   Fetal origin of the left PCA. Well-developed right posterior   communicating artery..    VERTEBROBASILAR SYSTEM: No flow-limiting stenosis or occlusion. Basilar   tip is unremarkable.    CTA NECK    RIGHT CAROTID SYSTEM: Normal in course and caliber without flow-limiting   stenosis or occlusion.    LEFT CAROTID SYSTEM: Normal in course and caliber without flow-limiting   stenosis or occlusion.    VERTEBRAL SYSTEM:  Normal in course and caliber  without flow-limiting   stenosis or occlusion. Origin of the right vertebral artery appears   markedly stenotic, however evaluation is degraded by regional motion   artifact. The right superior cerebellar artery is patent but attenuated   compared to the left.    AORTIC ARCH: Bovine arch branching variant. Origin of the great arteries   is patent.    Miscellaneous: Mediastinal adenopathy.    IMPRESSION:    CT HEAD:  1. Progression of cytotoxic edema and petechial hemorrhage in the right   parieto-occipital infarct.  2. Age-indeterminate ischemia/infarct in the superior right cerebellum.    CTA BRAIN:  1. Patent intracranial circulation. No large vessel occlusion or   significant stenosis..    CTA NECK:  1. Patent cervical vasculature. Possible severe stenosis at the origin of   the right vertebral artery versus artifact from regional motion.    Discussed with Dr. Monaco on 2/2/2022 at 4:06 AM.    --- End of Report ---      ACC: [id number] EXAM:  CT ANGIO NECK (W)AW IC                        ACC: [id number] EXAM:  CT BRAIN                        ACC: [id number] EXAM:  CT ANGIO BRAIN (W)AW IC                          PROCEDURE DATE:  02/02/2022          INTERPRETATION:  HISTORY: Code stroke, hemorrhage follow-up    COMPARISON: CT from earlier the same date    TECHNIQUE: Noncontrast CT head and CT angiogram of the neck and brain was   performed after administration of intravenous contrast. MIP and 3D   reconstructions were performed.    Total of 70 cc Omnipaque 300 intravenous contrast were administered   without complication.    FINDINGS:    CT HEAD:    Progression of petechial hemorrhage and cytotoxic edema in the right   parieto-occipital infarct. Irregular hypodensity in the superior right   cerebellum, age indeterminate. No hydrocephalus. No central herniation.   Basal cisterns are clear.      CTA BRAIN    INTERNAL CAROTID ARTERIES:  The intracranial segments of the ICA are   patent without hemodynamically significant stenosis, occlusion, or   aneurysm. The ICA bifurcations are unremarkable.    ANTERIOR CEREBRAL ARTERIES: No flow-limiting stenosis or occlusion.   Anterior communicating artery is unremarkable without aneurysm.    MIDDLE CEREBRAL ARTERIES: No flow-limiting stenosis or occlusion. MCA   bifurcations are unremarkable without aneurysm.    POSTERIOR CEREBRAL ARTERIES: No flow-limiting stenosis or occlusion.   Fetal origin of the left PCA. Well-developed right posterior   communicating artery..    VERTEBROBASILAR SYSTEM: No flow-limiting stenosis or occlusion. Basilar   tip is unremarkable.    CTA NECK    RIGHT CAROTID SYSTEM: Normal in course and caliber without flow-limiting   stenosis or occlusion.    LEFT CAROTID SYSTEM: Normal in course and caliber without flow-limiting   stenosis or occlusion.    VERTEBRAL SYSTEM:  Normal in course and caliber  without flow-limiting   stenosis or occlusion. Origin of the right vertebral artery appears   markedly stenotic, however evaluation is degraded by regional motion   artifact. The right superior cerebellar artery is patent but attenuated   compared to the left.    AORTIC ARCH: Bovine arch branching variant. Origin of the great arteries   is patent.    Miscellaneous: Mediastinal adenopathy.    IMPRESSION:    CT HEAD:  1. Progression of cytotoxic edema and petechial hemorrhage in the right   parieto-occipital infarct.  2. Age-indeterminate ischemia/infarct in the superior right cerebellum.    CTA BRAIN:  1. Patent intracranial circulation. No large vessel occlusion or   significant stenosis..    CTA NECK:  1. Patent cervical vasculature. Possible severe stenosis at the origin of   the right vertebral artery versus artifact from regional motion.    Discussed with Dr. Monaco on 2/2/2022 at 4:06 AM.    --- End of Report ---    ACC: [id number] EXAM:  CT ANGIO NECK (W)AW IC                        ACC: [id number] EXAM:  CT BRAIN                        ACC: [id number] EXAM:  CT ANGIO BRAIN (W)AW IC                          PROCEDURE DATE:  02/02/2022          INTERPRETATION:  HISTORY: Code stroke, hemorrhage follow-up    COMPARISON: CT from earlier the same date    TECHNIQUE: Noncontrast CT head and CT angiogram of the neck and brain was   performed after administration of intravenous contrast. MIP and 3D   reconstructions were performed.    Total of 70 cc Omnipaque 300 intravenous contrast were administered   without complication.    FINDINGS:    CT HEAD:    Progression of petechial hemorrhage and cytotoxic edema in the right   parieto-occipital infarct. Irregular hypodensity in the superior right   cerebellum, age indeterminate. No hydrocephalus. No central herniation.   Basal cisterns are clear.      CTA BRAIN    INTERNAL CAROTID ARTERIES:  The intracranial segments of the ICA are   patent without hemodynamically significant stenosis, occlusion, or   aneurysm. The ICA bifurcations are unremarkable.    ANTERIOR CEREBRAL ARTERIES: No flow-limiting stenosis or occlusion.   Anterior communicating artery is unremarkable without aneurysm.    MIDDLE CEREBRAL ARTERIES: No flow-limiting stenosis or occlusion. MCA   bifurcations are unremarkable without aneurysm.    POSTERIOR CEREBRAL ARTERIES: No flow-limiting stenosis or occlusion.   Fetal origin of the left PCA. Well-developed right posterior   communicating artery..    VERTEBROBASILAR SYSTEM: No flow-limiting stenosis or occlusion. Basilar   tip is unremarkable.    CTA NECK    RIGHT CAROTID SYSTEM: Normal in course and caliber without flow-limiting   stenosis or occlusion.    LEFT CAROTID SYSTEM: Normal in course and caliber without flow-limiting   stenosis or occlusion.    VERTEBRAL SYSTEM:  Normal in course and caliber  without flow-limiting   stenosis or occlusion. Origin of the right vertebral artery appears   markedly stenotic, however evaluation is degraded by regional motion   artifact. The right superior cerebellar artery is patent but attenuated   compared to the left.    AORTIC ARCH: Bovine arch branching variant. Origin of the great arteries   is patent.    Miscellaneous: Mediastinal adenopathy.    IMPRESSION:    CT HEAD:  1. Progression of cytotoxic edema and petechial hemorrhage in the right   parieto-occipital infarct.  2. Age-indeterminate ischemia/infarct in the superior right cerebellum.    CTA BRAIN:  1. Patent intracranial circulation. No large vessel occlusion or   significant stenosis..    CTA NECK:  1. Patent cervical vasculature. Possible severe stenosis at the origin of   the right vertebral artery versus artifact from regional motion.    Discussed with Dr. Monaco on 2/2/2022 at 4:06 AM.    --- End of Report ---

## 2022-02-03 NOTE — PROGRESS NOTE ADULT - ASSESSMENT
Patient is a 89yo RH F PMHx HTN, HLD, ?TIA not on ac/ap agents, who presents to ED for change in mental status, slumping over, L facial droop, dysarthria and possible L hemiparesis.    CVA  - workup as per neurology    COVID 19 pna  - pt is not hypoxic  - keep O2 saturation above 92%  - follow informatory markers  - check ct chest  - pulm is following    elevated d dimer  - mason lower ext doppler negative for dvt    dysphagia  - swallow eval  - Puree diet and Mildly Thick Liquids    dvt px  - sq heparin

## 2022-02-03 NOTE — PROGRESS NOTE ADULT - SUBJECTIVE AND OBJECTIVE BOX
THE PATIENT WAS SEEN AND EXAMINED BY ME WITH THE HOUSESTAFF AND STROKE TEAM DURING MORNING ROUNDS.      HPI: Patient is a 89yo RH F PMHx HTN, HLD, ?TIA not on ac/ap agents, who presented to ED for change in mental status, slumping over, L facial droop, and possible L hemiparesis. Patient last seen by accompanying family member at 11 AM 2/1/22 with mild confusion. Patient went to sleep and woke up in evening of 2/1/22 asking for help. Was found slumped over, slurred speech and L facial droop. EMS called for concern of stroke. At baseline, able to feed herself, use bathroom herself. Ambulates with walker. Needs assistance with other ADLs.   Patient unable to provide ROS.  NIHSS: 16  preMRS: 3   ICH: 2      SUBJECTIVE: No events overnight.  No new neurologic complaints.  ROS reported negative unless otherwise noted.    amLODIPine   Tablet 5 milliGRAM(s) Oral daily  atorvastatin 40 milliGRAM(s) Oral at bedtime  brimonidine 0.2% Ophthalmic Solution 1 Drop(s) Both EYES two times a day  dextrose 40% Gel 15 Gram(s) Oral once  dextrose 5%. 1000 milliLiter(s) IV Continuous <Continuous>  dextrose 5%. 1000 milliLiter(s) IV Continuous <Continuous>  dextrose 50% Injectable 25 Gram(s) IV Push once  dextrose 50% Injectable 12.5 Gram(s) IV Push once  dextrose 50% Injectable 25 Gram(s) IV Push once  glucagon  Injectable 1 milliGRAM(s) IntraMuscular once  heparin   Injectable 5000 Unit(s) SubCutaneous every 12 hours  insulin glargine Injectable (LANTUS) 5 Unit(s) SubCutaneous at bedtime  insulin lispro (ADMELOG) corrective regimen sliding scale   SubCutaneous Before meals and at bedtime  lactated ringers. 1000 milliLiter(s) IV Continuous <Continuous>  latanoprost 0.005% Ophthalmic Solution 1 Drop(s) Both EYES at bedtime  levothyroxine 88 MICROGram(s) Oral daily      PHYSICAL EXAM:   Vital Signs Last 24 Hrs  T(C): 36.9 (03 Feb 2022 04:00), Max: 37.1 (02 Feb 2022 20:00)  T(F): 98.4 (03 Feb 2022 04:00), Max: 98.7 (02 Feb 2022 20:00)  HR: 86 (03 Feb 2022 08:00) (57 - 91)  BP: 184/74 (03 Feb 2022 08:00) (118/56 - 184/74)  BP(mean): 106 (03 Feb 2022 08:00) (81 - 142)  RR: 17 (03 Feb 2022 08:00) (14 - 34)  SpO2: 100% (03 Feb 2022 08:00) (98% - 100%)    General: No acute distress  HEENT: EOM intact, visual fields full  Abdomen: Soft, nontender, nondistended   Extremities: No edema    NEUROLOGICAL EXAM:  Mental status: Awake, alert, oriented x age and name but with fluctuations, follows some simple commands and generates some short phrases   Cranial Nerves: left facial palsy, no nystagmus, no dysarthria,  tongue midline  Motor exam: Normal tone, no drift, moves all extremities against gravity   Sensation: Intact to light touch   Coordination/ Gait: gait not assessed   LABS:                        10.0   14.79 )-----------( 302      ( 03 Feb 2022 06:49 )             32.6    02-03    137  |  101  |  15  ----------------------------<  171<H>  5.5<H>   |  26  |  1.03    Ca    9.7      03 Feb 2022 06:48  Phos  3.8     02-02  Mg     1.9     02-02    TPro  9.2<H>  /  Alb  4.2  /  TBili  0.4  /  DBili  x   /  AST  17  /  ALT  23  /  AlkPhos  205<H>  02-02  PT/INR - ( 02 Feb 2022 10:54 )   PT: 13.8 sec;   INR: 1.16 ratio         PTT - ( 02 Feb 2022 10:54 )  PTT:33.2 sec      IMAGING: Reviewed by me.   (02.02.22 @ 03:59)   CT HEAD:  1. Progression of cytotoxic edema and petechial hemorrhage in the right   parieto-occipital infarct.  2. Age-indeterminate ischemia/infarct in the superior right cerebellum.  CTA BRAIN:  1. Patent intracranial circulation. No large vessel occlusion or   significant stenosis..  CTA NECK:  1. Patent cervical vasculature. Possible severe stenosis at the origin of   the right vertebral artery versus artifact from regional motion.    CT Brain Stroke Protocol (02.02.22 @ 00:42)   1. Hemorrhagic infarct in the right PCA territory.

## 2022-02-03 NOTE — PROGRESS NOTE ADULT - SUBJECTIVE AND OBJECTIVE BOX
Follow-up Pulm Progress Note    Awake & alert today  O2 sats 100% on 1-2LNC  No acute distress  Family on facetime    Medications:  MEDICATIONS  (STANDING):  amLODIPine   Tablet 5 milliGRAM(s) Oral daily  atorvastatin 40 milliGRAM(s) Oral at bedtime  brimonidine 0.2% Ophthalmic Solution 1 Drop(s) Both EYES two times a day  dextrose 40% Gel 15 Gram(s) Oral once  dextrose 5%. 1000 milliLiter(s) (50 mL/Hr) IV Continuous <Continuous>  dextrose 5%. 1000 milliLiter(s) (100 mL/Hr) IV Continuous <Continuous>  dextrose 50% Injectable 25 Gram(s) IV Push once  dextrose 50% Injectable 12.5 Gram(s) IV Push once  dextrose 50% Injectable 25 Gram(s) IV Push once  glucagon  Injectable 1 milliGRAM(s) IntraMuscular once  heparin   Injectable 5000 Unit(s) SubCutaneous every 12 hours  insulin glargine Injectable (LANTUS) 5 Unit(s) SubCutaneous at bedtime  insulin lispro (ADMELOG) corrective regimen sliding scale   SubCutaneous Before meals and at bedtime  lactated ringers. 1000 milliLiter(s) (75 mL/Hr) IV Continuous <Continuous>  latanoprost 0.005% Ophthalmic Solution 1 Drop(s) Both EYES at bedtime  levothyroxine 88 MICROGram(s) Oral daily    Vital Signs Last 24 Hrs  T(C): 36.9 (2022 04:00), Max: 37.1 (2022 20:00)  T(F): 98.4 (2022 04:00), Max: 98.7 (2022 20:00)  HR: 86 (2022 08:00) (57 - 91)  BP: 184/74 (2022 08:00) (118/56 - 184/74)  BP(mean): 106 (2022 08:00) (81 - 142)  RR: 17 (2022 08:00) (14 - 34)  SpO2: 100% (2022 08:00) (98% - 100%)    VBG pH 7.36 02- @ 00:40    VBG pCO2 47 02- @ 00:40    VBG O2 sat 94.3 02- @ 00:40    VBG lactate 2.2 02- @ 00:40     @ 07:01  -   @ 07:00  --------------------------------------------------------  IN: 1575 mL / OUT: 1290 mL / NET: 285 mL    LABS:                        10.0   14.79 )-----------( 302      ( 2022 06:49 )             32.6         137  |  101  |  15  ----------------------------<  171<H>  5.5<H>   |  26  |  1.03    Ca    9.7      2022 06:48  Phos  3.8       Mg     1.9         TPro  9.2<H>  /  Alb  4.2  /  TBili  0.4  /  DBili  x   /  AST  17  /  ALT  23  /  AlkPhos  205<H>        CARDIAC MARKERS ( 2022 00:37 )  x     / x     / 65 U/L / x     / x        CAPILLARY BLOOD GLUCOSE  POCT Blood Glucose.: 161 mg/dL (2022 09:00)    PT/INR - ( 2022 10:54 )   PT: 13.8 sec;   INR: 1.16 ratio      PTT - ( 2022 10:54 )  PTT:33.2 sec  Urinalysis Basic - ( 2022 01:00 )    Color: Colorless / Appearance: Clear / S.008 / pH: x  Gluc: x / Ketone: Negative  / Bili: Negative / Urobili: Negative   Blood: x / Protein: Trace / Nitrite: Negative   Leuk Esterase: Negative / RBC: 1 /hpf / WBC 0 /HPF   Sq Epi: x / Non Sq Epi: 0 /hpf / Bacteria: Negative      CULTURES:   Culture - Urine (collected 22 @ 03:59)  Source: Catheterized Catheterized  Final Report (22 @ 23:25):    No growth    Physical Examination:  PULM: Clear to auscultation bilaterally, no significant sputum production  CVS: S1, S2 heard    RADIOLOGY REVIEWED  CXR: grossly clear     < from: CT Head No Cont (22 @ 03:59) >  FINDINGS:    CT HEAD:    Progression of petechial hemorrhage and cytotoxic edema in the right   parieto-occipital infarct. Irregular hypodensity in the superior right   cerebellum, age indeterminate. No hydrocephalus. No central herniation.   Basal cisterns are clear.      CTA BRAIN    INTERNAL CAROTID ARTERIES:  The intracranial segments of the ICA are   patent without hemodynamically significant stenosis, occlusion, or   aneurysm. The ICA bifurcations are unremarkable.    ANTERIOR CEREBRAL ARTERIES: No flow-limiting stenosis or occlusion.   Anterior communicating artery is unremarkable without aneurysm.    MIDDLE CEREBRAL ARTERIES: No flow-limiting stenosis or occlusion. MCA   bifurcations are unremarkable without aneurysm.    POSTERIOR CEREBRAL ARTERIES: No flow-limiting stenosis or occlusion.   Fetal origin of the left PCA. Well-developed right posterior   communicating artery..    VERTEBROBASILAR SYSTEM: No flow-limiting stenosis or occlusion. Basilar   tip is unremarkable.    CTA NECK    RIGHT CAROTID SYSTEM: Normal in course and caliber without flow-limiting   stenosis or occlusion.    LEFT CAROTID SYSTEM: Normal in course and caliber without flow-limiting   stenosis or occlusion.    VERTEBRAL SYSTEM:  Normal in course and caliber  without flow-limiting   stenosis or occlusion. Origin of the right vertebral artery appears   markedly stenotic, however evaluation is degraded by regional motion   artifact. The right superior cerebellar artery is patent but attenuated   compared to the left.    AORTIC ARCH: Bovine arch branching variant. Origin of the great arteries   is patent.    Miscellaneous: Mediastinal adenopathy.    IMPRESSION:    CT HEAD:  1. Progression of cytotoxic edema and petechial hemorrhage in the right   parieto-occipital infarct.  2. Age-indeterminate ischemia/infarct in the superior right cerebellum.    CTA BRAIN:  1. Patent intracranial circulation. No large vessel occlusion or   significant stenosis..    CTA NECK:  1. Patent cervical vasculature. Possible severe stenosis at the origin of   the right vertebral artery versus artifact from regional motion.    Discussed with Dr. Monaco on 2022 at 4:06 AM.    --- End of Report ---    < end of copied text >

## 2022-02-03 NOTE — PROGRESS NOTE ADULT - SUBJECTIVE AND OBJECTIVE BOX
DATE OF SERVICE: 22 @ 19:44    Patient is a 89y old  Female who presents with a chief complaint of concern for stroke (2022 13:47)      SUBJECTIVE / OVERNIGHT EVENTS:  alert and responsive , confused    MEDICATIONS  (STANDING):  amLODIPine   Tablet 5 milliGRAM(s) Oral daily  atorvastatin 40 milliGRAM(s) Oral at bedtime  brimonidine 0.2% Ophthalmic Solution 1 Drop(s) Both EYES two times a day  dextrose 40% Gel 15 Gram(s) Oral once  dextrose 5%. 1000 milliLiter(s) (50 mL/Hr) IV Continuous <Continuous>  dextrose 5%. 1000 milliLiter(s) (100 mL/Hr) IV Continuous <Continuous>  dextrose 50% Injectable 25 Gram(s) IV Push once  dextrose 50% Injectable 12.5 Gram(s) IV Push once  dextrose 50% Injectable 25 Gram(s) IV Push once  glucagon  Injectable 1 milliGRAM(s) IntraMuscular once  heparin   Injectable 5000 Unit(s) SubCutaneous every 12 hours  insulin glargine Injectable (LANTUS) 5 Unit(s) SubCutaneous at bedtime  insulin lispro (ADMELOG) corrective regimen sliding scale   SubCutaneous Before meals and at bedtime  lactated ringers. 1000 milliLiter(s) (75 mL/Hr) IV Continuous <Continuous>  latanoprost 0.005% Ophthalmic Solution 1 Drop(s) Both EYES at bedtime  levothyroxine 88 MICROGram(s) Oral daily    MEDICATIONS  (PRN):  acetaminophen     Tablet .. 650 milliGRAM(s) Oral every 6 hours PRN Mild Pain (1 - 3), Moderate Pain (4 - 6)      Vital Signs Last 24 Hrs  T(C): 36.9 (2022 12:00), Max: 37.1 (2022 20:00)  T(F): 98.5 (2022 12:00), Max: 98.7 (2022 20:00)  HR: 76 (2022 18:00) (57 - 98)  BP: 159/68 (2022 18:00) (147/67 - 184/74)  BP(mean): 98 (2022 18:00) (98 - 142)  RR: 15 (2022 18:00) (14 - 29)  SpO2: 100% (2022 18:00) (94% - 100%)  CAPILLARY BLOOD GLUCOSE      POCT Blood Glucose.: 140 mg/dL (2022 18:28)  POCT Blood Glucose.: 176 mg/dL (2022 12:06)  POCT Blood Glucose.: 161 mg/dL (2022 09:00)  POCT Blood Glucose.: 217 mg/dL (2022 22:28)    I&O's Summary    2022 07:01  -  2022 07:00  --------------------------------------------------------  IN: 1575 mL / OUT: 1290 mL / NET: 285 mL    2022 07:01  -  2022 19:44  --------------------------------------------------------  IN: 225 mL / OUT: 0 mL / NET: 225 mL        PHYSICAL EXAM:  GENERAL: NAD, well-developed  HEAD:  Atraumatic, Normocephalic  EYES: EOMI, PERRLA, conjunctiva and sclera clear  NECK: Supple, No JVD  CHEST/LUNG: Clear to auscultation bilaterally; No wheeze  HEART: Regular rate and rhythm; No murmurs, rubs, or gallops  ABDOMEN: Soft, Nontender, Nondistended; Bowel sounds present  EXTREMITIES:  2+ Peripheral Pulses, No clubbing, cyanosis, or edema  NEUROLOGICAL EXAM:  Mental status: Awake, alert, oriented x age and name but with fluctuations, follows some simple commands and generates some short phrases   Cranial Nerves: left facial palsy, no nystagmus, no dysarthria,  tongue midline  Motor exam: Normal tone, no drift, moves all extremities against gravity   Sensation: Intact to light touch   Coordination/ Gait: gait not assessed     LABS:                        10.0   14.79 )-----------( 302      ( 2022 06:49 )             32.6     02-03    136  |  100  |  14  ----------------------------<  171<H>  4.3   |  26  |  1.02    Ca    9.8      2022 10:10  Phos  3.8       Mg     1.9         TPro  9.2<H>  /  Alb  4.2  /  TBili  0.4  /  DBili  x   /  AST  17  /  ALT  23  /  AlkPhos  205<H>      PT/INR - ( 2022 10:54 )   PT: 13.8 sec;   INR: 1.16 ratio         PTT - ( 2022 10:54 )  PTT:33.2 sec  CARDIAC MARKERS ( 2022 00:37 )  x     / x     / 65 U/L / x     / x          Urinalysis Basic - ( 2022 01:00 )    Color: Colorless / Appearance: Clear / S.008 / pH: x  Gluc: x / Ketone: Negative  / Bili: Negative / Urobili: Negative   Blood: x / Protein: Trace / Nitrite: Negative   Leuk Esterase: Negative / RBC: 1 /hpf / WBC 0 /HPF   Sq Epi: x / Non Sq Epi: 0 /hpf / Bacteria: Negative        RADIOLOGY & ADDITIONAL TESTS:    Imaging Personally Reviewed:    Consultant(s) Notes Reviewed:      Care Discussed with Consultants/Other Providers:

## 2022-02-03 NOTE — PROGRESS NOTE ADULT - ASSESSMENT
89 y/o F with PMH of HTN, HLD, ?TIA,. Presents to ED for change in mental status, L facial droop, L hemiparesis. Found to have R parieto-occipital lobe petechial hemorrhage and possible infarct with age indeterminate in superior R cerebellum. COVID PCR on admission. Pulmonary called to consult for hypoxia, reportedly spo2 low 90s on arrival. CXR grossly clear. Spoke with daughter Feli (), notes mother is COVID vaccinated x2 doses, did not receive booster. No hx of lung disease, never smoker. No prior COVID infections. Unable to participate in ROS 2nd to mental status. O2 sats 100% on 2LNC, 96% on RA.

## 2022-02-03 NOTE — EEG REPORT - NS EEG TEXT BOX
[facility]   COMPREHENSIVE EPILEPSY CENTER   REPORT OF LONG-TERM VIDEO EEG     [facility]: [address], [place] [address], Ph#: [phone]  [facility]: [id number] [place] [address], Ph#: [phone]  [facility]: [address] [place] [address], Ph#: [phone]    Patient Name: GENARO PATEL  Age and : 89y ([birth year])  MRN #: [id number]  Location: [facility] 5U I519 W1  Referring Physician: Arnol Zacarias    Start Time/Date: 16:09 on 2022  End Time/Date: 08:00 on 2022  Duration: 15:20    _____________________________________________________________  STUDY INFORMATION    EEG Recording Technique:  The patient underwent continuous Video-EEG monitoring, using Telemetry System hardware on the XLTek Digital System. EEG and video data were stored on a computer hard drive with important events saved in digital archive files. The material was reviewed by a physician (electroencephalographer / epileptologist) on a daily basis. Jack and seizure detection algorithms were utilized and reviewed. An EEG Technician attended to the patient, and was available throughout daytime work hours.  The epilepsy center neurologist was available in person or on call 24-hours per day.    EEG Placement and Labeling of Electrodes:  The EEG was performed utilizing 20 channel referential EEG connections (coronal over temporal over parasagittal montage) using all standard 10-20 electrode placements with EKG, with additional electrodes placed in the inferior temporal region using the modified 10-10 montage electrode placements for elective admissions, or if deemed necessary. Recording was at a sampling rate of 256 samples per second per channel. Time synchronized digital video recording was done simultaneously with EEG recording. A low light infrared camera was used for low light recording.     _____________________________________________________________  HISTORY    Patient is a 89y old  Female who presents with a chief complaint of concern for stroke (2022 06:50)    PERTINENT MEDICATION:  none  _____________________________________________________________  STUDY INTERPRETATION    Findings: The background was continuous, spontaneously variable and reactive. During wakefulness, the posterior dominant rhythm consisted of fragmentary 7 Hz activity, with amplitude to 30 uV, that attenuated to eye opening.  Low amplitude frontal beta was noted in wakefulness.    Background Slowing:  Diffuse theta and polymorphic delta slowing occasional 1.5-2Hz generalized rhythmic delta (GRDA)    Focal Slowing:   None were present.    Sleep Background:  Drowsiness was characterized by fragmentation, attenuation, and slowing of the background activity.    Stage II sleep transients were not recorded.    Other Non-Epileptiform Findings:  None were present.    Interictal Epileptiform Activity:   none    Events:  Clinical events: None recorded.  Seizures: None recorded.    Activation Procedures:   Hyperventilation was not performed.    Photic stimulation was not performed.     Artifacts:  Intermittent myogenic and movement artifacts were noted.    ECG:  The heart rate on single channel ECG was predominantly between 79-80 BPM.    _____________________________________________________________  EEG SUMMARY/CLASSIFICATION    Abnormal EEG in the awake, drowsy and asleep states.  - Moderate generalized slowing.    _____________________________________________________________  EEG IMPRESSION/CLINICAL CORRELATE    Abnormal EEG study.  Moderate nonspecific diffuse or multifocal cerebral dysfunction.   No epileptiform pattern or seizure seen.    Manny Nichole MD PGY-5  Epilepsy Fellow    This Preliminary report is based on fellow review. Final report pending attending review.    Reading Room: [phone]  On Call Service After Hours: [phone] [facility]   COMPREHENSIVE EPILEPSY CENTER   REPORT OF LONG-TERM VIDEO EEG     [facility]: [address], [place] [address], Ph#: [phone]  [facility]: [id number] [place] [address], Ph#: [phone]  [facility]: [address] [place] [address], Ph#: [phone]    Patient Name: GENARO PATEL  Age and : 89y ([birth year])  MRN #: [id number]  Location: [facility] 5U I519 W1  Referring Physician: Arnol Zacarias    Start Time/Date: 16:09 on 2022  End Time/Date: 08:00 on 2022  Duration: 15:20h    _____________________________________________________________  STUDY INFORMATION    EEG Recording Technique:  The patient underwent continuous Video-EEG monitoring, using Telemetry System hardware on the XLTek Digital System. EEG and video data were stored on a computer hard drive with important events saved in digital archive files. The material was reviewed by a physician (electroencephalographer / epileptologist) on a daily basis. Jack and seizure detection algorithms were utilized and reviewed. An EEG Technician attended to the patient, and was available throughout daytime work hours.  The epilepsy center neurologist was available in person or on call 24-hours per day.    EEG Placement and Labeling of Electrodes:  The EEG was performed utilizing 20 channel referential EEG connections (coronal over temporal over parasagittal montage) using all standard 10-20 electrode placements with EKG, with additional electrodes placed in the inferior temporal region using the modified 10-10 montage electrode placements for elective admissions, or if deemed necessary. Recording was at a sampling rate of 256 samples per second per channel. Time synchronized digital video recording was done simultaneously with EEG recording. A low light infrared camera was used for low light recording.     _____________________________________________________________  HISTORY    Patient is a 89y old  Female who presents with a chief complaint of concern for stroke (2022 06:50)      _____________________________________________________________  STUDY INTERPRETATION    Findings: The background was continuous, spontaneously variable and reactive. During wakefulness, the posterior dominant rhythm consisted of fragmentary 7 Hz activity, with amplitude to 30 uV, that attenuated to eye opening.  Low amplitude frontal beta was noted in wakefulness.    Background Slowing:  Diffuse theta and polymorphic delta slowing occasional 1.5-2Hz generalized rhythmic delta (GRDA)    Focal Slowing:   None were present.    Sleep Background:  Drowsiness was characterized by fragmentation, attenuation, and slowing of the background activity.    Stage II sleep transients were not recorded.    Other Non-Epileptiform Findings:  None were present.    Interictal Epileptiform Activity:   none    Events:  Clinical events: None recorded.  Seizures: None recorded.    Activation Procedures:   Hyperventilation was not performed.    Photic stimulation was not performed.     Artifacts:  Intermittent myogenic and movement artifacts were noted.    ECG:  The heart rate on single channel ECG was predominantly between 79-80 BPM.    _____________________________________________________________  EEG SUMMARY/CLASSIFICATION    Abnormal EEG in the awake, drowsy and asleep states.  - Moderate generalized slowing.    _____________________________________________________________  EEG IMPRESSION/CLINICAL CORRELATE    Abnormal EEG study.  Moderate nonspecific diffuse or multifocal cerebral dysfunction.   No epileptiform pattern or seizure seen.    Manny Nichole MD PGY-5  Epilepsy Fellow    Reading Room: [phone]  On Call Service After Hours: [phone]

## 2022-02-03 NOTE — CONSULT NOTE ADULT - REASON FOR ADMISSION
concern for stroke
change in mental status, slump over
concern for stroke
concern for stroke

## 2022-02-03 NOTE — CONSULT NOTE ADULT - CONSULT REASON
Rehabilitation consult
code stroke
multiple medical problems  covid 19
MGUS
hypoxia
Cardiac Management

## 2022-02-03 NOTE — CONSULT NOTE ADULT - SUBJECTIVE AND OBJECTIVE BOX
HPI:  HPI: Patient is a 89yo RH F PMHx HTN, HLD, ?TIA not on ac/ap agents, who presents to ED for change in mental status, slumping over, L facial droop, and possible L hemiparesis. Patient last seen by accompanying family member at 11 AM 22 with mild confusion. Patient went to sleep and woke up in evening of 22 asking for help. Was found slumped over, slurred speech and L facial droop. EMS called for concern of stroke. At baseline, able to feed herself, use bathroom herself. Ambulates with walker. Needs assistance with other ADLs.     Patient was admitted on , found to have right parieto occipital lobe petechial hemorrhage and possible infarct with age indeterminate infarct in superior R cerebellum, also COVID +. Patient seen today, no new complaints     REVIEW OF SYSTEMS  Constitutional - No fever, No weight loss, No fatigue  HEENT - No eye pain, No visual disturbances, +[place], No tinnitus, No vertigo, No neck pain  Respiratory - No cough, No wheezing, No shortness of breath  Cardiovascular - No chest pain, No palpitations  Gastrointestinal - No abdominal pain, No nausea, No vomiting, No diarrhea, No constipation  Genitourinary - No dysuria, No frequency, No hematuria, No incontinence  Psychiatric - No depression, No anxiety    VITALS  T(C): 36.9 (22 @ 04:00), Max: 37.1 (22 @ 20:00)  HR: 86 (22 @ 08:00) (57 - 91)  BP: 184/74 (22 @ 08:00) (118/56 - 184/74)  RR: 17 (22 @ 08:00) (14 - 34)  SpO2: 100% (22 @ 08:00) (98% - 100%)  Wt(kg): --    PAST MEDICAL & SURGICAL HISTORY  Diabetes    Hypertension    Hyperlipemia    Hypothyroid    Hypothyroidism    TIA (transient ischemic attack)    No significant past surgical history    S/P cholecystectomy        SOCIAL HISTORY  Smoking - Denied  EtOH - Denied   Drugs - Denied    FUNCTIONAL HISTORY  Lives with family in [place]   ambulates with RW, needs assist with some ADLs    CURRENT FUNCTIONAL STATUS  2/2 SLP  oral dysphagia, suspected pharyngeal dysphagia   expressive and receptive aphasia  cognitive linguistic deficits   dysarthria   puree diet/mildly thick liquids     2/2 PT  bed mobility max assist  transfers max assist  gait max assist x 1-2 steps   follows commands approx 80% of the time.    FAMILY HISTORY   Family history unknown        RECENT LABS/IMAGING  CBC Full  -  ( 2022 06:49 )  WBC Count : 14.79 K/uL  RBC Count : 3.94 M/uL  Hemoglobin : 10.0 g/dL  Hematocrit : 32.6 %  Platelet Count - Automated : 302 K/uL  Mean Cell Volume : 82.7 fl  Mean Cell Hemoglobin : 25.4 pg  Mean Cell Hemoglobin Concentration : 30.7 gm/dL  Auto Neutrophil # : x  Auto Lymphocyte # : x  Auto Monocyte # : x  Auto Eosinophil # : x  Auto Basophil # : x  Auto Neutrophil % : x  Auto Lymphocyte % : x  Auto Monocyte % : x  Auto Eosinophil % : x  Auto Basophil % : x        137  |  101  |  15  ----------------------------<  171<H>  5.5<H>   |  26  |  1.03    Ca    9.7      2022 06:48  Phos  3.8       Mg     1.9         TPro  9.2<H>  /  Alb  4.2  /  TBili  0.4  /  DBili  x   /  AST  17  /  ALT  23  /  AlkPhos  205<H>      Urinalysis Basic - ( 2022 01:00 )    Color: Colorless / Appearance: Clear / S.008 / pH: x  Gluc: x / Ketone: Negative  / Bili: Negative / Urobili: Negative   Blood: x / Protein: Trace / Nitrite: Negative   Leuk Esterase: Negative / RBC: 1 /hpf / WBC 0 /HPF   Sq Epi: x / Non Sq Epi: 0 /hpf / Bacteria: Negative    < from: CT Head No Cont (22 @ 03:59) >    IMPRESSION:    CT HEAD:  1. Progression of cytotoxic edema and petechial hemorrhage in the right   parieto-occipital infarct.  2. Age-indeterminate ischemia/infarct in the superior right cerebellum.    CTA BRAIN:  1. Patent intracranial circulation. No large vessel occlusion or   significant stenosis..    CTA NECK:  1. Patent cervical vasculature. Possible severe stenosis at the origin of   the right vertebral artery versus artifact from regional motion.      < end of copied text >      ALLERGIES  No Known Allergies      MEDICATIONS   amLODIPine   Tablet 5 milliGRAM(s) Oral daily  atorvastatin 40 milliGRAM(s) Oral at bedtime  brimonidine 0.2% Ophthalmic Solution 1 Drop(s) Both EYES two times a day  dextrose 40% Gel 15 Gram(s) Oral once  dextrose 5%. 1000 milliLiter(s) IV Continuous <Continuous>  dextrose 5%. 1000 milliLiter(s) IV Continuous <Continuous>  dextrose 50% Injectable 25 Gram(s) IV Push once  dextrose 50% Injectable 12.5 Gram(s) IV Push once  dextrose 50% Injectable 25 Gram(s) IV Push once  glucagon  Injectable 1 milliGRAM(s) IntraMuscular once  heparin   Injectable 5000 Unit(s) SubCutaneous every 12 hours  insulin glargine Injectable (LANTUS) 5 Unit(s) SubCutaneous at bedtime  insulin lispro (ADMELOG) corrective regimen sliding scale   SubCutaneous Before meals and at bedtime  lactated ringers. 1000 milliLiter(s) IV Continuous <Continuous>  latanoprost 0.005% Ophthalmic Solution 1 Drop(s) Both EYES at bedtime  levothyroxine 88 MICROGram(s) Oral daily      ----------------------------------------------------------------------------------------  PHYSICAL EXAM  Constitutional - NAD, Comfortable, in bed   Chest - Breathing comfortably, +NC  Cardiovascular - S1S2   Abdomen - Soft   Extremities - No C/C/E, No calf tenderness   Neurologic Exam -                    Cognitive - Awake, Alert, AAO to self, place, month, year, situation     Communication - non fluent, follows commands       L facial droop        Motor -left sided weakness        Psychiatric - Mood stable, Affect WNL  ----------------------------------------------------------------------------------------  ASSESSMENT/PLAN  89yFemale PMHx HTN, HLD, ?TIA not on ac/ap agents with functional deficits after R parieto-occipital lobe petechial hemorrhage and possible infarct with age indeterminate infarct in superior R cerebellum  MRI/TTE pending   Pain - Tylenol  DVT PPX - SCDs, heparin   Rehab - Will continue to follow for ongoing rehab needs and recommendations.    Recommend ACUTE inpatient rehabilitation for the functional deficits consisting of 3 hours of therapy/day & 24 hour RN/daily PMR physician for comorbid medical management. Patient will be able to tolerate 3 hours a day.    HPI:  HPI: Patient is a 89yo RH F PMHx HTN, HLD, ?TIA not on ac/ap agents, who presents to ED for change in mental status, slumping over, L facial droop, and possible L hemiparesis. Patient last seen by accompanying family member at 11 AM 22 with mild confusion. Patient went to sleep and woke up in evening of 22 asking for help. Was found slumped over, slurred speech and L facial droop. EMS called for concern of stroke. At baseline, able to feed herself, use bathroom herself. Ambulates with walker. Needs assistance with other ADLs.     Patient was admitted on , found to have right parieto occipital lobe petechial hemorrhage and possible infarct with age indeterminate infarct in superior R cerebellum, also COVID +. Patient seen today, no new complaints     REVIEW OF SYSTEMS  Constitutional - No fever, No weight loss, No fatigue  HEENT - No eye pain, No visual disturbances, +[place], No tinnitus, No vertigo, No neck pain  Respiratory - No cough, No wheezing, No shortness of breath  Cardiovascular - No chest pain, No palpitations  Gastrointestinal - No abdominal pain, No nausea, No vomiting, No diarrhea, No constipation  Genitourinary - No dysuria, No frequency, No hematuria, No incontinence  Psychiatric - No depression, No anxiety    VITALS  T(C): 36.9 (22 @ 04:00), Max: 37.1 (22 @ 20:00)  HR: 86 (22 @ 08:00) (57 - 91)  BP: 184/74 (22 @ 08:00) (118/56 - 184/74)  RR: 17 (22 @ 08:00) (14 - 34)  SpO2: 100% (22 @ 08:00) (98% - 100%)  Wt(kg): --    PAST MEDICAL & SURGICAL HISTORY  Diabetes    Hypertension    Hyperlipemia    Hypothyroid    Hypothyroidism    TIA (transient ischemic attack)    No significant past surgical history    S/P cholecystectomy        SOCIAL HISTORY  Smoking - Denied  EtOH - Denied   Drugs - Denied    FUNCTIONAL HISTORY  Lives with family in [place]   ambulates with RW, needs assist with some ADLs    CURRENT FUNCTIONAL STATUS  2/2 SLP  oral dysphagia, suspected pharyngeal dysphagia   expressive and receptive aphasia  cognitive linguistic deficits   dysarthria   puree diet/mildly thick liquids     2/2 PT  bed mobility max assist  transfers max assist  gait max assist x 1-2 steps   follows commands approx 80% of the time.    FAMILY HISTORY   Family history unknown        RECENT LABS/IMAGING  CBC Full  -  ( 2022 06:49 )  WBC Count : 14.79 K/uL  RBC Count : 3.94 M/uL  Hemoglobin : 10.0 g/dL  Hematocrit : 32.6 %  Platelet Count - Automated : 302 K/uL  Mean Cell Volume : 82.7 fl  Mean Cell Hemoglobin : 25.4 pg  Mean Cell Hemoglobin Concentration : 30.7 gm/dL  Auto Neutrophil # : x  Auto Lymphocyte # : x  Auto Monocyte # : x  Auto Eosinophil # : x  Auto Basophil # : x  Auto Neutrophil % : x  Auto Lymphocyte % : x  Auto Monocyte % : x  Auto Eosinophil % : x  Auto Basophil % : x        137  |  101  |  15  ----------------------------<  171<H>  5.5<H>   |  26  |  1.03    Ca    9.7      2022 06:48  Phos  3.8       Mg     1.9         TPro  9.2<H>  /  Alb  4.2  /  TBili  0.4  /  DBili  x   /  AST  17  /  ALT  23  /  AlkPhos  205<H>      Urinalysis Basic - ( 2022 01:00 )    Color: Colorless / Appearance: Clear / S.008 / pH: x  Gluc: x / Ketone: Negative  / Bili: Negative / Urobili: Negative   Blood: x / Protein: Trace / Nitrite: Negative   Leuk Esterase: Negative / RBC: 1 /hpf / WBC 0 /HPF   Sq Epi: x / Non Sq Epi: 0 /hpf / Bacteria: Negative    < from: CT Head No Cont (22 @ 03:59) >    IMPRESSION:    CT HEAD:  1. Progression of cytotoxic edema and petechial hemorrhage in the right   parieto-occipital infarct.  2. Age-indeterminate ischemia/infarct in the superior right cerebellum.    CTA BRAIN:  1. Patent intracranial circulation. No large vessel occlusion or   significant stenosis..    CTA NECK:  1. Patent cervical vasculature. Possible severe stenosis at the origin of   the right vertebral artery versus artifact from regional motion.      < end of copied text >      ALLERGIES  No Known Allergies      MEDICATIONS   amLODIPine   Tablet 5 milliGRAM(s) Oral daily  atorvastatin 40 milliGRAM(s) Oral at bedtime  brimonidine 0.2% Ophthalmic Solution 1 Drop(s) Both EYES two times a day  dextrose 40% Gel 15 Gram(s) Oral once  dextrose 5%. 1000 milliLiter(s) IV Continuous <Continuous>  dextrose 5%. 1000 milliLiter(s) IV Continuous <Continuous>  dextrose 50% Injectable 25 Gram(s) IV Push once  dextrose 50% Injectable 12.5 Gram(s) IV Push once  dextrose 50% Injectable 25 Gram(s) IV Push once  glucagon  Injectable 1 milliGRAM(s) IntraMuscular once  heparin   Injectable 5000 Unit(s) SubCutaneous every 12 hours  insulin glargine Injectable (LANTUS) 5 Unit(s) SubCutaneous at bedtime  insulin lispro (ADMELOG) corrective regimen sliding scale   SubCutaneous Before meals and at bedtime  lactated ringers. 1000 milliLiter(s) IV Continuous <Continuous>  latanoprost 0.005% Ophthalmic Solution 1 Drop(s) Both EYES at bedtime  levothyroxine 88 MICROGram(s) Oral daily      ----------------------------------------------------------------------------------------  PHYSICAL EXAM  Constitutional - NAD, Comfortable, in bed   Chest - Breathing comfortably, +NC  Cardiovascular - S1S2   Abdomen - Soft   Extremities - No C/C/E, No calf tenderness   Neurologic Exam -                    Cognitive - Awake, Alert, AAO to self, place, month, year, situation     Communication - non fluent, follows commands       L facial droop        Motor -left sided weakness        Psychiatric - Mood stable, Affect WNL  ----------------------------------------------------------------------------------------  ASSESSMENT/PLAN  89yFemale PMHx HTN, HLD, ?TIA not on ac/ap agents with functional deficits after R parieto-occipital lobe petechial hemorrhage and possible infarct with age indeterminate infarct in superior R cerebellum  MRI/TTE pending   COVID +2/2, needs to be 10 days from positive PCR for acute rehab   Pain - Tylenol  DVT PPX - SCDs, heparin   Rehab - Will continue to follow for ongoing rehab needs and recommendations.    Recommend ACUTE inpatient rehabilitation for the functional deficits consisting of 3 hours of therapy/day & 24 hour RN/daily PMR physician for comorbid medical management. Patient will be able to tolerate 3 hours a day.

## 2022-02-04 LAB
ANION GAP SERPL CALC-SCNC: 8 MMOL/L — SIGNIFICANT CHANGE UP (ref 5–17)
BUN SERPL-MCNC: 16 MG/DL — SIGNIFICANT CHANGE UP (ref 7–23)
CALCIUM SERPL-MCNC: 9.3 MG/DL — SIGNIFICANT CHANGE UP (ref 8.4–10.5)
CHLORIDE SERPL-SCNC: 101 MMOL/L — SIGNIFICANT CHANGE UP (ref 96–108)
CO2 SERPL-SCNC: 28 MMOL/L — SIGNIFICANT CHANGE UP (ref 22–31)
CREAT SERPL-MCNC: 1.08 MG/DL — SIGNIFICANT CHANGE UP (ref 0.5–1.3)
D DIMER BLD IA.RAPID-MCNC: 522 NG/ML DDU — HIGH
GLUCOSE BLDC GLUCOMTR-MCNC: 127 MG/DL — HIGH (ref 70–99)
GLUCOSE BLDC GLUCOMTR-MCNC: 134 MG/DL — HIGH (ref 70–99)
GLUCOSE BLDC GLUCOMTR-MCNC: 138 MG/DL — HIGH (ref 70–99)
GLUCOSE BLDC GLUCOMTR-MCNC: 139 MG/DL — HIGH (ref 70–99)
GLUCOSE BLDC GLUCOMTR-MCNC: 153 MG/DL — HIGH (ref 70–99)
GLUCOSE SERPL-MCNC: 162 MG/DL — HIGH (ref 70–99)
HCT VFR BLD CALC: 31.1 % — LOW (ref 34.5–45)
HGB BLD-MCNC: 9.6 G/DL — LOW (ref 11.5–15.5)
MCHC RBC-ENTMCNC: 25.7 PG — LOW (ref 27–34)
MCHC RBC-ENTMCNC: 30.9 GM/DL — LOW (ref 32–36)
MCV RBC AUTO: 83.2 FL — SIGNIFICANT CHANGE UP (ref 80–100)
NRBC # BLD: 0 /100 WBCS — SIGNIFICANT CHANGE UP (ref 0–0)
PLATELET # BLD AUTO: 342 K/UL — SIGNIFICANT CHANGE UP (ref 150–400)
POTASSIUM SERPL-MCNC: 4.2 MMOL/L — SIGNIFICANT CHANGE UP (ref 3.5–5.3)
POTASSIUM SERPL-SCNC: 4.2 MMOL/L — SIGNIFICANT CHANGE UP (ref 3.5–5.3)
RBC # BLD: 3.74 M/UL — LOW (ref 3.8–5.2)
RBC # FLD: 14.1 % — SIGNIFICANT CHANGE UP (ref 10.3–14.5)
SODIUM SERPL-SCNC: 137 MMOL/L — SIGNIFICANT CHANGE UP (ref 135–145)
WBC # BLD: 10.23 K/UL — SIGNIFICANT CHANGE UP (ref 3.8–10.5)
WBC # FLD AUTO: 10.23 K/UL — SIGNIFICANT CHANGE UP (ref 3.8–10.5)

## 2022-02-04 PROCEDURE — 95718 EEG PHYS/QHP 2-12 HR W/VEEG: CPT

## 2022-02-04 RX ORDER — AMLODIPINE BESYLATE 2.5 MG/1
5 TABLET ORAL ONCE
Refills: 0 | Status: COMPLETED | OUTPATIENT
Start: 2022-02-04 | End: 2022-02-04

## 2022-02-04 RX ORDER — AMLODIPINE BESYLATE 2.5 MG/1
10 TABLET ORAL DAILY
Refills: 0 | Status: DISCONTINUED | OUTPATIENT
Start: 2022-02-05 | End: 2022-02-10

## 2022-02-04 RX ADMIN — AMLODIPINE BESYLATE 5 MILLIGRAM(S): 2.5 TABLET ORAL at 06:10

## 2022-02-04 RX ADMIN — Medication 1: at 07:56

## 2022-02-04 RX ADMIN — BRIMONIDINE TARTRATE 1 DROP(S): 2 SOLUTION/ DROPS OPHTHALMIC at 17:19

## 2022-02-04 RX ADMIN — Medication 88 MICROGRAM(S): at 06:10

## 2022-02-04 RX ADMIN — HEPARIN SODIUM 5000 UNIT(S): 5000 INJECTION INTRAVENOUS; SUBCUTANEOUS at 17:19

## 2022-02-04 RX ADMIN — AMLODIPINE BESYLATE 5 MILLIGRAM(S): 2.5 TABLET ORAL at 07:52

## 2022-02-04 RX ADMIN — HEPARIN SODIUM 5000 UNIT(S): 5000 INJECTION INTRAVENOUS; SUBCUTANEOUS at 06:09

## 2022-02-04 RX ADMIN — BRIMONIDINE TARTRATE 1 DROP(S): 2 SOLUTION/ DROPS OPHTHALMIC at 06:38

## 2022-02-04 RX ADMIN — LATANOPROST 1 DROP(S): 0.05 SOLUTION/ DROPS OPHTHALMIC; TOPICAL at 21:16

## 2022-02-04 RX ADMIN — SODIUM CHLORIDE 75 MILLILITER(S): 9 INJECTION, SOLUTION INTRAVENOUS at 17:21

## 2022-02-04 RX ADMIN — ATORVASTATIN CALCIUM 40 MILLIGRAM(S): 80 TABLET, FILM COATED ORAL at 21:15

## 2022-02-04 RX ADMIN — INSULIN GLARGINE 5 UNIT(S): 100 INJECTION, SOLUTION SUBCUTANEOUS at 21:33

## 2022-02-04 NOTE — PROGRESS NOTE ADULT - ASSESSMENT
Patient is a 89yo RH F PMHx HTN, HLD, ?TIA not on ac/ap agents, who presents to ED for change in mental status, slumping over, L facial droop, dysarthria and possible L hemiparesis.    CVA  - workup as per neurology    COVID 19 pna  - pt is not hypoxic  - keep O2 saturation above 92%  - follow informatory markers  - check ct chest  - pulm is following    elevated d dimer  - mason lower ext doppler negative for dvt    dysphagia  - swallow eval  - Puree diet and Mildly Thick Liquids    dvt px  - sq heparin

## 2022-02-04 NOTE — PROGRESS NOTE ADULT - SUBJECTIVE AND OBJECTIVE BOX
Follow-up Pulm Progress Note    Awake & alert  O2 sats 100% on 4LNC, no documented desaturations  Lowered to 3LNC now  Denies CP, SOB    Medications:  MEDICATIONS  (STANDING):  atorvastatin 40 milliGRAM(s) Oral at bedtime  brimonidine 0.2% Ophthalmic Solution 1 Drop(s) Both EYES two times a day  dextrose 40% Gel 15 Gram(s) Oral once  dextrose 5%. 1000 milliLiter(s) (50 mL/Hr) IV Continuous <Continuous>  dextrose 5%. 1000 milliLiter(s) (100 mL/Hr) IV Continuous <Continuous>  dextrose 50% Injectable 25 Gram(s) IV Push once  dextrose 50% Injectable 12.5 Gram(s) IV Push once  dextrose 50% Injectable 25 Gram(s) IV Push once  glucagon  Injectable 1 milliGRAM(s) IntraMuscular once  heparin   Injectable 5000 Unit(s) SubCutaneous every 12 hours  insulin glargine Injectable (LANTUS) 5 Unit(s) SubCutaneous at bedtime  insulin lispro (ADMELOG) corrective regimen sliding scale   SubCutaneous Before meals and at bedtime  lactated ringers. 1000 milliLiter(s) (75 mL/Hr) IV Continuous <Continuous>  latanoprost 0.005% Ophthalmic Solution 1 Drop(s) Both EYES at bedtime  levothyroxine 88 MICROGram(s) Oral daily    MEDICATIONS  (PRN):  acetaminophen     Tablet .. 650 milliGRAM(s) Oral every 6 hours PRN Mild Pain (1 - 3), Moderate Pain (4 - 6)    Vital Signs Last 24 Hrs  T(C): 37.4 (04 Feb 2022 12:00), Max: 37.4 (04 Feb 2022 12:00)  T(F): 99.3 (04 Feb 2022 12:00), Max: 99.3 (04 Feb 2022 12:00)  HR: 75 (04 Feb 2022 12:00) (57 - 86)  BP: 157/68 (04 Feb 2022 12:00) (141/63 - 170/75)  BP(mean): 98 (04 Feb 2022 12:00) (90 - 138)  RR: 18 (04 Feb 2022 12:00) (13 - 33)  SpO2: 100% (04 Feb 2022 12:00) (93% - 100%)    02-03 @ 07:01  -  02-04 @ 07:00  --------------------------------------------------------  IN: 1050 mL / OUT: 1101 mL / NET: -51 mL    LABS:                        9.6    10.23 )-----------( 342      ( 04 Feb 2022 00:51 )             31.1     02-04    137  |  101  |  16  ----------------------------<  162<H>  4.2   |  28  |  1.08    Ca    9.3      04 Feb 2022 00:54    CAPILLARY BLOOD GLUCOSE  POCT Blood Glucose.: 139 mg/dL (04 Feb 2022 12:50)    CULTURES:    Culture - Blood (collected 02-02-22 @ 19:26)  Source: .Blood Blood-Peripheral  Preliminary Report (02-03-22 @ 20:01):    No growth to date.    Culture - Blood (collected 02-02-22 @ 14:51)  Source: .Blood Blood-Peripheral  Preliminary Report (02-03-22 @ 15:02):    No growth to date.    Culture - Urine (collected 02-02-22 @ 03:59)  Source: Catheterized Catheterized  Final Report (02-02-22 @ 23:25):    No growth    Physical Examination:  PULM: Decreased at bases  CVS: RRR    RADIOLOGY REVIEWED    CT chest: < from: CT Chest No Cont (02.03.22 @ 11:44) >  FINDINGS:    LYMPH NODES: No lymphadenopathy.    HEART/VASCULATURE: Cardiomegaly. Coronary artery and mitral annular   calcifications. There is no pericardial effusion. Aortic calcifications.    AIRWAYS/LUNGS/PLEURA: Patent central airways. Minimal bibasilar linear   atelectasis. The lungs are otherwise clear. There is a small right   pleural effusion.    UPPER ABDOMEN: 2.9 cm left upper pole renal partially imaged cyst. Tiny   hiatal hernia. Punctate right adrenal calcification.    BONES/SOFT TISSUES: Spinal degenerative changes. Healed bilateral lower   rib fractures.    IMPRESSION:    Small right pleural effusion.    --- End of Report ---    < end of copied text >    < from: [facility] Duplex Lower Ext Vein Scan, Bilat (02.03.22 @ 11:54) >    FINDINGS: Modified Covid protocol.    RIGHT:  Normal compressibility of the RIGHT common femoral, femoral and popliteal   veins. Theright tibioperoneal trunk is patent.  Doppler examination shows normal spontaneous and phasic flow.  No RIGHT calf vein thrombosis is detected.    LEFT:  Normal compressibility of the LEFT common femoral, femoral and popliteal   veins. The left tibioperoneal trunk is patent.  Doppler examination shows normal spontaneous and phasic flow.  No LEFT calf vein thrombosis is detected.    IMPRESSION:    No evidence of deep venous thrombosis in either proximal lower extremity.      < end of copied text >

## 2022-02-04 NOTE — PROGRESS NOTE ADULT - SUBJECTIVE AND OBJECTIVE BOX
THE PATIENT WAS SEEN AND EXAMINED BY ME WITH THE HOUSESTAFF AND STROKE TEAM DURING MORNING ROUNDS.      HPI: Patient is a 89yo RH F PMHx HTN, HLD, TIA not on ac/ap agents, who presented to ED for change in mental status, slumping over, L facial droop, and possible L hemiparesis. Patient last seen by accompanying family member at 11 AM 2/1/22 with mild confusion. Patient went to sleep and woke up in evening of 2/1/22 asking for help. Was found slumped over, slurred speech and L facial droop. EMS called for concern of stroke. At baseline, able to feed herself, use bathroom herself. Ambulates with walker. Needs assistance with other ADLs.   Patient unable to provide ROS.  NIHSS: 16  preMRS: 3   ICH: 2      SUBJECTIVE: Episode of Atrial Fibrillation last night. No new neurologic complaints.  ROS reported negative unless otherwise noted.    MEDICATIONS  (STANDING):  atorvastatin 40 milliGRAM(s) Oral at bedtime  brimonidine 0.2% Ophthalmic Solution 1 Drop(s) Both EYES two times a day  heparin   Injectable 5000 Unit(s) SubCutaneous every 12 hours  insulin glargine Injectable (LANTUS) 5 Unit(s) SubCutaneous at bedtime  insulin lispro (ADMELOG) corrective regimen sliding scale   SubCutaneous Before meals and at bedtime  lactated ringers. 1000 milliLiter(s) (75 mL/Hr) IV Continuous <Continuous>  latanoprost 0.005% Ophthalmic Solution 1 Drop(s) Both EYES at bedtime  levothyroxine 88 MICROGram(s) Oral daily    MEDICATIONS  (PRN):  acetaminophen     Tablet .. 650 milliGRAM(s) Oral every 6 hours PRN Mild Pain (1 - 3), Moderate Pain (4 - 6)        PHYSICAL EXAM:   Vital Signs Last 24 Hrs  T(C): 36.9 (04 Feb 2022 04:00), Max: 36.9 (03 Feb 2022 12:00)  T(F): 98.4 (04 Feb 2022 04:00), Max: 98.5 (03 Feb 2022 12:00)  HR: 75 (04 Feb 2022 07:00) (57 - 98)  BP: 161/83 (04 Feb 2022 07:00) (141/63 - 170/75)  BP(mean): 116 (04 Feb 2022 07:00) (90 - 138)  RR: 23 (04 Feb 2022 07:00) (13 - 25)  SpO2: 99% (04 Feb 2022 07:00) (93% - 100%)    General: No acute distress  HEENT: EOM intact, visual fields full  Abdomen: Soft, nontender, nondistended   Extremities: No edema    NEUROLOGICAL EXAM:  Mental status: Awake, alert, oriented x age and name but with fluctuations, follows most one stepe commands and generates some short phrases   Cranial Nerves: left facial palsy, no nystagmus, no dysarthria,  tongue midline  Motor exam: Normal tone, no drift, moves all extremities against gravity   Sensation: Intact to light touch   Coordination/ Gait: gait not assessed     LABS:                        9.6    10.23 )-----------( 342      ( 04 Feb 2022 00:51 )             31.1     02-04    137  |  101  |  16  ----------------------------<  162<H>  4.2   |  28  |  1.08    Ca    9.3      04 Feb 2022 00:54  Phos  3.8     02-02  Mg     1.9     02-02    TPro  9.2<H>  /  Alb  4.2  /  TBili  0.4  /  DBili  x   /  AST  17  /  ALT  23  /  AlkPhos  205<H>  02-02    PT/INR - ( 02 Feb 2022 10:54 )   PT: 13.8 sec;   INR: 1.16 ratio    PTT - ( 02 Feb 2022 10:54 )  PTT:33.2 sec        IMAGING: Reviewed by me.   CT Head No Cont (02.03.22 @ 11:41)  Better definition of right superior cerebellar infarct which   appears recent when compared with the prior 2/2/2022. Evolution of what   appears to be a hemorrhagic right parietal territory recent infarct with   mass effect on the atria and posterior body of the right lateral   ventricle. Study is limited by patient positioning with portion of the   left brain not visualized    EEG report 2/3/22  Abnormal EEG study.  Moderate nonspecific diffuse or multifocal cerebral dysfunction.   No epileptiform pattern or seizure seen.    CT HEAD: (02.02.22 @ 03:59)   1. Progression of cytotoxic edema and petechial hemorrhage in the right   parieto-occipital infarct.  2. Age-indeterminate ischemia/infarct in the superior right cerebellum.  CTA BRAIN:  1. Patent intracranial circulation. No large vessel occlusion or   significant stenosis..  CTA NECK:  1. Patent cervical vasculature. Possible severe stenosis at the origin of   the right vertebral artery versus artifact from regional motion.    CT Brain Stroke Protocol (02.02.22 @ 00:42)   1. Hemorrhagic infarct in the right PCA territory.     THE PATIENT WAS SEEN AND EXAMINED BY ME WITH THE HOUSESTAFF AND STROKE TEAM DURING MORNING ROUNDS.      HPI: Patient is a 89yo RH F PMHx HTN, HLD, TIA not on ac/ap agents, who presented to ED for change in mental status, slumping over, L facial droop, and possible L hemiparesis. Patient last seen by accompanying family member at 11 AM 2/1/22 with mild confusion. Patient went to sleep and woke up in evening of 2/1/22 asking for help. Was found slumped over, slurred speech and L facial droop. EMS called for concern of stroke. At baseline, able to feed herself, use bathroom herself. Ambulates with walker. Needs assistance with other ADLs.   Patient unable to provide ROS.  NIHSS: 16  preMRS: 3   ICH: 2      SUBJECTIVE: Episode of Atrial Fibrillation last night. No new neurologic complaints.  ROS reported negative unless otherwise noted.    MEDICATIONS  (STANDING):  atorvastatin 40 milliGRAM(s) Oral at bedtime  brimonidine 0.2% Ophthalmic Solution 1 Drop(s) Both EYES two times a day  heparin   Injectable 5000 Unit(s) SubCutaneous every 12 hours  insulin glargine Injectable (LANTUS) 5 Unit(s) SubCutaneous at bedtime  insulin lispro (ADMELOG) corrective regimen sliding scale   SubCutaneous Before meals and at bedtime  lactated ringers. 1000 milliLiter(s) (75 mL/Hr) IV Continuous <Continuous>  latanoprost 0.005% Ophthalmic Solution 1 Drop(s) Both EYES at bedtime  levothyroxine 88 MICROGram(s) Oral daily    MEDICATIONS  (PRN):  acetaminophen     Tablet .. 650 milliGRAM(s) Oral every 6 hours PRN Mild Pain (1 - 3), Moderate Pain (4 - 6)        PHYSICAL EXAM:   ICU Vital Signs Last 24 Hrs  T(C): 36.9 (04 Feb 2022 04:00), Max: 36.9 (03 Feb 2022 12:00)  T(F): 98.4 (04 Feb 2022 04:00), Max: 98.5 (03 Feb 2022 12:00)  HR: 75 (04 Feb 2022 07:00) (57 - 98)  BP: 161/83 (04 Feb 2022 07:00) (141/63 - 170/75)  BP(mean): 116 (04 Feb 2022 07:00) (90 - 138)  ABP: --  ABP(mean): --  RR: 23 (04 Feb 2022 07:00) (13 - 25)  SpO2: 99% (04 Feb 2022 07:00) (93% - 100%)      General: No acute distress  HEENT: EOM intact, visual fields full  Abdomen: Soft, nontender, nondistended   Extremities: No edema    NEUROLOGICAL EXAM:  Mental status: Awake, alert, oriented x age and name but with fluctuations, follows most one stepe commands and generates some short phrases   Cranial Nerves: left facial palsy, no nystagmus, no dysarthria,  tongue midline  Motor exam: Normal tone, no drift, moves all extremities against gravity   Sensation: Intact to light touch   Coordination/ Gait: gait not assessed     LABS:         CBC Full  -  ( 04 Feb 2022 00:51 )  WBC Count : 10.23 K/uL  RBC Count : 3.74 M/uL  Hemoglobin : 9.6 g/dL  Hematocrit : 31.1 %  Platelet Count - Automated : 342 K/uL  Mean Cell Volume : 83.2 fl  Mean Cell Hemoglobin : 25.7 pg  Mean Cell Hemoglobin Concentration : 30.9 gm/dL  Auto Neutrophil # : x  Auto Lymphocyte # : x  Auto Monocyte # : x  Auto Eosinophil # : x  Auto Basophil # : x  Auto Neutrophil % : x  Auto Lymphocyte % : x  Auto Monocyte % : x  Auto Eosinophil % : x  Auto Basophil % : x      02-04    137  |  101  |  16  ----------------------------<  162<H>  4.2   |  28  |  1.08    Ca    9.3      04 Feb 2022 00:54  Phos  3.8     02-02  Mg     1.9     02-02    TPro  9.2<H>  /  Alb  4.2  /  TBili  0.4  /  DBili  x   /  AST  17  /  ALT  23  /  AlkPhos  205<H>  02-02          IMAGING: Reviewed by me.   CT Head No Cont (02.03.22 @ 11:41)  Better definition of right superior cerebellar infarct which   appears recent when compared with the prior 2/2/2022. Evolution of what   appears to be a hemorrhagic right parietal territory recent infarct with   mass effect on the atria and posterior body of the right lateral   ventricle. Study is limited by patient positioning with portion of the   left brain not visualized    EEG report 2/3/22  Abnormal EEG study.  Moderate nonspecific diffuse or multifocal cerebral dysfunction.   No epileptiform pattern or seizure seen.    CT HEAD: (02.02.22 @ 03:59)   1. Progression of cytotoxic edema and petechial hemorrhage in the right   parieto-occipital infarct.  2. Age-indeterminate ischemia/infarct in the superior right cerebellum.  CTA BRAIN:  1. Patent intracranial circulation. No large vessel occlusion or   significant stenosis..  CTA NECK:  1. Patent cervical vasculature. Possible severe stenosis at the origin of   the right vertebral artery versus artifact from regional motion.    CT Brain Stroke Protocol (02.02.22 @ 00:42)   1. Hemorrhagic infarct in the right PCA territory.

## 2022-02-04 NOTE — PROGRESS NOTE ADULT - ASSESSMENT
89yo RH F PMHx HTN, HLD, ?TIA not on ac/ap agents, who presents to ED for change in mental status, slumping over, L facial droop, dysarthria and possible L hemiparesis. Vs 97.7F, HR 70, 136/61, RR18, 100%RA. CK wnl. LKW: 11 AM 2/1/22  NIHSS:  16 Baseline MRS: 3  Not a tPA candidate due to outside window and hemorrhage noted on CTNot a thrombectomy candidate since no brigitte LVO. CT head: Gyriform hyperdensity in the right parietal and occipital lobes with surrounding cytotoxic edema. No hydrocephalus. No central herniation. Basilar cisterns are clear. Scattered moderate chronic microvascular ischemic change. Unable to obtain CTA/P during code stroke as patient started drooling and spitting up in CT scan and had episode of desaturation. Patient removed from CT scan and stabilized in trauma room by ED staff.   CTH/A repeat 1. Progression of cytotoxic edema and petechial hemorrhage in the right parieto-occipital infarct.2. Age-indeterminate ischemia/infarct in the superior right cerebellum.CTA BRAIN: Patent intracranial circulation. No large vessel occlusion or significant stenosis.CTA NECK: Patent cervical vasculature. Possible severe stenosis at the origin of the right vertebral artery versus artifact from regional motion.    Impression:  R parieto-occipital lobe petechial hemorrhage and possible infarct with age indeterminate infarct in superior R cerebellum. Possible severe stenosis at the origin of the right vertebral artery versus artifact from regional motion.  Mechanism:  Embolic stroke of undetermined source.    NEURO: neurologically without acute change, continue close monitoring for neurologic deterioration given cerebral edema with mass effect and brain compression, avoid hyponatremia, permissive HTN < 160mmHg (goal 120-160mmHg) in setting of petechial hemorrhage, avoid hypotension and rapid fluctuations , LDL 49- continue home dose atorvastatin 40mg PO daily. Patient unable to tolerate MRI, does not need to be done as inpatient. Repeat CT scan performed 2/3 is stable, shows evolution of recent infarct w hemorrhagic transformation. May stop continuous VEEG study- report 2/3 shows generalized slowing without seizures. Repeat Head CT on 2/6, unless dc to Rehab first. Physical therapy/OT: Rehab     ANTITHROMBOTIC THERAPY: Will need to start AC for infarct w Atrial Fibrillation captured on tele monitor. Tentative plan to start Eliquis 5mg PO BID on 2/11, keep on hold until then for hemorrhagic conversion.     PULMONARY: pulmonary follow up appreciated: CXR grossly clear, monitor for hypoxia, trend inflammatory markers, check CT chest, LE duplex  protecting airway, saturating well     CARDIOVASCULAR: check TTE, Patient noted to have Atrial Fibrillation last night 2/4                          SBP goal: 120-160mmhg     GASTROINTESTINAL:  SLP eval for    Puree diet and Mildly Thick Liquids  Will defer objective evaluation at this time. Will f/u for assessment of diet tolerance and ?diet upgrade at bedside vs. objective evaluation of swallow.  crush medication (when feasible); oral hygiene; position upright (90 degrees); small sips/bites; ONLY FEED WHEN FULLY AWAKE/ALERT; slow rate  dependent       RENAL: BUN/Cr without acuate change, maintain adequate hydration, monitor with repeat BMP hyperkalemia- no associated sx reported, good urine output      Na Goal: Greater than 135     Amin: n     HEMATOLOGY: H/H with anemia but no acute change, Platelets 302, Hematology follow up appreciated: MGUS- follows at [facility]     DVT ppx: Heparin s.c [x] LMWH []     ID: afebrile,  leukocytosis , CT chest pending, UA neg , monitor for si/sx of infection     DISPOSITION: Rehab       CORE MEASURES:        Admission NIHSS: 16     TPA: [] YES [x] NO      LDL/HDL:p     Depression Screen: p     Statin Therapy: y      Dysphagia Screen: [x] PASS [] FAIL     Smoking [] YES x[] NO      Afib [] YES [x] NO     Stroke Education [x] YES [] NO    Obtain screening lower extremity venous ultrasound in patients who meet 1 or more of the following criteria as patient is high risk for DVT/PE on admission:   [] History of DVT/PE  [x]Hypercoagulable states (Factor V Leiden, Cancer, OCP, etc. )  [x]Prolonged immobility (hemiplegia/hemiparesis/post operative or any other extended immobilization)  [] Transferred from outside facility (Rehab or Long term care)  [] Age </= to 50

## 2022-02-04 NOTE — PROGRESS NOTE ADULT - SUBJECTIVE AND OBJECTIVE BOX
DATE OF SERVICE: 02-04-22 @ 22:27    Patient is a 89y old  Female who presents with a chief complaint of concern for stroke (04 Feb 2022 13:14)      SUBJECTIVE / OVERNIGHT EVENTS:  No chest pain. No shortness of breath. No complaints. No events overnight.     MEDICATIONS  (STANDING):  atorvastatin 40 milliGRAM(s) Oral at bedtime  brimonidine 0.2% Ophthalmic Solution 1 Drop(s) Both EYES two times a day  dextrose 40% Gel 15 Gram(s) Oral once  dextrose 5%. 1000 milliLiter(s) (50 mL/Hr) IV Continuous <Continuous>  dextrose 5%. 1000 milliLiter(s) (100 mL/Hr) IV Continuous <Continuous>  dextrose 50% Injectable 25 Gram(s) IV Push once  dextrose 50% Injectable 12.5 Gram(s) IV Push once  dextrose 50% Injectable 25 Gram(s) IV Push once  glucagon  Injectable 1 milliGRAM(s) IntraMuscular once  heparin   Injectable 5000 Unit(s) SubCutaneous every 12 hours  insulin glargine Injectable (LANTUS) 5 Unit(s) SubCutaneous at bedtime  insulin lispro (ADMELOG) corrective regimen sliding scale   SubCutaneous Before meals and at bedtime  lactated ringers. 1000 milliLiter(s) (75 mL/Hr) IV Continuous <Continuous>  latanoprost 0.005% Ophthalmic Solution 1 Drop(s) Both EYES at bedtime  levothyroxine 88 MICROGram(s) Oral daily    MEDICATIONS  (PRN):  acetaminophen     Tablet .. 650 milliGRAM(s) Oral every 6 hours PRN Mild Pain (1 - 3), Moderate Pain (4 - 6)      Vital Signs Last 24 Hrs  T(C): 36.6 (04 Feb 2022 20:00), Max: 37.4 (04 Feb 2022 12:00)  T(F): 97.8 (04 Feb 2022 20:00), Max: 99.3 (04 Feb 2022 12:00)  HR: 74 (04 Feb 2022 20:00) (74 - 77)  BP: 164/72 (04 Feb 2022 20:00) (131/62 - 170/75)  BP(mean): 103 (04 Feb 2022 20:00) (89 - 138)  RR: 13 (04 Feb 2022 20:00) (13 - 33)  SpO2: 97% (04 Feb 2022 20:00) (93% - 100%)  CAPILLARY BLOOD GLUCOSE      POCT Blood Glucose.: 134 mg/dL (04 Feb 2022 21:13)  POCT Blood Glucose.: 127 mg/dL (04 Feb 2022 17:18)  POCT Blood Glucose.: 139 mg/dL (04 Feb 2022 12:50)  POCT Blood Glucose.: 153 mg/dL (04 Feb 2022 07:54)  POCT Blood Glucose.: 138 mg/dL (04 Feb 2022 05:55)    I&O's Summary    03 Feb 2022 07:01  -  04 Feb 2022 07:00  --------------------------------------------------------  IN: 1050 mL / OUT: 1101 mL / NET: -51 mL    04 Feb 2022 07:01  -  04 Feb 2022 22:27  --------------------------------------------------------  IN: 1100 mL / OUT: 1000 mL / NET: 100 mL        PHYSICAL EXAM:  GENERAL: NAD, well-developed  HEAD:  Atraumatic, Normocephalic  EYES: EOMI, PERRLA, conjunctiva and sclera clear  NECK: Supple, No JVD  CHEST/LUNG: Clear to auscultation bilaterally; No wheeze  HEART: Regular rate and rhythm; No murmurs, rubs, or gallops  ABDOMEN: Soft, Nontender, Nondistended; Bowel sounds present  EXTREMITIES:  2+ Peripheral Pulses, No clubbing, cyanosis, or edema  PSYCH: AAOx1  NEUROLOGY: non-focal  SKIN: No rashes or lesions    LABS:                        9.6    10.23 )-----------( 342      ( 04 Feb 2022 00:51 )             31.1     02-04    137  |  101  |  16  ----------------------------<  162<H>  4.2   |  28  |  1.08    Ca    9.3      04 Feb 2022 00:54                RADIOLOGY & ADDITIONAL TESTS:    Imaging Personally Reviewed:    Consultant(s) Notes Reviewed:      Care Discussed with Consultants/Other Providers:

## 2022-02-04 NOTE — PROGRESS NOTE ADULT - PROBLEM SELECTOR PLAN 1
COVID PCR + 2/2  -CXR grossly clear  -No recent COVID infection per family, vaccinated x2 doses, did not receive booster   -Reportedly with hypoxia to low 90s on admission, O2 sats 96% on RA when seen   -Would monitor off treatment for now. If O2 requirements increase will consider Dexamethasone  -Mild hypoxia likely 2nd to atelectasis rather than COVID PNA   -Trend inflammatory markers  -DVT ppx  -LE duplex negative for DVT  -CT chest with no clear PNA.

## 2022-02-04 NOTE — EEG REPORT - NS EEG TEXT BOX
[facility]   COMPREHENSIVE EPILEPSY CENTER   REPORT OF LONG-TERM VIDEO EEG     [facility]: [address], [place] [address], Ph#: [phone]  [facility]: [address] [place] [address], Ph#: [phone]  [facility]: [address] [place] [address], Ph#: [phone]    Patient Name: GENARO PATEL  Age and : 89y ([birth year])  MRN #: [id number]  Location: [facility] 5U I519 W1  Referring Physician: Arnol Zacarias    Start Time/Date: 08:00 on 2022  End Time/Date: 10: on 2022  Duration:   _____________________________________________________________  STUDY INFORMATION    EEG Recording Technique:  The patient underwent continuous Video-EEG monitoring, using Telemetry System hardware on the XLTek Digital System. EEG and video data were stored on a computer hard drive with important events saved in digital archive files. The material was reviewed by a physician (electroencephalographer / epileptologist) on a daily basis. Jack and seizure detection algorithms were utilized and reviewed. An EEG Technician attended to the patient, and was available throughout daytime work hours.  The epilepsy center neurologist was available in person or on call 24-hours per day.    EEG Placement and Labeling of Electrodes:  The EEG was performed utilizing 20 channel referential EEG connections (coronal over temporal over parasagittal montage) using all standard 10-20 electrode placements with EKG, with additional electrodes placed in the inferior temporal region using the modified 10-10 montage electrode placements for elective admissions, or if deemed necessary. Recording was at a sampling rate of 256 samples per second per channel. Time synchronized digital video recording was done simultaneously with EEG recording. A low light infrared camera was used for low light recording.   _____________________________________________________________  HISTORY    Patient is a 89y old  Female who presents with a chief complaint of concern for stroke (2022 06:50)  _____________________________________________________________  STUDY INTERPRETATION    Findings: The background was continuous, spontaneously variable and reactive. During wakefulness, the posterior dominant rhythm consisted of fragmentary 8 Hz activity, with amplitude to 30 uV, that attenuated to eye opening.  Low amplitude frontal beta was noted in wakefulness.    Background Slowing:  Diffuse theta and polymorphic delta slowing occasional 1.5-2Hz generalized rhythmic delta (GRDA)    Focal Slowing:   None were present.    Sleep Background:  Drowsiness was characterized by fragmentation, attenuation, and slowing of the background activity.    Stage II sleep transients were not recorded.    Other Non-Epileptiform Findings:  None were present.    Interictal Epileptiform Activity:   none    Events:  Clinical events: None recorded.  Seizures: None recorded.    Activation Procedures:   Hyperventilation was not performed.    Photic stimulation was not performed.     Artifacts:  Intermittent myogenic and movement artifacts were noted.    ECG:  The heart rate on single channel ECG was predominantly between 70-80 BPM.    _____________________________________________________________  EEG SUMMARY/CLASSIFICATION    Abnormal EEG in the awake, drowsy and asleep states.  - Mild to Moderate generalized slowing.    _____________________________________________________________  EEG IMPRESSION/CLINICAL CORRELATE    Abnormal EEG study.  Mild to Moderate nonspecific diffuse or multifocal cerebral dysfunction.   No epileptiform pattern or seizure seen.    Manny Nichole MD PGY-5  Epilepsy Fellow    This Preliminary report is based on fellow review. Final report pending attending review.    Reading Room: [phone]  On Call Service After Hours: [phone] [facility]   COMPREHENSIVE EPILEPSY CENTER   REPORT OF LONG-TERM VIDEO EEG     [facility]: [address], [place] [address], Ph#: [phone]  [facility]: [address] [place] [address], Ph#: [phone]  [facility]: [address] [place] [address], Ph#: [phone]    Patient Name: GENARO PATEL  Age and : 89y (29)  MRN #: [id number]  Location: [facility] 5U I519 W1  Referring Physician: Arnol Zacarias    Start Time/Date: 08:00 on 2022  End Time/Date: 10:26 on 2022  Duration: 02:26h  _____________________________________________________________  STUDY INFORMATION    EEG Recording Technique:  The patient underwent continuous Video-EEG monitoring, using Telemetry System hardware on the XLTek Digital System. EEG and video data were stored on a computer hard drive with important events saved in digital archive files. The material was reviewed by a physician (electroencephalographer / epileptologist) on a daily basis. Jack and seizure detection algorithms were utilized and reviewed. An EEG Technician attended to the patient, and was available throughout daytime work hours.  The epilepsy center neurologist was available in person or on call 24-hours per day.    EEG Placement and Labeling of Electrodes:  The EEG was performed utilizing 20 channel referential EEG connections (coronal over temporal over parasagittal montage) using all standard 10-20 electrode placements with EKG, with additional electrodes placed in the inferior temporal region using the modified 10-10 montage electrode placements for elective admissions, or if deemed necessary. Recording was at a sampling rate of 256 samples per second per channel. Time synchronized digital video recording was done simultaneously with EEG recording. A low light infrared camera was used for low light recording.   _____________________________________________________________  HISTORY    Patient is a 89y old  Female who presents with a chief complaint of concern for stroke (2022 06:50)  _____________________________________________________________  STUDY INTERPRETATION    Findings: The background was continuous, spontaneously variable and reactive. During wakefulness, the posterior dominant rhythm consisted of fragmentary 8 Hz activity, with amplitude to 30 uV, that attenuated to eye opening.  Low amplitude frontal beta was noted in wakefulness.    Background Slowing:  Diffuse theta and polymorphic delta slowing occasional 1.5-2Hz generalized rhythmic delta (GRDA)    Focal Slowing:   None were present.    Sleep Background:  Drowsiness was characterized by fragmentation, attenuation, and slowing of the background activity.    Stage II sleep transients were not recorded.    Other Non-Epileptiform Findings:  None were present.    Interictal Epileptiform Activity:   none    Events:  Clinical events: None recorded.  Seizures: None recorded.    Activation Procedures:   Hyperventilation was not performed.    Photic stimulation was not performed.     Artifacts:  Intermittent myogenic and movement artifacts were noted.    ECG:  The heart rate on single channel ECG was predominantly between 70-80 BPM.    _____________________________________________________________  EEG SUMMARY/CLASSIFICATION    Abnormal EEG in the awake, drowsy and asleep states.  - Mild to Moderate generalized slowing.    _____________________________________________________________  EEG IMPRESSION/CLINICAL CORRELATE    Abnormal EEG study.  Mild to Moderate nonspecific diffuse or multifocal cerebral dysfunction.   No epileptiform pattern or seizure seen.    Manny Nichole MD PGY-5  Epilepsy Fellow      Reading Room: [phone]  On Call Service After Hours: [phone]

## 2022-02-04 NOTE — EEG REPORT - NS EEG TEXT BOX
[facility]   COMPREHENSIVE EPILEPSY CENTER   REPORT OF LONG-TERM VIDEO EEG     [facility]: [address], [place] [address], Ph#: [phone]  [facility]: [address] [facility] [address] [facility] [place] [address], Ph#: [phone]  [facility]: [address] [place] [address], Ph#: [phone]    Patient Name: GENARO PATEL  Age and : 89y (29)  MRN #: [id number]  Location: [facility] 5U I519 W1  Referring Physician: Arnol Zacarias    Start Time/Date: 08:00 on 2022  End Time/Date: 08:00 on 2022  Duration: 23:33:57  _____________________________________________________________  STUDY INFORMATION    EEG Recording Technique:  The patient underwent continuous Video-EEG monitoring, using Telemetry System hardware on the XLTek Digital System. EEG and video data were stored on a computer hard drive with important events saved in digital archive files. The material was reviewed by a physician (electroencephalographer / epileptologist) on a daily basis. Jack and seizure detection algorithms were utilized and reviewed. An EEG Technician attended to the patient, and was available throughout daytime work hours.  The epilepsy center neurologist was available in person or on call 24-hours per day.    EEG Placement and Labeling of Electrodes:  The EEG was performed utilizing 20 channel referential EEG connections (coronal over temporal over parasagittal montage) using all standard 10-20 electrode placements with EKG, with additional electrodes placed in the inferior temporal region using the modified 10-10 montage electrode placements for elective admissions, or if deemed necessary. Recording was at a sampling rate of 256 samples per second per channel. Time synchronized digital video recording was done simultaneously with EEG recording. A low light infrared camera was used for low light recording.   _____________________________________________________________  HISTORY    Patient is a 89y old  Female who presents with a chief complaint of concern for stroke (2022 06:50)  _____________________________________________________________  STUDY INTERPRETATION    Findings: The background was continuous, spontaneously variable and reactive. During wakefulness, the posterior dominant rhythm consisted of fragmentary 8 Hz activity, with amplitude to 30 uV, that attenuated to eye opening.  Low amplitude frontal beta was noted in wakefulness.    Background Slowing:  Diffuse theta and polymorphic delta slowing occasional 1.5-2Hz generalized rhythmic delta (GRDA)    Focal Slowing:   None were present.    Sleep Background:  Drowsiness was characterized by fragmentation, attenuation, and slowing of the background activity.    Stage II sleep transients were not recorded.    Other Non-Epileptiform Findings:  None were present.    Interictal Epileptiform Activity:   none    Events:  Clinical events: None recorded.  Seizures: None recorded.    Activation Procedures:   Hyperventilation was not performed.    Photic stimulation was not performed.     Artifacts:  Intermittent myogenic and movement artifacts were noted.    ECG:  The heart rate on single channel ECG was predominantly between 79-80 BPM.    _____________________________________________________________  EEG SUMMARY/CLASSIFICATION    Abnormal EEG in the awake, drowsy and asleep states.  - Mild to Moderate generalized slowing.    _____________________________________________________________  EEG IMPRESSION/CLINICAL CORRELATE    Abnormal EEG study.  Mild to Moderate nonspecific diffuse or multifocal cerebral dysfunction.   No epileptiform pattern or seizure seen.    Manny Nichole MD PGY-5  Epilepsy Fellow    This Preliminary report is based on fellow review. Final report pending attending review.    Reading Room: [phone]  On Call Service After Hours: [phone]    No seizures x 2 days    In absence of additional clinical concerns, recommend consideration for discontinuation of current EEG study with reconnection in future if warranted.    [General recommendations for CEEG/LTM duration:  1 Day recording if patient awake and no epileptiform abnormalities recorded.  2 Day recording if patient comatose and no epileptiform abnormalities recorded.  2-3 Day recording if patient comatose and epileptiform abnormalities recorded, with no seizures recorded.  Discontinuation of recording if patient with seizures initially on recording, and no subsequent seizures recorded x 1-2 Days.]     [facility]   COMPREHENSIVE EPILEPSY CENTER   REPORT OF LONG-TERM VIDEO EEG     [facility]: [address], [place] [address], Ph#: [phone]  [facility]: [address] [facility] [place] [address], Ph#: [phone]  [facility]: [address] [place] [address], Ph#: [phone]    Patient Name: GENARO PATEL  Age and : 89y (29)  MRN #: [id number]  Location: [facility] 5U I519 W1  Referring Physician: Arnol Zacarias    Start Time/Date: 08:00 on 2022  End Time/Date: 08:00 on 2022  Duration: 23:33:57  _____________________________________________________________  STUDY INFORMATION    EEG Recording Technique:  The patient underwent continuous Video-EEG monitoring, using Telemetry System hardware on the XLTek Digital System. EEG and video data were stored on a computer hard drive with important events saved in digital archive files. The material was reviewed by a physician (electroencephalographer / epileptologist) on a daily basis. Jack and seizure detection algorithms were utilized and reviewed. An EEG Technician attended to the patient, and was available throughout daytime work hours.  The epilepsy center neurologist was available in person or on call 24-hours per day.    EEG Placement and Labeling of Electrodes:  The EEG was performed utilizing 20 channel referential EEG connections (coronal over temporal over parasagittal montage) using all standard 10-20 electrode placements with EKG, with additional electrodes placed in the inferior temporal region using the modified 10-10 montage electrode placements for elective admissions, or if deemed necessary. Recording was at a sampling rate of 256 samples per second per channel. Time synchronized digital video recording was done simultaneously with EEG recording. A low light infrared camera was used for low light recording.   _____________________________________________________________  HISTORY    Patient is a 89y old  Female who presents with a chief complaint of concern for stroke (2022 06:50)  _____________________________________________________________  STUDY INTERPRETATION    Findings: The background was continuous, spontaneously variable and reactive. During wakefulness, the posterior dominant rhythm consisted of fragmentary 8 Hz activity, with amplitude to 30 uV, that attenuated to eye opening.  Low amplitude frontal beta was noted in wakefulness.    Background Slowing:  Diffuse theta and polymorphic delta slowing occasional 1.5-2Hz generalized rhythmic delta (GRDA)    Focal Slowing:   None were present.    Sleep Background:  Drowsiness was characterized by fragmentation, attenuation, and slowing of the background activity.    Stage II sleep transients were not recorded.    Other Non-Epileptiform Findings:  None were present.    Interictal Epileptiform Activity:   none    Events:  Clinical events: None recorded.  Seizures: None recorded.    Activation Procedures:   Hyperventilation was not performed.    Photic stimulation was not performed.     Artifacts:  Intermittent myogenic and movement artifacts were noted.    ECG:  The heart rate on single channel ECG was predominantly between 70-80 BPM.    _____________________________________________________________  EEG SUMMARY/CLASSIFICATION    Abnormal EEG in the awake, drowsy and asleep states.  - Mild to Moderate generalized slowing.    _____________________________________________________________  EEG IMPRESSION/CLINICAL CORRELATE    Abnormal EEG study.  Mild to Moderate nonspecific diffuse or multifocal cerebral dysfunction.   No epileptiform pattern or seizure seen.    No seizures x 2 days    In absence of additional clinical concerns, recommend consideration for discontinuation of current EEG study with reconnection in future if warranted.    [General recommendations for CEEG/LTM duration:  1 Day recording if patient awake and no epileptiform abnormalities recorded.  2 Day recording if patient comatose and no epileptiform abnormalities recorded.  2-3 Day recording if patient comatose and epileptiform abnormalities recorded, with no seizures recorded.  Discontinuation of recording if patient with seizures initially on recording, and no subsequent seizures recorded x 1-2 Days.]    Manny Nichole MD PGY-5  Epilepsy Fellow

## 2022-02-05 LAB
ANION GAP SERPL CALC-SCNC: 13 MMOL/L — SIGNIFICANT CHANGE UP (ref 5–17)
BUN SERPL-MCNC: 17 MG/DL — SIGNIFICANT CHANGE UP (ref 7–23)
CALCIUM SERPL-MCNC: 9.4 MG/DL — SIGNIFICANT CHANGE UP (ref 8.4–10.5)
CHLORIDE SERPL-SCNC: 102 MMOL/L — SIGNIFICANT CHANGE UP (ref 96–108)
CO2 SERPL-SCNC: 24 MMOL/L — SIGNIFICANT CHANGE UP (ref 22–31)
CREAT SERPL-MCNC: 1.14 MG/DL — SIGNIFICANT CHANGE UP (ref 0.5–1.3)
GLUCOSE BLDC GLUCOMTR-MCNC: 102 MG/DL — HIGH (ref 70–99)
GLUCOSE BLDC GLUCOMTR-MCNC: 146 MG/DL — HIGH (ref 70–99)
GLUCOSE BLDC GLUCOMTR-MCNC: 148 MG/DL — HIGH (ref 70–99)
GLUCOSE BLDC GLUCOMTR-MCNC: 207 MG/DL — HIGH (ref 70–99)
GLUCOSE SERPL-MCNC: 177 MG/DL — HIGH (ref 70–99)
HCT VFR BLD CALC: 34.6 % — SIGNIFICANT CHANGE UP (ref 34.5–45)
HGB BLD-MCNC: 10.8 G/DL — LOW (ref 11.5–15.5)
MCHC RBC-ENTMCNC: 26.1 PG — LOW (ref 27–34)
MCHC RBC-ENTMCNC: 31.2 GM/DL — LOW (ref 32–36)
MCV RBC AUTO: 83.6 FL — SIGNIFICANT CHANGE UP (ref 80–100)
NRBC # BLD: 0 /100 WBCS — SIGNIFICANT CHANGE UP (ref 0–0)
PLATELET # BLD AUTO: 350 K/UL — SIGNIFICANT CHANGE UP (ref 150–400)
POTASSIUM SERPL-MCNC: 3.9 MMOL/L — SIGNIFICANT CHANGE UP (ref 3.5–5.3)
POTASSIUM SERPL-SCNC: 3.9 MMOL/L — SIGNIFICANT CHANGE UP (ref 3.5–5.3)
RBC # BLD: 4.14 M/UL — SIGNIFICANT CHANGE UP (ref 3.8–5.2)
RBC # FLD: 14 % — SIGNIFICANT CHANGE UP (ref 10.3–14.5)
SODIUM SERPL-SCNC: 139 MMOL/L — SIGNIFICANT CHANGE UP (ref 135–145)
WBC # BLD: 9.06 K/UL — SIGNIFICANT CHANGE UP (ref 3.8–10.5)
WBC # FLD AUTO: 9.06 K/UL — SIGNIFICANT CHANGE UP (ref 3.8–10.5)

## 2022-02-05 RX ADMIN — BRIMONIDINE TARTRATE 1 DROP(S): 2 SOLUTION/ DROPS OPHTHALMIC at 17:21

## 2022-02-05 RX ADMIN — Medication 2: at 17:21

## 2022-02-05 RX ADMIN — HEPARIN SODIUM 5000 UNIT(S): 5000 INJECTION INTRAVENOUS; SUBCUTANEOUS at 17:20

## 2022-02-05 RX ADMIN — SODIUM CHLORIDE 75 MILLILITER(S): 9 INJECTION, SOLUTION INTRAVENOUS at 21:17

## 2022-02-05 RX ADMIN — HEPARIN SODIUM 5000 UNIT(S): 5000 INJECTION INTRAVENOUS; SUBCUTANEOUS at 05:51

## 2022-02-05 RX ADMIN — AMLODIPINE BESYLATE 10 MILLIGRAM(S): 2.5 TABLET ORAL at 05:51

## 2022-02-05 RX ADMIN — LATANOPROST 1 DROP(S): 0.05 SOLUTION/ DROPS OPHTHALMIC; TOPICAL at 21:18

## 2022-02-05 RX ADMIN — INSULIN GLARGINE 5 UNIT(S): 100 INJECTION, SOLUTION SUBCUTANEOUS at 21:17

## 2022-02-05 RX ADMIN — ATORVASTATIN CALCIUM 40 MILLIGRAM(S): 80 TABLET, FILM COATED ORAL at 21:17

## 2022-02-05 RX ADMIN — Medication 88 MICROGRAM(S): at 05:51

## 2022-02-05 RX ADMIN — SODIUM CHLORIDE 75 MILLILITER(S): 9 INJECTION, SOLUTION INTRAVENOUS at 08:13

## 2022-02-05 RX ADMIN — SODIUM CHLORIDE 75 MILLILITER(S): 9 INJECTION, SOLUTION INTRAVENOUS at 18:55

## 2022-02-05 RX ADMIN — BRIMONIDINE TARTRATE 1 DROP(S): 2 SOLUTION/ DROPS OPHTHALMIC at 05:52

## 2022-02-05 NOTE — PROGRESS NOTE ADULT - PROBLEM SELECTOR PLAN 1
R parieto-occipital lobe petechial hemorrhage and possible infarct with age indeterminate in superior R cerebellum  frequent neuro checks   for repeat head CT tomorrow  s/p vEEG  aspiration precautions  orders per neuro team

## 2022-02-05 NOTE — PROGRESS NOTE ADULT - SUBJECTIVE AND OBJECTIVE BOX
Subjective: Patient seen and examined. No new events except as noted.   remains in ICU     REVIEW OF SYSTEMS:    CONSTITUTIONAL: + weakness, fevers or chills  EYES/ENT: No visual changes;  No vertigo or throat pain   NECK: No pain or stiffness  RESPIRATORY: No cough, wheezing, hemoptysis; No shortness of breath  CARDIOVASCULAR: No chest pain or palpitations  GASTROINTESTINAL: No abdominal or epigastric pain. No nausea, vomiting, or hematemesis; No diarrhea or constipation. No melena or hematochezia.  GENITOURINARY: No dysuria, frequency or hematuria  NEUROLOGICAL: No numbness or weakness  SKIN: No itching, burning, rashes, or lesions   All other review of systems is negative unless indicated above.    MEDICATIONS:  MEDICATIONS  (STANDING):  amLODIPine   Tablet 10 milliGRAM(s) Oral daily  atorvastatin 40 milliGRAM(s) Oral at bedtime  brimonidine 0.2% Ophthalmic Solution 1 Drop(s) Both EYES two times a day  dextrose 40% Gel 15 Gram(s) Oral once  dextrose 5%. 1000 milliLiter(s) (50 mL/Hr) IV Continuous <Continuous>  dextrose 5%. 1000 milliLiter(s) (100 mL/Hr) IV Continuous <Continuous>  dextrose 50% Injectable 25 Gram(s) IV Push once  dextrose 50% Injectable 12.5 Gram(s) IV Push once  dextrose 50% Injectable 25 Gram(s) IV Push once  glucagon  Injectable 1 milliGRAM(s) IntraMuscular once  heparin   Injectable 5000 Unit(s) SubCutaneous every 12 hours  insulin glargine Injectable (LANTUS) 5 Unit(s) SubCutaneous at bedtime  insulin lispro (ADMELOG) corrective regimen sliding scale   SubCutaneous Before meals and at bedtime  lactated ringers. 1000 milliLiter(s) (75 mL/Hr) IV Continuous <Continuous>  latanoprost 0.005% Ophthalmic Solution 1 Drop(s) Both EYES at bedtime  levothyroxine 88 MICROGram(s) Oral daily      PHYSICAL EXAM:  T(C): 36.9 (02-05-22 @ 04:00), Max: 37.4 (02-04-22 @ 12:00)  HR: 77 (02-05-22 @ 04:00) (74 - 77)  BP: 164/72 (02-05-22 @ 04:00) (131/62 - 164/72)  RR: 19 (02-05-22 @ 04:00) (13 - 33)  SpO2: 96% (02-05-22 @ 04:00) (93% - 100%)  Wt(kg): --  I&O's Summary    04 Feb 2022 07:01  -  05 Feb 2022 07:00  --------------------------------------------------------  IN: 1900 mL / OUT: 1800 mL / NET: 100 mL    Appearance: NAD	  HEENT: Normal oral mucosa, PERRL, EOMI	  Lymphatic: No lymphadenopathy , no edema  Cardiovascular: Normal S1 S2, No JVD, No murmurs , Peripheral pulses palpable 2+ bilaterally  Respiratory: Lungs clear to auscultation, normal effort 	  Gastrointestinal:  Soft, Non-tender, + BS	  Skin: No rashes, No ecchymoses, No cyanosis, warm to touch  Musculoskeletal: Normal range of motion, normal strength  Psychiatry:  A&Ox2, following commands on lower extremities   Ext: No edema    LABS:    CARDIAC MARKERS:                        10.8   9.06  )-----------( 350      ( 05 Feb 2022 00:44 )             34.6     02-05    139  |  102  |  17  ----------------------------<  177<H>  3.9   |  24  |  1.14    Ca    9.4      05 Feb 2022 00:44      proBNP:   Lipid Profile:   HgA1c:   TSH:     TELEMETRY: Afib 80s	    ECG:  	  RADIOLOGY:   DIAGNOSTIC TESTING:  [ ] Echocardiogram:  [ ]  Catheterization:  [ ] Stress Test:    OTHER:

## 2022-02-05 NOTE — PROGRESS NOTE ADULT - ASSESSMENT
Patient is a 89yo RH F PMHx HTN, HLD, ?TIA not on ac/ap agents, who presents to ED for change in mental status, slumping over, L facial droop, dysarthria and possible L hemiparesis.    CVA  - workup as per neurology    COVID 19 pna  - pt is not hypoxic  - keep O2 saturation above 92%  - follow informatory markers  - ct chest done  - pulm is following    elevated d dimer  - mason lower ext doppler negative for dvt    dysphagia  - swallow eval  - Puree diet and Mildly Thick Liquids    dvt px  - sq heparin

## 2022-02-05 NOTE — PROGRESS NOTE ADULT - ASSESSMENT
89yo RH F PMHx HTN, HLD, ?TIA not on ac/ap agents, who presents to ED for change in mental status, slumping over, L facial droop, dysarthria and possible L hemiparesis. Vs 97.7F, HR 70, 136/61, RR18, 100%RA. CK wnl. LKW: 11 AM 2/1/22. Not a tPA candidate due to outside window and hemorrhage noted on CT. Not a thrombectomy candidate since no brigitte LVO. CT head: Gyriform hyperdensity in the right parietal and occipital lobes with surrounding cytotoxic edema. Unable to obtain CTA/P during code stroke as patient started drooling and spitting up in CT scan and had episode of desaturation. Patient removed from CT scan and stabilized in trauma room by ED staff.  Repeat CTH/A shows Progression of cytotoxic edema and petechial hemorrhage in the right parieto-occipital infarct. Age-indeterminate ischemia/infarct in the superior right cerebellum. CTA BRAIN: Patent intracranial circulation. No large vessel occlusion or significant stenosis. CTA NECK: Patent cervical vasculature. Possible severe stenosis at the origin of the right vertebral artery versus artifact from regional motion.    Impression:  R parieto-occipital lobe petechial hemorrhage and possible infarct with age indeterminate infarct in superior R cerebellum. Possible severe stenosis at the origin of the right vertebral artery versus artifact from regional motion. Mechanism:  Embolic stroke due to new onset Afib vs hypercoagulable state in the setting of covid-19 infection.     NEURO: neurologically without acute change, continue close monitoring for neurologic deterioration given cerebral edema with mass effect and brain compression, avoid hyponatremia, SBP< 160mmHg (goal 120-160mmHg) in setting of petechial hemorrhage, avoid hypotension and rapid fluctuations , LDL 49- continue home dose atorvastatin 40mg PO daily. Patient unable to tolerate MRI, does not need to be done as inpatient. Repeat CT scan performed 2/3 is stable, shows evolution of recent infarct w hemorrhagic transformation. VEEG (2/3) shows generalized slowing without seizures. Repeat Head CT on 2/6 prior to starting AC for afib.  Physical therapy/OT: [facility]     ANTITHROMBOTIC THERAPY: Plan to start AC on 02/06/22 if repeat head CT on 02/06/22 is stable, for Atrial Fibrillation.     PULMONARY:  CT chest (02/03) Small right pleural effusion, monitor for hypoxia, prior mild hypoxia likely 2nd to atelectasis,  protecting airway, saturating well on room air. Pulmonary follow up appreciated.       CARDIOVASCULAR: check TTE, Patient noted to have Atrial Fibrillation on telemonitor on 2/4/22. Dr Keys (cardio) consult appreciated                          SBP goal: 120-160mmhg     GASTROINTESTINAL:  SLP eval with recommendations for Puree diet and Mildly Thick Liquids, tolerating well      RENAL: BUN/Cr without acuate change, maintain adequate hydration,  good urine output      Na Goal: Greater than 135     Amin: No     HEMATOLOGY: H/H with anemia but no acute change, Platelets 350, D-Dimer (02/04/22): 522. LE doppler (02/03): No evidence of deep venous thrombosis in either proximal lower extremity. Hematology follow up appreciated: [facility]- follows at [facility]     DVT ppx: Heparin s.c      ID: afebrile, no leukocytosis, UA neg , monitor for si/sx of infection     Other: Dr Sharpe (medicine) f/u appreciated. PLan/golas of care discussed over the phone with pt's daughter    DISPOSITION: [facility] as per PT/OT eval once medical work up is completed       CORE MEASURES:        Admission NIHSS: 16     TPA: [] YES [x] NO      LDL/HDL:p     Depression Screen: 0     Statin Therapy: y      Dysphagia Screen: [x] PASS [] FAIL     Smoking [] YES x[] NO      Afib [] YES [x] NO     Stroke Education [x] YES [] NO    Obtain screening lower extremity venous ultrasound in patients who meet 1 or more of the following criteria as patient is high risk for DVT/PE on admission:   [] History of DVT/PE  [x]Hypercoagulable states (Factor V Leiden, Cancer, OCP, etc. )  [x]Prolonged immobility (hemiplegia/hemiparesis/post operative or any other extended immobilization)  [] Transferred from outside facility (Rehab or Long term care)  [] Age </= to 50  89yo RH F PMHx HTN, HLD, ?TIA not on ac/ap agents, who presents to ED for change in mental status, slumping over, L facial droop, dysarthria and possible L hemiparesis. Vs 97.7F, HR 70, 136/61, RR18, 100%RA. CK wnl. LKW: 11 AM 2/1/22. Not a tPA candidate due to outside window and hemorrhage noted on CT. Not a thrombectomy candidate since no brigitte LVO. CT head: Gyriform hyperdensity in the right parietal and occipital lobes with surrounding cytotoxic edema. Unable to obtain CTA/P during code stroke as patient started drooling and spitting up in CT scan and had episode of desaturation. Patient removed from CT scan and stabilized in trauma room by ED staff.  Repeat CTH/A shows Progression of cytotoxic edema and petechial hemorrhage in the right parieto-occipital infarct. Age-indeterminate ischemia/infarct in the superior right cerebellum. CTA BRAIN: Patent intracranial circulation. No large vessel occlusion or significant stenosis. CTA NECK: Patent cervical vasculature. Possible severe stenosis at the origin of the right vertebral artery versus artifact from regional motion.    Impression:  R parieto-occipital lobe petechial hemorrhage and possible infarct with age indeterminate infarct in superior R cerebellum. Possible severe stenosis at the origin of the right vertebral artery versus artifact from regional motion. Mechanism:  Embolic stroke due to new onset Afib vs hypercoagulable state in the setting of covid-19 infection.     NEURO: neurologically without acute change, continue close monitoring for neurologic deterioration given cerebral edema with mass effect and brain compression, avoid hyponatremia, SBP< 160mmHg (goal 120-160mmHg) in setting of petechial hemorrhage, avoid hypotension and rapid fluctuations , LDL 49- continue home dose atorvastatin 40mg PO daily. Patient unable to tolerate MRI, does not need to be done as inpatient. Repeat CT scan performed 2/3 is stable, shows evolution of recent infarct w hemorrhagic transformation. VEEG (2/3) shows generalized slowing without seizures. Repeat Head CT on 2/6 prior to starting AC for afib.  Physical therapy/OT: [facility]     ANTITHROMBOTIC THERAPY: Plan to start AC on 02/06/22 if repeat head CT on 02/06/22 is stable, for Atrial Fibrillation.     PULMONARY:  CT chest (02/03) Small right pleural effusion, monitor for hypoxia, prior mild hypoxia likely 2nd to atelectasis,  protecting airway, saturating well on room air. Pulmonary follow up appreciated.       CARDIOVASCULAR: check TTE, Patient noted to have Atrial Fibrillation on telemonitor on 2/4/22. Dr Keys (cardio) consult appreciated                          SBP goal: 120-160mmhg     GASTROINTESTINAL:  SLP eval with recommendations for Puree diet and Mildly Thick Liquids, tolerating well      RENAL: BUN/Cr without acuate change, maintain adequate hydration,  good urine output      Na Goal: Greater than 135     Amin: No      HEMATOLOGY: H/H with anemia but no acute change, Platelets 350, D-Dimer (02/04/22): 522. LE doppler (02/03): No evidence of deep venous thrombosis in either proximal lower extremity. Hematology follow up appreciated: [facility]US- follows at [facility]     DVT ppx: Heparin s.c      ID: afebrile, no leukocytosis, UA neg , monitor for si/sx of infection     Other: Dr Sharpe (medicine) f/u appreciated. PLan/golas of care discussed over the phone with pt's daughter    DISPOSITION: [facility] as per PT/OT eval once medical work up is completed       CORE MEASURES:        Admission NIHSS: 16     TPA: [] YES [x] NO      LDL/HDL:p     Depression Screen: 0     Statin Therapy: y      Dysphagia Screen: [x] PASS [] FAIL     Smoking [] YES x[] NO      Afib [] YES [x] NO     Stroke Education [x] YES [] NO    Obtain screening lower extremity venous ultrasound in patients who meet 1 or more of the following criteria as patient is high risk for DVT/PE on admission:   [] History of DVT/PE  [x]Hypercoagulable states (Factor V Leiden, Cancer, OCP, etc. )  [x]Prolonged immobility (hemiplegia/hemiparesis/post operative or any other extended immobilization)  [] Transferred from outside facility (Rehab or Long term care)  [] Age </= to 50

## 2022-02-05 NOTE — PROGRESS NOTE ADULT - SUBJECTIVE AND OBJECTIVE BOX
DATE OF SERVICE: 02-05-22 @ 13:21    Patient is a 89y old  Female who presents with a chief complaint of concern for stroke (05 Feb 2022 08:33)      SUBJECTIVE / OVERNIGHT EVENTS:  No chest pain. No shortness of breath. No complaints. No events overnight.     MEDICATIONS  (STANDING):  amLODIPine   Tablet 10 milliGRAM(s) Oral daily  atorvastatin 40 milliGRAM(s) Oral at bedtime  brimonidine 0.2% Ophthalmic Solution 1 Drop(s) Both EYES two times a day  dextrose 40% Gel 15 Gram(s) Oral once  dextrose 5%. 1000 milliLiter(s) (50 mL/Hr) IV Continuous <Continuous>  dextrose 5%. 1000 milliLiter(s) (100 mL/Hr) IV Continuous <Continuous>  dextrose 50% Injectable 25 Gram(s) IV Push once  dextrose 50% Injectable 12.5 Gram(s) IV Push once  dextrose 50% Injectable 25 Gram(s) IV Push once  glucagon  Injectable 1 milliGRAM(s) IntraMuscular once  heparin   Injectable 5000 Unit(s) SubCutaneous every 12 hours  insulin glargine Injectable (LANTUS) 5 Unit(s) SubCutaneous at bedtime  insulin lispro (ADMELOG) corrective regimen sliding scale   SubCutaneous Before meals and at bedtime  lactated ringers. 1000 milliLiter(s) (75 mL/Hr) IV Continuous <Continuous>  latanoprost 0.005% Ophthalmic Solution 1 Drop(s) Both EYES at bedtime  levothyroxine 88 MICROGram(s) Oral daily  LORazepam     Tablet 1 milliGRAM(s) Oral once    MEDICATIONS  (PRN):  acetaminophen     Tablet .. 650 milliGRAM(s) Oral every 6 hours PRN Mild Pain (1 - 3), Moderate Pain (4 - 6)      Vital Signs Last 24 Hrs  T(C): 37 (05 Feb 2022 12:00), Max: 37.1 (04 Feb 2022 16:00)  T(F): 98.6 (05 Feb 2022 12:00), Max: 98.8 (04 Feb 2022 16:00)  HR: 75 (05 Feb 2022 12:00) (74 - 95)  BP: 155/67 (05 Feb 2022 12:00) (131/62 - 164/72)  BP(mean): 96 (05 Feb 2022 12:00) (89 - 103)  RR: 21 (05 Feb 2022 12:00) (13 - 43)  SpO2: 93% (05 Feb 2022 12:00) (93% - 97%)  CAPILLARY BLOOD GLUCOSE      POCT Blood Glucose.: 146 mg/dL (05 Feb 2022 12:08)  POCT Blood Glucose.: 148 mg/dL (05 Feb 2022 08:15)  POCT Blood Glucose.: 134 mg/dL (04 Feb 2022 21:13)  POCT Blood Glucose.: 127 mg/dL (04 Feb 2022 17:18)    I&O's Summary    04 Feb 2022 07:01  -  05 Feb 2022 07:00  --------------------------------------------------------  IN: 1900 mL / OUT: 1800 mL / NET: 100 mL    05 Feb 2022 07:01  -  05 Feb 2022 13:21  --------------------------------------------------------  IN: 675 mL / OUT: 0 mL / NET: 675 mL        PHYSICAL EXAM:  GENERAL: NAD, well-developed  HEAD:  Atraumatic, Normocephalic  EYES: EOMI, PERRLA, conjunctiva and sclera clear  NECK: Supple, No JVD  CHEST/LUNG: Clear to auscultation bilaterally; No wheeze  HEART: Regular rate and rhythm; No murmurs, rubs, or gallops  ABDOMEN: Soft, Nontender, Nondistended; Bowel sounds present  EXTREMITIES:  2+ Peripheral Pulses, No clubbing, cyanosis, or edema  PSYCH: AAOx1  NEUROLOGY: non-focal  SKIN: No rashes or lesions    LABS:                        10.8   9.06  )-----------( 350      ( 05 Feb 2022 00:44 )             34.6     02-05    139  |  102  |  17  ----------------------------<  177<H>  3.9   |  24  |  1.14    Ca    9.4      05 Feb 2022 00:44    < from: CT Chest No Cont (02.03.22 @ 11:44) >  PROCEDURE:  CT scan of the chest was obtained without intravenous contrast.    FINDINGS:    LYMPH NODES: No lymphadenopathy.    HEART/VASCULATURE: Cardiomegaly. Coronary artery and mitral annular   calcifications. There is no pericardial effusion. Aortic calcifications.    AIRWAYS/LUNGS/PLEURA: Patent central airways. Minimal bibasilar linear   atelectasis. The lungs are otherwise clear. There is a small right   pleural effusion.    UPPER ABDOMEN: 2.9 cm left upper pole renal partially imaged cyst. Tiny   hiatal hernia. Punctate right adrenal calcification.    BONES/SOFT TISSUES: Spinal degenerative changes. Healed bilateral lower   rib fractures.    IMPRESSION:    Small right pleural effusion.    < end of copied text >              RADIOLOGY & ADDITIONAL TESTS:    Imaging Personally Reviewed:    Consultant(s) Notes Reviewed:      Care Discussed with Consultants/Other Providers:

## 2022-02-05 NOTE — PROGRESS NOTE ADULT - SUBJECTIVE AND OBJECTIVE BOX
THE PATIENT WAS SEEN AND EXAMINED BY ME WITH THE HOUSESTAFF AND STROKE TEAM DURING MORNING ROUNDS.   HPI:  89yo RH F PMHx HTN, HLD, TIA not on ac/ap agents, who presented to ED for change in mental status, slumping over, L facial droop, and possible L hemiparesis. Patient last seen by accompanying family member at 11 AM 2/1/22 with mild confusion. Patient went to sleep and woke up in evening of 2/1/22 asking for help. Was found slumped over, slurred speech and L facial droop. EMS called for concern of stroke. At baseline, able to feed herself, use bathroom herself. Ambulates with walker. Needs assistance with other ADLs. Patient unable to provide ROS. NIHSS: 16, preMRS: 3, ICH: 2      SUBJECTIVE: No events overnight.  No new neurologic complaints, ROS reported negative unless otherwise noted.      acetaminophen     Tablet .. 650 milliGRAM(s) Oral every 6 hours PRN  amLODIPine   Tablet 10 milliGRAM(s) Oral daily  atorvastatin 40 milliGRAM(s) Oral at bedtime  brimonidine 0.2% Ophthalmic Solution 1 Drop(s) Both EYES two times a day  dextrose 40% Gel 15 Gram(s) Oral once  dextrose 5%. 1000 milliLiter(s) IV Continuous <Continuous>  dextrose 5%. 1000 milliLiter(s) IV Continuous <Continuous>  dextrose 50% Injectable 25 Gram(s) IV Push once  dextrose 50% Injectable 12.5 Gram(s) IV Push once  dextrose 50% Injectable 25 Gram(s) IV Push once  glucagon  Injectable 1 milliGRAM(s) IntraMuscular once  heparin   Injectable 5000 Unit(s) SubCutaneous every 12 hours  insulin glargine Injectable (LANTUS) 5 Unit(s) SubCutaneous at bedtime  insulin lispro (ADMELOG) corrective regimen sliding scale   SubCutaneous Before meals and at bedtime  lactated ringers. 1000 milliLiter(s) IV Continuous <Continuous>  latanoprost 0.005% Ophthalmic Solution 1 Drop(s) Both EYES at bedtime  levothyroxine 88 MICROGram(s) Oral daily      PHYSICAL EXAM:   Vital Signs Last 24 Hrs  T(C): 36.9 (05 Feb 2022 04:00), Max: 37.4 (04 Feb 2022 12:00)  T(F): 98.4 (05 Feb 2022 04:00), Max: 99.3 (04 Feb 2022 12:00)  HR: 77 (05 Feb 2022 04:00) (74 - 77)  BP: 164/72 (05 Feb 2022 04:00) (131/62 - 164/72)  BP(mean): 103 (05 Feb 2022 04:00) (89 - 106)  RR: 19 (05 Feb 2022 04:00) (13 - 33)  SpO2: 96% (05 Feb 2022 04:00) (93% - 100%)    General: No acute distress  HEENT: EOM intact, visual fields full  Abdomen: Soft, nontender, nondistended   Extremities: No edema    NEUROLOGICAL EXAM:  Mental status: Eyes open, awake, alert, oriented to name states 94-95 for age, generates some short phrases, follows most one step commands   Cranial Nerves: Mild left facial palsy, no nystagmus, no dysarthria,  tongue midline  Motor exam: Normal tone, no drift, RUE/RLE 4/5, LUE/LLE 4/5.    Sensation: Intact to light touch   Coordination/ Gait: gait not assessed    LABS:                        10.8   9.06  )-----------( 350      ( 05 Feb 2022 00:44 )             34.6    02-05    139  |  102  |  17  ----------------------------<  177<H>  3.9   |  24  |  1.14    Ca    9.4      05 Feb 2022 00:44          IMAGING: Reviewed by me.     CT Head No Cont (02.03.22 @ 11:41) Better definition of right superior cerebellar infarct which appears recent when compared with the prior 2/2/2022. Evolution of what appears to be a hemorrhagic right parietal territory recent infarct with mass effect on the atria and posterior body of the right lateral ventricle. Study is limited by patient positioning with portion of the left brain not visualized    EEG report 2/3/22 Abnormal EEG study. Mild to moderate nonspecific diffuse or multifocal cerebral dysfunction.  No epileptiform pattern or seizure seen.    CT HEAD: (02.02.22 @ 03:59)  Progression of cytotoxic edema and petechial hemorrhage in the right parieto-occipital infarct.. Age-indeterminate ischemia/infarct in the superior right cerebellum.    CTA BRAIN:  Patent intracranial circulation. No large vessel occlusion or significant stenosis..    CTA NECK: Patent cervical vasculature. Possible severe stenosis at the origin of  the right vertebral artery versus artifact from regional motion.    CT Brain Stroke Protocol (02.02.22 @ 00:42)  Hemorrhagic infarct in the right PCA territory.

## 2022-02-06 LAB
ANION GAP SERPL CALC-SCNC: 11 MMOL/L — SIGNIFICANT CHANGE UP (ref 5–17)
BUN SERPL-MCNC: 18 MG/DL — SIGNIFICANT CHANGE UP (ref 7–23)
CALCIUM SERPL-MCNC: 8.8 MG/DL — SIGNIFICANT CHANGE UP (ref 8.4–10.5)
CHLORIDE SERPL-SCNC: 102 MMOL/L — SIGNIFICANT CHANGE UP (ref 96–108)
CO2 SERPL-SCNC: 25 MMOL/L — SIGNIFICANT CHANGE UP (ref 22–31)
CREAT SERPL-MCNC: 1.38 MG/DL — HIGH (ref 0.5–1.3)
GLUCOSE BLDC GLUCOMTR-MCNC: 112 MG/DL — HIGH (ref 70–99)
GLUCOSE BLDC GLUCOMTR-MCNC: 127 MG/DL — HIGH (ref 70–99)
GLUCOSE BLDC GLUCOMTR-MCNC: 161 MG/DL — HIGH (ref 70–99)
GLUCOSE BLDC GLUCOMTR-MCNC: 93 MG/DL — SIGNIFICANT CHANGE UP (ref 70–99)
GLUCOSE SERPL-MCNC: 134 MG/DL — HIGH (ref 70–99)
HCT VFR BLD CALC: 32.7 % — LOW (ref 34.5–45)
HGB BLD-MCNC: 10.1 G/DL — LOW (ref 11.5–15.5)
MCHC RBC-ENTMCNC: 25.6 PG — LOW (ref 27–34)
MCHC RBC-ENTMCNC: 30.9 GM/DL — LOW (ref 32–36)
MCV RBC AUTO: 83 FL — SIGNIFICANT CHANGE UP (ref 80–100)
NRBC # BLD: 0 /100 WBCS — SIGNIFICANT CHANGE UP (ref 0–0)
PLATELET # BLD AUTO: 308 K/UL — SIGNIFICANT CHANGE UP (ref 150–400)
POTASSIUM SERPL-MCNC: 4.1 MMOL/L — SIGNIFICANT CHANGE UP (ref 3.5–5.3)
POTASSIUM SERPL-SCNC: 4.1 MMOL/L — SIGNIFICANT CHANGE UP (ref 3.5–5.3)
RBC # BLD: 3.94 M/UL — SIGNIFICANT CHANGE UP (ref 3.8–5.2)
RBC # FLD: 13.8 % — SIGNIFICANT CHANGE UP (ref 10.3–14.5)
SODIUM SERPL-SCNC: 138 MMOL/L — SIGNIFICANT CHANGE UP (ref 135–145)
WBC # BLD: 8.32 K/UL — SIGNIFICANT CHANGE UP (ref 3.8–10.5)
WBC # FLD AUTO: 8.32 K/UL — SIGNIFICANT CHANGE UP (ref 3.8–10.5)

## 2022-02-06 PROCEDURE — 70450 CT HEAD/BRAIN W/O DYE: CPT | Mod: 26

## 2022-02-06 RX ORDER — ASPIRIN/CALCIUM CARB/MAGNESIUM 324 MG
81 TABLET ORAL DAILY
Refills: 0 | Status: DISCONTINUED | OUTPATIENT
Start: 2022-02-06 | End: 2022-02-10

## 2022-02-06 RX ADMIN — AMLODIPINE BESYLATE 10 MILLIGRAM(S): 2.5 TABLET ORAL at 06:14

## 2022-02-06 RX ADMIN — SODIUM CHLORIDE 75 MILLILITER(S): 9 INJECTION, SOLUTION INTRAVENOUS at 12:33

## 2022-02-06 RX ADMIN — Medication 1 MILLIGRAM(S): at 09:25

## 2022-02-06 RX ADMIN — HEPARIN SODIUM 5000 UNIT(S): 5000 INJECTION INTRAVENOUS; SUBCUTANEOUS at 19:09

## 2022-02-06 RX ADMIN — LATANOPROST 1 DROP(S): 0.05 SOLUTION/ DROPS OPHTHALMIC; TOPICAL at 21:41

## 2022-02-06 RX ADMIN — ATORVASTATIN CALCIUM 40 MILLIGRAM(S): 80 TABLET, FILM COATED ORAL at 21:40

## 2022-02-06 RX ADMIN — INSULIN GLARGINE 5 UNIT(S): 100 INJECTION, SOLUTION SUBCUTANEOUS at 22:53

## 2022-02-06 RX ADMIN — Medication 88 MICROGRAM(S): at 06:14

## 2022-02-06 RX ADMIN — BRIMONIDINE TARTRATE 1 DROP(S): 2 SOLUTION/ DROPS OPHTHALMIC at 19:09

## 2022-02-06 RX ADMIN — Medication 1: at 12:33

## 2022-02-06 RX ADMIN — BRIMONIDINE TARTRATE 1 DROP(S): 2 SOLUTION/ DROPS OPHTHALMIC at 06:14

## 2022-02-06 RX ADMIN — HEPARIN SODIUM 5000 UNIT(S): 5000 INJECTION INTRAVENOUS; SUBCUTANEOUS at 06:14

## 2022-02-06 NOTE — PROGRESS NOTE ADULT - SUBJECTIVE AND OBJECTIVE BOX
THE PATIENT WAS SEEN AND EXAMINED BY ME WITH THE HOUSESTAFF AND STROKE TEAM DURING MORNING ROUNDS.   HPI:  89yo RH F PMHx HTN, HLD, TIA not on ac/ap agents, who presented to ED for change in mental status, slumping over, L facial droop, and possible L hemiparesis. Patient last seen by accompanying family member at 11 AM 2/1/22 with mild confusion. Patient went to sleep and woke up in evening of 2/1/22 asking for help. Was found slumped over, slurred speech and L facial droop. EMS called for concern of stroke. At baseline, able to feed herself, use bathroom herself. Ambulates with walker. Needs assistance with other ADLs. Patient unable to provide ROS. NIHSS: 16, preMRS: 3, ICH: 2        SUBJECTIVE: No events overnight.  No new neurologic complaints, ROS reported negative unless otherwise noted.      acetaminophen     Tablet .. 650 milliGRAM(s) Oral every 6 hours PRN  amLODIPine   Tablet 10 milliGRAM(s) Oral daily  atorvastatin 40 milliGRAM(s) Oral at bedtime  brimonidine 0.2% Ophthalmic Solution 1 Drop(s) Both EYES two times a day  dextrose 40% Gel 15 Gram(s) Oral once  dextrose 5%. 1000 milliLiter(s) IV Continuous <Continuous>  dextrose 5%. 1000 milliLiter(s) IV Continuous <Continuous>  dextrose 50% Injectable 25 Gram(s) IV Push once  dextrose 50% Injectable 12.5 Gram(s) IV Push once  dextrose 50% Injectable 25 Gram(s) IV Push once  glucagon  Injectable 1 milliGRAM(s) IntraMuscular once  heparin   Injectable 5000 Unit(s) SubCutaneous every 12 hours  insulin glargine Injectable (LANTUS) 5 Unit(s) SubCutaneous at bedtime  insulin lispro (ADMELOG) corrective regimen sliding scale   SubCutaneous Before meals and at bedtime  lactated ringers. 1000 milliLiter(s) IV Continuous <Continuous>  latanoprost 0.005% Ophthalmic Solution 1 Drop(s) Both EYES at bedtime  levothyroxine 88 MICROGram(s) Oral daily  LORazepam     Tablet 1 milliGRAM(s) Oral once      PHYSICAL EXAM:   Vital Signs Last 24 Hrs  T(C): 37.4 (06 Feb 2022 04:00), Max: 37.4 (06 Feb 2022 04:00)  T(F): 99.4 (06 Feb 2022 04:00), Max: 99.4 (06 Feb 2022 04:00)  HR: 75 (06 Feb 2022 04:00) (73 - 75)  BP: 154/71 (06 Feb 2022 04:00) (131/57 - 155/67)  BP(mean): 102 (06 Feb 2022 04:00) (89 - 115)  RR: 17 (06 Feb 2022 04:00) (17 - 35)  SpO2: 96% (06 Feb 2022 04:00) (93% - 99%)    General: No acute distress  HEENT: EOM intact, visual fields full  Abdomen: Soft, nontender, nondistended   Extremities: No edema    NEUROLOGICAL EXAM:  Mental status: Eyes open, awake, alert, oriented to name, states 2022 for year, generates some short phrases, follows most one step commands   Cranial Nerves: Mild left facial palsy, no nystagmus, no dysarthria,  tongue midline  Motor exam: Normal tone, no drift, RUE/RLE 4/5, LUE/LLE 4/5.    Sensation: Intact to light touch   Coordination/ Gait: gait not assessed       LABS:                        10.1   8.32  )-----------( 308      ( 06 Feb 2022 01:33 )             32.7    02-06    138  |  102  |  18  ----------------------------<  134<H>  4.1   |  25  |  1.38<H>    Ca    8.8      06 Feb 2022 01:33      IMAGING: Reviewed by me.     CT Head No Cont (02.03.22 @ 11:41) Better definition of right superior cerebellar infarct which appears recent when compared with the prior 2/2/2022. Evolution of what appears to be a hemorrhagic right parietal territory recent infarct with mass effect on the atria and posterior body of the right lateral ventricle. Study is limited by patient positioning with portion of the left brain not visualized    EEG report 2/3/22 Abnormal EEG study. Mild to moderate nonspecific diffuse or multifocal cerebral dysfunction.  No epileptiform pattern or seizure seen.    CT HEAD: (02.02.22 @ 03:59)  Progression of cytotoxic edema and petechial hemorrhage in the right parieto-occipital infarct.. Age-indeterminate ischemia/infarct in the superior right cerebellum.    CTA BRAIN:  Patent intracranial circulation. No large vessel occlusion or significant stenosis..    CTA NECK: Patent cervical vasculature. Possible severe stenosis at the origin of  the right vertebral artery versus artifact from regional motion.    CT Brain Stroke Protocol (02.02.22 @ 00:42)  Hemorrhagic infarct in the right PCA territory.

## 2022-02-06 NOTE — PROGRESS NOTE ADULT - ASSESSMENT
89yo RH F PMHx HTN, HLD, ?TIA not on ac/ap agents, who presents to ED for change in mental status, slumping over, L facial droop, dysarthria and possible L hemiparesis. Vs 97.7F, HR 70, 136/61, RR18, 100%RA. CK wnl. LKW: 11 AM 2/1/22. Not a tPA candidate due to outside window and hemorrhage noted on CT. Not a thrombectomy candidate since no brigitte LVO. CT head: Gyriform hyperdensity in the right parietal and occipital lobes with surrounding cytotoxic edema. Unable to obtain CTA/P during code stroke as patient started drooling and spitting up in CT scan and had episode of desaturation. Patient removed from CT scan and stabilized in trauma room by ED staff.  Repeat CTH/A shows Progression of cytotoxic edema and petechial hemorrhage in the right parieto-occipital infarct. Age-indeterminate ischemia/infarct in the superior right cerebellum. CTA BRAIN: Patent intracranial circulation. No large vessel occlusion or significant stenosis. CTA NECK: Patent cervical vasculature. Possible severe stenosis at the origin of the right vertebral artery versus artifact from regional motion.    Impression:  R parieto-occipital lobe petechial hemorrhage and possible infarct with age indeterminate infarct in superior R cerebellum. Possible severe stenosis at the origin of the right vertebral artery versus artifact from regional motion. Mechanism:  Embolic stroke due to new onset Afib vs hypercoagulable state in the setting of covid-19 infection.     NEURO: neurologically without acute change, continue close monitoring for neurologic deterioration given cerebral edema with mass effect and brain compression, avoid hyponatremia, SBP< 160mmHg (goal 120-160mmHg) in setting of petechial hemorrhage, avoid hypotension and rapid fluctuations , LDL 49- continue home dose atorvastatin 40mg PO daily. Patient unable to tolerate MRI, does not need to be done as inpatient. Repeat CT scan performed 2/3 is stable, shows evolution of recent infarct w hemorrhagic transformation. VEEG (2/3) shows generalized slowing without seizures. Repeat Head CT on 2/6 prior to starting ASA or AC for afib.  Physical therapy/OT: [facility]     ANTITHROMBOTIC THERAPY: Plan to start ASA or AC on 02/06/22 pending repeat head CT on 02/06/22, for Atrial Fibrillation.     PULMONARY:  CT chest (02/03) Small right pleural effusion, monitor for hypoxia, prior mild hypoxia likely 2nd to atelectasis,  protecting airway, saturating well on room air. Pulmonary follow up appreciated.       CARDIOVASCULAR: check TTE, Patient noted to have Atrial Fibrillation on telemonitor on 2/4/22. Dr Keys (cardio) consult appreciated                          SBP goal: 120-160mmhg     GASTROINTESTINAL:  SLP eval with recommendations for Puree diet and Mildly Thick Liquids, tolerating well      RENAL: BUN/Cr without acuate change, maintain adequate hydration,  good urine output      Na Goal: Greater than 135     Amin: No      HEMATOLOGY: H/H with anemia but no acute change, Platelets 350, D-Dimer (02/04/22): 522. LE doppler (02/03): No evidence of deep venous thrombosis in either proximal lower extremity. Hematology follow up appreciated: [facility]- follows at [facility]     DVT ppx: Heparin s.c      ID: afebrile, no leukocytosis, UA neg , monitor for si/sx of infection     Other: Dr Sharpe (medicine) f/u appreciated. PLan/golas of care discussed over the phone with pt's daughter    DISPOSITION: [facility] as per PT/OT eval once medical work up is completed       CORE MEASURES:        Admission NIHSS: 16     TPA: [] YES [x] NO      LDL/HDL:p     Depression Screen: 0     Statin Therapy: y      Dysphagia Screen: [x] PASS [] FAIL     Smoking [] YES x[] NO      Afib [] YES [x] NO     Stroke Education [x] YES [] NO    Obtain screening lower extremity venous ultrasound in patients who meet 1 or more of the following criteria as patient is high risk for DVT/PE on admission:   [] History of DVT/PE  [x]Hypercoagulable states (Factor V Leiden, Cancer, OCP, etc. )  [x]Prolonged immobility (hemiplegia/hemiparesis/post operative or any other extended immobilization)  [] Transferred from outside facility (Rehab or Long term care)  [] Age </= to 50

## 2022-02-06 NOTE — PROGRESS NOTE ADULT - SUBJECTIVE AND OBJECTIVE BOX
DATE OF SERVICE: 02-06-22 @ 12:34    Patient is a 89y old  Female who presents with a chief complaint of concern for stroke (06 Feb 2022 09:15)      SUBJECTIVE / OVERNIGHT EVENTS:  No chest pain. No shortness of breath. No complaints. No events overnight.     MEDICATIONS  (STANDING):  amLODIPine   Tablet 10 milliGRAM(s) Oral daily  atorvastatin 40 milliGRAM(s) Oral at bedtime  brimonidine 0.2% Ophthalmic Solution 1 Drop(s) Both EYES two times a day  dextrose 40% Gel 15 Gram(s) Oral once  dextrose 5%. 1000 milliLiter(s) (50 mL/Hr) IV Continuous <Continuous>  dextrose 5%. 1000 milliLiter(s) (100 mL/Hr) IV Continuous <Continuous>  dextrose 50% Injectable 25 Gram(s) IV Push once  dextrose 50% Injectable 12.5 Gram(s) IV Push once  dextrose 50% Injectable 25 Gram(s) IV Push once  glucagon  Injectable 1 milliGRAM(s) IntraMuscular once  heparin   Injectable 5000 Unit(s) SubCutaneous every 12 hours  insulin glargine Injectable (LANTUS) 5 Unit(s) SubCutaneous at bedtime  insulin lispro (ADMELOG) corrective regimen sliding scale   SubCutaneous Before meals and at bedtime  lactated ringers. 1000 milliLiter(s) (75 mL/Hr) IV Continuous <Continuous>  latanoprost 0.005% Ophthalmic Solution 1 Drop(s) Both EYES at bedtime  levothyroxine 88 MICROGram(s) Oral daily    MEDICATIONS  (PRN):  acetaminophen     Tablet .. 650 milliGRAM(s) Oral every 6 hours PRN Mild Pain (1 - 3), Moderate Pain (4 - 6)      Vital Signs Last 24 Hrs  T(C): 37.9 (06 Feb 2022 08:00), Max: 37.9 (06 Feb 2022 08:00)  T(F): 100.2 (06 Feb 2022 08:00), Max: 100.2 (06 Feb 2022 08:00)  HR: 75 (06 Feb 2022 12:00) (73 - 75)  BP: 117/58 (06 Feb 2022 12:00) (117/58 - 154/71)  BP(mean): 84 (06 Feb 2022 12:00) (84 - 115)  RR: 19 (06 Feb 2022 12:00) (15 - 35)  SpO2: 92% (06 Feb 2022 12:00) (92% - 99%)  CAPILLARY BLOOD GLUCOSE      POCT Blood Glucose.: 161 mg/dL (06 Feb 2022 12:27)  POCT Blood Glucose.: 127 mg/dL (06 Feb 2022 08:42)  POCT Blood Glucose.: 102 mg/dL (05 Feb 2022 21:10)  POCT Blood Glucose.: 207 mg/dL (05 Feb 2022 17:19)    I&O's Summary    05 Feb 2022 07:01  -  06 Feb 2022 07:00  --------------------------------------------------------  IN: 2025 mL / OUT: 1050 mL / NET: 975 mL    06 Feb 2022 07:01  -  06 Feb 2022 12:34  --------------------------------------------------------  IN: 0 mL / OUT: 300 mL / NET: -300 mL        PHYSICAL EXAM:  GENERAL: NAD, well-developed  HEAD:  Atraumatic, Normocephalic  EYES: EOMI, PERRLA, conjunctiva and sclera clear  NECK: Supple, No JVD  CHEST/LUNG: Clear to auscultation bilaterally; No wheeze  HEART: Regular rate and rhythm; No murmurs, rubs, or gallops  ABDOMEN: Soft, Nontender, Nondistended; Bowel sounds present  EXTREMITIES:  2+ Peripheral Pulses, No clubbing, cyanosis, or edema  PSYCH: AAOx1  NEUROLOGY: non-focal  SKIN: No rashes or lesions    LABS:                        10.1   8.32  )-----------( 308      ( 06 Feb 2022 01:33 )             32.7     02-06    138  |  102  |  18  ----------------------------<  134<H>  4.1   |  25  |  1.38<H>    Ca    8.8      06 Feb 2022 01:33                RADIOLOGY & ADDITIONAL TESTS:    Imaging Personally Reviewed:    Consultant(s) Notes Reviewed:      Care Discussed with Consultants/Other Providers:

## 2022-02-07 ENCOUNTER — TRANSCRIPTION ENCOUNTER (OUTPATIENT)
Age: 87
End: 2022-02-07

## 2022-02-07 LAB
CULTURE RESULTS: SIGNIFICANT CHANGE UP
CULTURE RESULTS: SIGNIFICANT CHANGE UP
GLUCOSE BLDC GLUCOMTR-MCNC: 133 MG/DL — HIGH (ref 70–99)
GLUCOSE BLDC GLUCOMTR-MCNC: 140 MG/DL — HIGH (ref 70–99)
GLUCOSE BLDC GLUCOMTR-MCNC: 154 MG/DL — HIGH (ref 70–99)
GLUCOSE BLDC GLUCOMTR-MCNC: 164 MG/DL — HIGH (ref 70–99)
GLUCOSE BLDC GLUCOMTR-MCNC: 86 MG/DL — SIGNIFICANT CHANGE UP (ref 70–99)
SPECIMEN SOURCE: SIGNIFICANT CHANGE UP
SPECIMEN SOURCE: SIGNIFICANT CHANGE UP

## 2022-02-07 PROCEDURE — 93308 TTE F-UP OR LMTD: CPT | Mod: 26

## 2022-02-07 PROCEDURE — 93321 DOPPLER ECHO F-UP/LMTD STD: CPT | Mod: 26

## 2022-02-07 RX ADMIN — INSULIN GLARGINE 5 UNIT(S): 100 INJECTION, SOLUTION SUBCUTANEOUS at 23:08

## 2022-02-07 RX ADMIN — LATANOPROST 1 DROP(S): 0.05 SOLUTION/ DROPS OPHTHALMIC; TOPICAL at 23:10

## 2022-02-07 RX ADMIN — SODIUM CHLORIDE 75 MILLILITER(S): 9 INJECTION, SOLUTION INTRAVENOUS at 11:40

## 2022-02-07 RX ADMIN — Medication 1: at 17:45

## 2022-02-07 RX ADMIN — BRIMONIDINE TARTRATE 1 DROP(S): 2 SOLUTION/ DROPS OPHTHALMIC at 06:08

## 2022-02-07 RX ADMIN — BRIMONIDINE TARTRATE 1 DROP(S): 2 SOLUTION/ DROPS OPHTHALMIC at 17:45

## 2022-02-07 RX ADMIN — AMLODIPINE BESYLATE 10 MILLIGRAM(S): 2.5 TABLET ORAL at 05:56

## 2022-02-07 RX ADMIN — Medication 81 MILLIGRAM(S): at 11:40

## 2022-02-07 RX ADMIN — HEPARIN SODIUM 5000 UNIT(S): 5000 INJECTION INTRAVENOUS; SUBCUTANEOUS at 05:55

## 2022-02-07 RX ADMIN — ATORVASTATIN CALCIUM 40 MILLIGRAM(S): 80 TABLET, FILM COATED ORAL at 23:09

## 2022-02-07 RX ADMIN — Medication 88 MICROGRAM(S): at 05:56

## 2022-02-07 RX ADMIN — HEPARIN SODIUM 5000 UNIT(S): 5000 INJECTION INTRAVENOUS; SUBCUTANEOUS at 17:34

## 2022-02-07 NOTE — DISCHARGE NOTE PROVIDER - NSDCQMSTROKERISK_NEU_ALL_CORE
High blood pressure/History of a stroke or TIA Atrial fibrillation/High blood pressure/History of a stroke or TIA

## 2022-02-07 NOTE — DISCHARGE NOTE PROVIDER - CARE PROVIDER_API CALL
Collins Antonio (DO)  Neurology; Vascular Neurology  [address] [facility] [address]  [place] [address]  Phone: [phone]  Fax: [phone]  Follow Up Time: 1 month    Ty Keys (DO)  Cardiology; Internal Medicine  [address] [facility], [address]  [place] [address]  Phone: [phone]  Fax: [phone]  Follow Up Time: 1 month   Collins Antonio (DO)  Neurology; Vascular Neurology  [address] [facility] [address]  [place] [address]  Phone: [phone]  Fax: [phone]  Follow Up Time: 1 month    Ty Keys (DO)  Cardiology; Internal Medicine  [address] [facility], [address]  [place] [address]  Phone: [phone]  Fax: [phone]  Follow Up Time: 1 month    Kimberlee Batista)  Hematology; Medical Oncology  [address] [facility] [address]  [place] [address]  Phone: [phone]  Fax: [phone]  Follow Up Time: 1 month

## 2022-02-07 NOTE — PROGRESS NOTE ADULT - SUBJECTIVE AND OBJECTIVE BOX
THE PATIENT WAS SEEN AND EXAMINED BY ME WITH THE HOUSESTAFF AND STROKE TEAM DURING MORNING ROUNDS.   HPI:  89yo RH F PMHx HTN, HLD, TIA not on ac/ap agents, who presented to ED for change in mental status, slumping over, L facial droop, and possible L hemiparesis. Patient last seen by accompanying family member at 11 AM 2/1/22 with mild confusion. Patient went to sleep and woke up in evening of 2/1/22 asking for help. Was found slumped over, slurred speech and L facial droop. EMS called for concern of stroke. At baseline, able to feed herself, use bathroom herself. Ambulates with walker. Needs assistance with other ADLs. Patient unable to provide ROS. NIHSS: 16, preMRS: 3, ICH: 2      SUBJECTIVE: No events overnight.  No new neurologic complaints.  ROS reported negative unless otherwise noted.    acetaminophen     Tablet .. 650 milliGRAM(s) Oral every 6 hours PRN  amLODIPine   Tablet 10 milliGRAM(s) Oral daily  aspirin  chewable 81 milliGRAM(s) Oral daily  atorvastatin 40 milliGRAM(s) Oral at bedtime  brimonidine 0.2% Ophthalmic Solution 1 Drop(s) Both EYES two times a day  dextrose 40% Gel 15 Gram(s) Oral once  dextrose 5%. 1000 milliLiter(s) IV Continuous <Continuous>  dextrose 5%. 1000 milliLiter(s) IV Continuous <Continuous>  dextrose 50% Injectable 25 Gram(s) IV Push once  dextrose 50% Injectable 12.5 Gram(s) IV Push once  dextrose 50% Injectable 25 Gram(s) IV Push once  glucagon  Injectable 1 milliGRAM(s) IntraMuscular once  heparin   Injectable 5000 Unit(s) SubCutaneous every 12 hours  insulin glargine Injectable (LANTUS) 5 Unit(s) SubCutaneous at bedtime  insulin lispro (ADMELOG) corrective regimen sliding scale   SubCutaneous Before meals and at bedtime  lactated ringers. 1000 milliLiter(s) IV Continuous <Continuous>  latanoprost 0.005% Ophthalmic Solution 1 Drop(s) Both EYES at bedtime  levothyroxine 88 MICROGram(s) Oral daily      PHYSICAL EXAM:   Vital Signs Last 24 Hrs  T(C): 36.7 (07 Feb 2022 04:00), Max: 37.4 (06 Feb 2022 18:30)  T(F): 98 (07 Feb 2022 04:00), Max: 99.3 (06 Feb 2022 18:30)  HR: 72 (07 Feb 2022 04:00) (72 - 76)  BP: 130/70 (07 Feb 2022 04:00) (117/58 - 142/78)  BP(mean): 89 (06 Feb 2022 16:00) (84 - 89)  RR: 18 (07 Feb 2022 04:00) (18 - 20)  SpO2: 97% (07 Feb 2022 04:00) (92% - 97%)    General: No acute distress  HEENT: EOM intact, visual fields full  Abdomen: Soft, nontender, nondistended   Extremities: No edema    NEUROLOGICAL EXAM:  Mental status: Eyes open, awake, alert, oriented to name, age, generates some short phrases, follows most one step commands   Cranial Nerves: Mild left facial palsy, no nystagmus, no dysarthria,  tongue midline  Motor exam: Normal tone, no drift, RUE/RLE 4/5, LUE/LLE 4/5.    Sensation: Intact to light touch   Coordination/ Gait: gait not assessed    LABS:                        10.1   8.32  )-----------( 308      ( 06 Feb 2022 01:33 )             32.7    02-06    138  |  102  |  18  ----------------------------<  134<H>  4.1   |  25  |  1.38<H>    Ca    8.8      06 Feb 2022 01:33          IMAGING: Reviewed by me.   CT Head No Cont (02.06.22 @ 10:04)   Evolving known right-sided superior cerebellar territory infarct with   mild petechial hemorrhage. Stable right posterior parietal hemorrhagic   infarct with increased mass effect on the posterior body of the right   lateral ventricle. Subtle 0.3 cm of right to left midline shift similar   when compared to prior exam.    CT Head No Cont (02.03.22 @ 11:41) Better definition of right superior cerebellar infarct which appears recent when compared with the prior 2/2/2022. Evolution of what appears to be a hemorrhagic right parietal territory recent infarct with mass effect on the atria and posterior body of the right lateral ventricle. Study is limited by patient positioning with portion of the left brain not visualized    EEG report 2/3/22 Abnormal EEG study. Mild to moderate nonspecific diffuse or multifocal cerebral dysfunction.  No epileptiform pattern or seizure seen.    CT HEAD: (02.02.22 @ 03:59)  Progression of cytotoxic edema and petechial hemorrhage in the right parieto-occipital infarct.. Age-indeterminate ischemia/infarct in the superior right cerebellum.    CTA BRAIN:  Patent intracranial circulation. No large vessel occlusion or significant stenosis..    CTA NECK: Patent cervical vasculature. Possible severe stenosis at the origin of  the right vertebral artery versus artifact from regional motion.    CT Brain Stroke Protocol (02.02.22 @ 00:42)  Hemorrhagic infarct in the right PCA territory.

## 2022-02-07 NOTE — DISCHARGE NOTE PROVIDER - NSDCCPCAREPLAN_GEN_ALL_CORE_FT
PRINCIPAL DISCHARGE DIAGNOSIS  Diagnosis: Stroke  Assessment and Plan of Treatment: Please follow up with neurologist after being discharged from rehab. Continue taking medications as prescribed. Monitor your blood pressure. Reduce fat, cholesterol and salt in your diet. Increase intake of fruits and vegetables. Limit alcohol to minimum and do not smoke. You may be at risk for falling, make changes to your home to help you walk easier. Keep up to date on vaccinations.  If you experience any symptoms of facial drooping, slurred speech, arm or leg weakness, severe headache, vision changes or any worsening symptoms, notify provider immediatley and return to ER.         PRINCIPAL DISCHARGE DIAGNOSIS  Diagnosis: Stroke  Assessment and Plan of Treatment: Please follow up with neurologist after being discharged from rehab. Continue taking medications as prescribed. Monitor your blood pressure. Reduce fat, cholesterol and salt in your diet. Increase intake of fruits and vegetables. Limit alcohol to minimum and do not smoke. You may be at risk for falling, make changes to your home to help you walk easier. Keep up to date on vaccinations.  If you experience any symptoms of facial drooping, slurred speech, arm or leg weakness, severe headache, vision changes or any worsening symptoms, notify provider immediatley and return to ER.        SECONDARY DISCHARGE DIAGNOSES  Diagnosis: Atrial fibrillation  Assessment and Plan of Treatment: ANTITHROMBOTIC THERAPY: ASA started 02/06/22 after stable repeat head CT, plan to started Eliquis on 02/22 for Atrial Fibrillation and could stop the ASA at that time.     PRINCIPAL DISCHARGE DIAGNOSIS  Diagnosis: Stroke  Assessment and Plan of Treatment: Please follow up with neurologist after being discharged from rehab. Continue taking medications as prescribed. Monitor your blood pressure. Reduce fat, cholesterol and salt in your diet. Increase intake of fruits and vegetables. Limit alcohol to minimum and do not smoke. You may be at risk for falling, make changes to your home to help you walk easier. Keep up to date on vaccinations.  If you experience any symptoms of facial drooping, slurred speech, arm or leg weakness, severe headache, vision changes or any worsening symptoms, notify provider immediatley and return to ER.        SECONDARY DISCHARGE DIAGNOSES  Diagnosis: Atrial fibrillation  Assessment and Plan of Treatment: ANTITHROMBOTIC THERAPY: ASA started 02/06/22 after stable repeat head CT, plan to started Eliquis on 02/16 for Atrial Fibrillation and could stop the ASA at that time.

## 2022-02-07 NOTE — PROGRESS NOTE ADULT - SUBJECTIVE AND OBJECTIVE BOX
Follow-up Pulm Progress Note    No new respiratory events overnight.  Denies SOB/CP.   Awake & alert, granddaughter at bedside.     Medications:  MEDICATIONS  (STANDING):  amLODIPine   Tablet 10 milliGRAM(s) Oral daily  aspirin  chewable 81 milliGRAM(s) Oral daily  atorvastatin 40 milliGRAM(s) Oral at bedtime  brimonidine 0.2% Ophthalmic Solution 1 Drop(s) Both EYES two times a day  dextrose 40% Gel 15 Gram(s) Oral once  dextrose 5%. 1000 milliLiter(s) (50 mL/Hr) IV Continuous <Continuous>  dextrose 5%. 1000 milliLiter(s) (100 mL/Hr) IV Continuous <Continuous>  dextrose 50% Injectable 25 Gram(s) IV Push once  dextrose 50% Injectable 12.5 Gram(s) IV Push once  dextrose 50% Injectable 25 Gram(s) IV Push once  glucagon  Injectable 1 milliGRAM(s) IntraMuscular once  heparin   Injectable 5000 Unit(s) SubCutaneous every 12 hours  insulin glargine Injectable (LANTUS) 5 Unit(s) SubCutaneous at bedtime  insulin lispro (ADMELOG) corrective regimen sliding scale   SubCutaneous Before meals and at bedtime  lactated ringers. 1000 milliLiter(s) (75 mL/Hr) IV Continuous <Continuous>  latanoprost 0.005% Ophthalmic Solution 1 Drop(s) Both EYES at bedtime  levothyroxine 88 MICROGram(s) Oral daily    MEDICATIONS  (PRN):  acetaminophen     Tablet .. 650 milliGRAM(s) Oral every 6 hours PRN Mild Pain (1 - 3), Moderate Pain (4 - 6)    Vital Signs Last 24 Hrs  T(C): 37.2 (07 Feb 2022 10:48), Max: 37.4 (06 Feb 2022 18:30)  T(F): 99 (07 Feb 2022 10:48), Max: 99.3 (06 Feb 2022 18:30)  HR: 77 (07 Feb 2022 10:48) (72 - 77)  BP: 148/85 (07 Feb 2022 10:48) (129/56 - 148/85)  BP(mean): 89 (06 Feb 2022 16:00) (89 - 89)  RR: 18 (07 Feb 2022 10:48) (18 - 20)  SpO2: 96% (07 Feb 2022 10:48) (93% - 97%)    02-06 @ 07:01  -  02-07 @ 07:00  --------------------------------------------------------  IN: 0 mL / OUT: 1200 mL / NET: -1200 mL    LABS:                        10.1   8.32  )-----------( 308      ( 06 Feb 2022 01:33 )             32.7     02-06    138  |  102  |  18  ----------------------------<  134<H>  4.1   |  25  |  1.38<H>    Ca    8.8      06 Feb 2022 01:33    CAPILLARY BLOOD GLUCOSE  POCT Blood Glucose.: 140 mg/dL (07 Feb 2022 11:39)    CULTURES:    Culture - Blood (collected 02-02-22 @ 19:26)  Source: .Blood Blood-Peripheral  Preliminary Report (02-03-22 @ 20:01):    No growth to date.    Culture - Blood (collected 02-02-22 @ 14:51)  Source: .Blood Blood-Peripheral  Preliminary Report (02-03-22 @ 15:02):    No growth to date.    Culture - Urine (collected 02-02-22 @ 03:59)  Source: Catheterized Catheterized  Final Report (02-02-22 @ 23:25):    No growth    Physical Examination:  PULM: Decreased at bases   CVS: S1, S2 heard    RADIOLOGY REVIEWED     CT chest: < from: CT Chest No Cont (02.03.22 @ 11:44) >  FINDINGS:    LYMPH NODES: No lymphadenopathy.    HEART/VASCULATURE: Cardiomegaly. Coronary artery and mitral annular   calcifications. There is no pericardial effusion. Aortic calcifications.    AIRWAYS/LUNGS/PLEURA: Patent central airways. Minimal bibasilar linear   atelectasis. The lungs are otherwise clear. There is a small right   pleural effusion.    UPPER ABDOMEN: 2.9 cm left upper pole renal partially imaged cyst. Tiny   hiatal hernia. Punctate right adrenal calcification.    BONES/SOFT TISSUES: Spinal degenerative changes. Healed bilateral lower   rib fractures.    IMPRESSION:    Small right pleural effusion.        --- End of Report ---      < end of copied text >      < from: CT Head No Cont (02.06.22 @ 10:04) >  FINDINGS:    PARENCHYMA: Redemonstration of known evolving right-sided superior   cerebellar territory infarct with petechial hemorrhage and right   posterior parietal hemorrhagic infarct with increased mass effect on the   posterior body of the right lateral ventricle when compared to prior exam   from 2/3/2022. Subtle 0.3 cm of right to left midline shift (2, 16)   similar when compared to prior exam. No new infarcts or areas of   hemorrhage. Age appropriate involutional changes and small vessel white   matter ischemic type changes.  VENTRICLES: No hydrocephalus.  EXTRA-AXIAL: No abnormal extraaxial collection.  PARANASAL SINUSES: 1.5 cm mucous retention cyst in the right maxillary   sinus.  TYMPANOMASTOID CAVITIES: Within normal limits.  ORBITS: Bilateral lens replacement surgery.  BONES: Within normal limits.    IMPRESSION:  Evolving known right-sided superior cerebellar territory infarct with   mild petechial hemorrhage. Stable right posterior parietal hemorrhagic   infarct with increased mass effect on the posterior body of the right   lateral ventricle. Subtle 0.3 cm of right to left midline shift similar   when compared to prior exam.    < end of copied text >     4 or more times a week

## 2022-02-07 NOTE — DISCHARGE NOTE PROVIDER - DISCHARGE DIET
Consistent Carbohydrate Diabetic Diets/Pureed Diet Consistent Carbohydrate Diabetic Diets/Pureed Diet/Mildly Thick Liquids

## 2022-02-07 NOTE — DISCHARGE NOTE PROVIDER - PROVIDER TOKENS
PROVIDER:[TOKEN:[[id number]],FOLLOWUP:[1 month]],PROVIDER:[TOKEN:[[id number]],FOLLOWUP:[1 month]] PROVIDER:[TOKEN:[[id number]],FOLLOWUP:[1 month]],PROVIDER:[TOKEN:[[id number]],FOLLOWUP:[1 month]],PROVIDER:[TOKEN:[[id number]],FOLLOWUP:[1 month]]

## 2022-02-07 NOTE — DISCHARGE NOTE PROVIDER - NSDCMRMEDTOKEN_GEN_ALL_CORE_FT
amLODIPine 5 mg oral tablet: 1 tab(s) orally once a day  Apidra SoloStar Pen 100 units/mL subcutaneous solution: 8 unit(s) subcutaneous 3 times a day (before meals)  atorvastatin 40 mg oral tablet: 1 tab(s) orally once a day  brimonidine 0.15% ophthalmic solution: 1 drop(s) to each affected eye 2 times a day  insulin glargine 100 units/mL subcutaneous solution: 12 unit(s) subcutaneous once a day (at bedtime)  isosorbide mononitrate 60 mg oral tablet, extended release: 1 tab(s) orally once a day (in the morning)  latanoprost 0.005% ophthalmic solution: 1 drop(s) to each affected eye once a day (in the evening)  levothyroxine: 88 microgram(s) orally once a day  Percocet 5/325 oral tablet: 1 tab(s) orally 3 times a day, As Needed  traMADol 50 mg oral tablet: 1 tab(s) orally 3 times a day, As Needed   amLODIPine 10 mg oral tablet: 1 tab(s) orally once a day  aspirin 81 mg oral tablet, chewable: 1 tab(s) orally once a day  atorvastatin 40 mg oral tablet: 1 tab(s) orally once a day  brimonidine 0.15% ophthalmic solution: 1 drop(s) to each affected eye 2 times a day  heparin: 5000 unit(s) subcutaneous every 12 hours  insulin glargine: 5 unit(s) subcutaneous once a day (at bedtime)  insulin lispro 100 units/mL injectable solution: 1  unit if glucose 151- 200  2 Unit(s) if Glucose 201 - 250  3 Unit(s) if Glucose 251 - 300  4 Unit(s) if Glucose 301 - 350  5 Unit(s) if Glucose 351 - 400  6 Unit(s) if Glucose Greater Than 400  Before meals and bedtime  latanoprost 0.005% ophthalmic solution: 1 drop(s) to each affected eye once a day (in the evening)  levothyroxine: 88 microgram(s) orally once a day

## 2022-02-07 NOTE — PROGRESS NOTE ADULT - SUBJECTIVE AND OBJECTIVE BOX
Subjective: Patient seen and examined. No new events except as noted.     REVIEW OF SYSTEMS:    CONSTITUTIONAL: + weakness, fevers or chills  EYES/ENT: No visual changes;  No vertigo or throat pain   NECK: No pain or stiffness  RESPIRATORY: No cough, wheezing, hemoptysis; No shortness of breath  CARDIOVASCULAR: No chest pain or palpitations  GASTROINTESTINAL: No abdominal or epigastric pain. No nausea, vomiting, or hematemesis; No diarrhea or constipation. No melena or hematochezia.  GENITOURINARY: No dysuria, frequency or hematuria  NEUROLOGICAL: No numbness or weakness  SKIN: No itching, burning, rashes, or lesions   All other review of systems is negative unless indicated above.    MEDICATIONS:  MEDICATIONS  (STANDING):  amLODIPine   Tablet 10 milliGRAM(s) Oral daily  aspirin  chewable 81 milliGRAM(s) Oral daily  atorvastatin 40 milliGRAM(s) Oral at bedtime  brimonidine 0.2% Ophthalmic Solution 1 Drop(s) Both EYES two times a day  dextrose 40% Gel 15 Gram(s) Oral once  dextrose 5%. 1000 milliLiter(s) (50 mL/Hr) IV Continuous <Continuous>  dextrose 5%. 1000 milliLiter(s) (100 mL/Hr) IV Continuous <Continuous>  dextrose 50% Injectable 25 Gram(s) IV Push once  dextrose 50% Injectable 12.5 Gram(s) IV Push once  dextrose 50% Injectable 25 Gram(s) IV Push once  glucagon  Injectable 1 milliGRAM(s) IntraMuscular once  heparin   Injectable 5000 Unit(s) SubCutaneous every 12 hours  insulin glargine Injectable (LANTUS) 5 Unit(s) SubCutaneous at bedtime  insulin lispro (ADMELOG) corrective regimen sliding scale   SubCutaneous Before meals and at bedtime  lactated ringers. 1000 milliLiter(s) (75 mL/Hr) IV Continuous <Continuous>  latanoprost 0.005% Ophthalmic Solution 1 Drop(s) Both EYES at bedtime  levothyroxine 88 MICROGram(s) Oral daily      PHYSICAL EXAM:  T(C): 37.2 (02-07-22 @ 10:48), Max: 37.4 (02-06-22 @ 18:30)  HR: 77 (02-07-22 @ 10:48) (72 - 77)  BP: 148/85 (02-07-22 @ 10:48) (129/56 - 148/85)  RR: 18 (02-07-22 @ 10:48) (18 - 18)  SpO2: 96% (02-07-22 @ 10:48) (94% - 97%)  Wt(kg): --  I&O's Summary    06 Feb 2022 07:01  -  07 Feb 2022 07:00  --------------------------------------------------------  IN: 0 mL / OUT: 1200 mL / NET: -1200 mL    07 Feb 2022 07:01  -  07 Feb 2022 16:33  --------------------------------------------------------  IN: 480 mL / OUT: 800 mL / NET: -320 mL      Appearance: NAD	  HEENT: dry oral mucosa, PERRL, EOMI	  Lymphatic: No lymphadenopathy , no edema  Cardiovascular: Normal S1 S2, No JVD, No murmurs , Peripheral pulses palpable 2+ bilaterally  Respiratory: Lungs clear to auscultation, normal effort 	  Gastrointestinal:  Soft, Non-tender, + BS	  Skin: No rashes, No ecchymoses, No cyanosis, warm to touch  Musculoskeletal: decreased R sided ROM/strength  NEUROLOGICAL EXAM:  Mental status: Eyes open, awake, alert, oriented to name, age, generates some short phrases, follows most one step commands   Cranial Nerves: Mild left facial palsy, no nystagmus, no dysarthria,  tongue midline  Motor exam: Normal tone, no drift, RUE/RLE 4/5, LUE/LLE 4/5.    Sensation: Intact to light touch   Coordination/ Gait: gait not assessed  Ext: No edema      LABS:    CARDIAC MARKERS:                   10.1   8.32  )-----------( 308      ( 06 Feb 2022 01:33 )             32.7     02-06    138  |  102  |  18  ----------------------------<  134<H>  4.1   |  25  |  1.38<H>    Ca    8.8      06 Feb 2022 01:33      proBNP:   Lipid Profile:   HgA1c:   TSH:     TELEMETRY: Afib 	    ECG:  	  RADIOLOGY:   DIAGNOSTIC TESTING:  [ ] Echocardiogram:  [ ]  Catheterization:  [ ] Stress Test:    OTHER:

## 2022-02-07 NOTE — PROGRESS NOTE ADULT - ASSESSMENT
89 y/o F with PMH of HTN, HLD, ?TIA,. Presents to ED for change in mental status, L facial droop, L hemiparesis. Found to have R parieto-occipital lobe petechial hemorrhage and possible infarct with age indeterminate in superior R cerebellum. COVID PCR on admission. Pulmonary called to consult for hypoxia, reportedly spo2 low 90s on arrival. CXR grossly clear. Spoke with daughter Feli (), notes mother is COVID vaccinated x2 doses, did not receive booster. No hx of lung disease, never smoker. No prior COVID infections. Unable to participate in ROS 2nd to mental status. O2 sats 100% on 2LNC, 96% on RA.

## 2022-02-07 NOTE — PROGRESS NOTE ADULT - PROBLEM SELECTOR PLAN 1
COVID PCR + 2/2  -CXR grossly clear  -No recent COVID infection per family, vaccinated x2 doses, did not receive booster   -Reportedly with hypoxia to low 90s on admission, O2 sats 96% on RA when seen   -Would monitor off treatment for now, mild hypoxia likely 2nd to atelectasis rather than COVID PNA   -Trend inflammatory markers  -DVT ppx  -LE duplex negative for DVT  -CT chest with no clear PNA.

## 2022-02-07 NOTE — DISCHARGE NOTE PROVIDER - HOSPITAL COURSE
89yo RH F PMHx HTN, HLD, TIA not on ac/ap agents, who presented to ED for change in mental status, slumping over, L facial droop, and possible L hemiparesis. Patient last seen by accompanying family member at 11 AM 2/1/22 with mild confusion. Patient went to sleep and woke up in evening of 2/1/22 asking for help. Was found slumped over, slurred speech and L facial droop. EMS called for concern of stroke. At baseline, able to feed herself, use bathroom herself. Ambulates with walker. Needs assistance with other ADLs. Patient unable to provide ROS. 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 Stable right posterior parietal hemorrhagic   infarct with increased mass effect on the posterior body of the right   lateral ventricle. Subtle 0.3 cm of right to left midline shift similar   when compared to prior exam.    CT Head No Cont (02.03.22 @ 11:41) Better definition of right superior cerebellar infarct which appears recent when compared with the prior 2/2/2022. Evolution of what appears to be a hemorrhagic right parietal territory recent infarct with mass effect on the atria and posterior body of the right lateral ventricle. Study is limited by patient positioning with portion of the left brain not visualized    EEG report 2/3/22 Abnormal EEG study. Mild to moderate nonspecific diffuse or multifocal cerebral dysfunction.  No epileptiform pattern or seizure seen.    CT HEAD: (02.02.22 @ 03:59)  Progression of cytotoxic edema and petechial hemorrhage in the right parieto-occipital infarct.. Age-indeterminate ischemia/infarct in the superior right cerebellum.    CTA BRAIN:  Patent intracranial circulation. No large vessel occlusion or significant stenosis..    CTA NECK: Patent cervical vasculature. Possible severe stenosis at the origin of  the right vertebral artery versus artifact from regional motion.    CT Brain Stroke Protocol (02.02.22 @ 00:42)  Hemorrhagic infarct in the right PCA territory.    Impression:  R parieto-occipital lobe petechial hemorrhage and possible infarct with age indeterminate infarct in superior R cerebellum. Possible severe stenosis at the origin of the right vertebral artery versus artifact from regional motion. Mechanism:  Embolic stroke due to new onset Afib vs hypercoagulable state in the setting of covid-19 infection.      LDL 49- continue home dose atorvastatin 40mg PO daily.  ANTITHROMBOTIC THERAPY: ASA started 02/06/22 after stable repeat head CT, plan to started Eliquis on 02/22 for Atrial Fibrillation.     Evaluated by PT/OT and was recommended LIBAN. Patient stable for discharge. 89yo RH F PMHx HTN, HLD, TIA not on ac/ap agents, who presented to ED for change in mental status, slumping over, L facial droop, and possible L hemiparesis. Patient last seen by accompanying family member at 11 AM 2/1/22 with mild confusion. Patient went to sleep and woke up in evening of 2/1/22 asking for help. Was found slumped over, slurred speech and L facial droop. EMS called for concern of stroke. At baseline, able to feed herself, use bathroom herself. Ambulates with walker. Needs assistance with other ADLs. Patient unable to provide ROS. 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 Stable right posterior parietal hemorrhagic   infarct with increased mass effect on the posterior body of the right   lateral ventricle. Subtle 0.3 cm of right to left midline shift similar   when compared to prior exam.    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 Stable right posterior parietal hemorrhagic infarct with increased mass effect on the posterior body of the right lateral ventricle. Subtle 0.3 cm of right to left midline shift similar when compared to prior exam.    CT Head No Cont (02.03.22 @ 11:41) Better definition of right superior cerebellar infarct which appears recent when compared with the prior 2/2/2022. Evolution of what appears to be a hemorrhagic right parietal territory recent infarct with mass effect on the atria and posterior body of the right lateral ventricle. Study is limited by patient positioning with portion of the left brain not visualized    EEG report 2/3/22 Abnormal EEG study. Mild to moderate nonspecific diffuse or multifocal cerebral dysfunction.  No epileptiform pattern or seizure seen.    CT HEAD: (02.02.22 @ 03:59)  Progression of cytotoxic edema and petechial hemorrhage in the right parieto-occipital infarct.. Age-indeterminate ischemia/infarct in the superior right cerebellum.    CTA BRAIN:  Patent intracranial circulation. No large vessel occlusion or significant stenosis..    CTA NECK: Patent cervical vasculature. Possible severe stenosis at the origin of  the right vertebral artery versus artifact from regional motion.    CT Brain Stroke Protocol (02.02.22 @ 00:42)  Hemorrhagic infarct in the right PCA territory.    Impression:  R parieto-occipital lobe petechial hemorrhage and possible infarct with age indeterminate infarct in superior R cerebellum. Possible severe stenosis at the origin of the right vertebral artery versus artifact from regional motion. Mechanism:  Embolic stroke due to new onset Afib vs hypercoagulable state in the setting of covid-19 infection.      LDL 49- continue home dose atorvastatin 40mg PO daily.    ANTITHROMBOTIC THERAPY: ASA started 02/06/22 after stable repeat head CT, plan to started Eliquis on 02/22 for Atrial Fibrillation and could stop the ASA at that time.     LE doppler (02/03): No evidence of deep venous thrombosis in either proximal lower extremity. Hematology follow up appreciated: [facility]- follows at [facility]    Evaluated by PT/OT and was recommended LIBAN. Patient stable for discharge. 89yo RH F PMHx HTN, HLD, TIA not on ac/ap agents, who presented to ED for change in mental status, slumping over, L facial droop, and possible L hemiparesis. Patient last seen by accompanying family member at 11 AM 2/1/22 with mild confusion. Patient went to sleep and woke up in evening of 2/1/22 asking for help. Was found slumped over, slurred speech and L facial droop. EMS called for concern of stroke. At baseline, able to feed herself, use bathroom herself. Ambulates with walker. Needs assistance with other ADLs. Patient unable to provide ROS. 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 Stable right posterior parietal hemorrhagic   infarct with increased mass effect on the posterior body of the right   lateral ventricle. Subtle 0.3 cm of right to left midline shift similar   when compared to prior exam.    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 Stable right posterior parietal hemorrhagic infarct with increased mass effect on the posterior body of the right lateral ventricle. Subtle 0.3 cm of right to left midline shift similar when compared to prior exam.    CT Head No Cont (02.03.22 @ 11:41) Better definition of right superior cerebellar infarct which appears recent when compared with the prior 2/2/2022. Evolution of what appears to be a hemorrhagic right parietal territory recent infarct with mass effect on the atria and posterior body of the right lateral ventricle. Study is limited by patient positioning with portion of the left brain not visualized    EEG report 2/3/22 Abnormal EEG study. Mild to moderate nonspecific diffuse or multifocal cerebral dysfunction.  No epileptiform pattern or seizure seen.    CT HEAD: (02.02.22 @ 03:59)  Progression of cytotoxic edema and petechial hemorrhage in the right parieto-occipital infarct.. Age-indeterminate ischemia/infarct in the superior right cerebellum.    CTA BRAIN:  Patent intracranial circulation. No large vessel occlusion or significant stenosis..    CTA NECK: Patent cervical vasculature. Possible severe stenosis at the origin of  the right vertebral artery versus artifact from regional motion.    CT Brain Stroke Protocol (02.02.22 @ 00:42)  Hemorrhagic infarct in the right PCA territory.    Impression:  R parieto-occipital lobe petechial hemorrhage and possible infarct with age indeterminate infarct in superior R cerebellum. Possible severe stenosis at the origin of the right vertebral artery versus artifact from regional motion. Mechanism:  Embolic stroke due to new onset Afib vs hypercoagulable state in the setting of covid-19 infection.      LDL 49- continue home dose atorvastatin 40mg PO daily.    ANTITHROMBOTIC THERAPY: ASA started 02/06/22 after stable repeat head CT, plan to started Eliquis on 02/16 for Atrial Fibrillation and could stop the ASA at that time.   D-Dimer increased to 1162 on 02/09, CTA Chest negative for PE.  LE doppler (02/03): No evidence of deep venous thrombosis in either proximal lower extremity. Hematology follow up appreciated: [facility]- follows at [facility]    Evaluated by PT/OT and was recommended LIBAN. Patient stable for discharge. 89yo RH F PMHx HTN, HLD, TIA not on ac/ap agents, who presented to ED for change in mental status, slumping over, L facial droop, and possible L hemiparesis. Patient last seen by accompanying family member at 11 AM 2/1/22 with mild confusion. Patient went to sleep and woke up in evening of 2/1/22 asking for help. Was found slumped over, slurred speech and L facial droop. EMS called for concern of stroke. At baseline, able to feed herself, use bathroom herself. Ambulates with walker. Needs assistance with other ADLs. Patient unable to provide ROS. 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 Stable right posterior parietal hemorrhagic   infarct with increased mass effect on the posterior body of the right   lateral ventricle. Subtle 0.3 cm of right to left midline shift similar   when compared to prior exam.    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 Stable right posterior parietal hemorrhagic infarct with increased mass effect on the posterior body of the right lateral ventricle. Subtle 0.3 cm of right to left midline shift similar when compared to prior exam.    CT Head No Cont (02.03.22 @ 11:41) Better definition of right superior cerebellar infarct which appears recent when compared with the prior 2/2/2022. Evolution of what appears to be a hemorrhagic right parietal territory recent infarct with mass effect on the atria and posterior body of the right lateral ventricle. Study is limited by patient positioning with portion of the left brain not visualized    EEG report 2/3/22 Abnormal EEG study. Mild to moderate nonspecific diffuse or multifocal cerebral dysfunction.  No epileptiform pattern or seizure seen.    CT HEAD: (02.02.22 @ 03:59)  Progression of cytotoxic edema and petechial hemorrhage in the right parieto-occipital infarct.. Age-indeterminate ischemia/infarct in the superior right cerebellum.    CTA BRAIN:  Patent intracranial circulation. No large vessel occlusion or significant stenosis..    CTA NECK: Patent cervical vasculature. Possible severe stenosis at the origin of  the right vertebral artery versus artifact from regional motion.    CT Brain Stroke Protocol (02.02.22 @ 00:42)  Hemorrhagic infarct in the right PCA territory.    Impression:  R parieto-occipital lobe petechial hemorrhage and possible infarct with age indeterminate infarct in superior R cerebellum. Possible severe stenosis at the origin of the right vertebral artery versus artifact from regional motion. Mechanism:  Embolic stroke due to new onset Afib vs hypercoagulable state in the setting of covid-19 infection.      LDL 49- continue home dose atorvastatin 40mg PO daily.    ANTITHROMBOTIC THERAPY: ASA started 02/06/22 after stable repeat head CT, plan to started Eliquis on 02/16 for Atrial Fibrillation and could stop the ASA at that time.   D-Dimer increased to 1162 on 02/09, CTA Chest negative for PE and repeat doppler: No evidence of deep venous thrombosis in either lower extremity in the   visualized venous segments.  LE doppler (02/03): No evidence of deep venous thrombosis in either proximal lower extremity. Hematology follow up appreciated: [facility]- follows at [facility]    Evaluated by PT/OT and was recommended LIBAN. Patient stable for discharge.

## 2022-02-07 NOTE — PROGRESS NOTE ADULT - SUBJECTIVE AND OBJECTIVE BOX
DATE OF SERVICE: 02-07-22 @ 11:41    Patient is a 89y old  Female who presents with a chief complaint of concern for stroke (07 Feb 2022 09:20)      SUBJECTIVE / OVERNIGHT EVENTS:  No chest pain. No shortness of breath. No complaints. No events overnight.     MEDICATIONS  (STANDING):  amLODIPine   Tablet 10 milliGRAM(s) Oral daily  aspirin  chewable 81 milliGRAM(s) Oral daily  atorvastatin 40 milliGRAM(s) Oral at bedtime  brimonidine 0.2% Ophthalmic Solution 1 Drop(s) Both EYES two times a day  dextrose 40% Gel 15 Gram(s) Oral once  dextrose 5%. 1000 milliLiter(s) (50 mL/Hr) IV Continuous <Continuous>  dextrose 5%. 1000 milliLiter(s) (100 mL/Hr) IV Continuous <Continuous>  dextrose 50% Injectable 25 Gram(s) IV Push once  dextrose 50% Injectable 12.5 Gram(s) IV Push once  dextrose 50% Injectable 25 Gram(s) IV Push once  glucagon  Injectable 1 milliGRAM(s) IntraMuscular once  heparin   Injectable 5000 Unit(s) SubCutaneous every 12 hours  insulin glargine Injectable (LANTUS) 5 Unit(s) SubCutaneous at bedtime  insulin lispro (ADMELOG) corrective regimen sliding scale   SubCutaneous Before meals and at bedtime  lactated ringers. 1000 milliLiter(s) (75 mL/Hr) IV Continuous <Continuous>  latanoprost 0.005% Ophthalmic Solution 1 Drop(s) Both EYES at bedtime  levothyroxine 88 MICROGram(s) Oral daily    MEDICATIONS  (PRN):  acetaminophen     Tablet .. 650 milliGRAM(s) Oral every 6 hours PRN Mild Pain (1 - 3), Moderate Pain (4 - 6)      Vital Signs Last 24 Hrs  T(C): 37.2 (07 Feb 2022 10:48), Max: 37.4 (06 Feb 2022 18:30)  T(F): 99 (07 Feb 2022 10:48), Max: 99.3 (06 Feb 2022 18:30)  HR: 77 (07 Feb 2022 10:48) (72 - 77)  BP: 148/85 (07 Feb 2022 10:48) (117/58 - 148/85)  BP(mean): 89 (06 Feb 2022 16:00) (84 - 89)  RR: 18 (07 Feb 2022 10:48) (18 - 20)  SpO2: 96% (07 Feb 2022 10:48) (92% - 97%)  CAPILLARY BLOOD GLUCOSE      POCT Blood Glucose.: 140 mg/dL (07 Feb 2022 11:39)  POCT Blood Glucose.: 86 mg/dL (07 Feb 2022 07:51)  POCT Blood Glucose.: 112 mg/dL (06 Feb 2022 22:49)  POCT Blood Glucose.: 93 mg/dL (06 Feb 2022 16:35)  POCT Blood Glucose.: 161 mg/dL (06 Feb 2022 12:27)    I&O's Summary    06 Feb 2022 07:01  -  07 Feb 2022 07:00  --------------------------------------------------------  IN: 0 mL / OUT: 1200 mL / NET: -1200 mL    07 Feb 2022 07:01  -  07 Feb 2022 11:41  --------------------------------------------------------  IN: 240 mL / OUT: 0 mL / NET: 240 mL        PHYSICAL EXAM:  GENERAL: NAD, well-developed  HEAD:  Atraumatic, Normocephalic  EYES: EOMI, PERRLA, conjunctiva and sclera clear  NECK: Supple, No JVD  CHEST/LUNG: Clear to auscultation bilaterally; No wheeze  HEART: Regular rate and rhythm; No murmurs, rubs, or gallops  ABDOMEN: Soft, Nontender, Nondistended; Bowel sounds present  EXTREMITIES:  2+ Peripheral Pulses, No clubbing, cyanosis, or edema  PSYCH: AAOx1  NEUROLOGY: non-focal  SKIN: No rashes or lesions    LABS:                        10.1   8.32  )-----------( 308      ( 06 Feb 2022 01:33 )             32.7     02-06    138  |  102  |  18  ----------------------------<  134<H>  4.1   |  25  |  1.38<H>    Ca    8.8      06 Feb 2022 01:33                RADIOLOGY & ADDITIONAL TESTS:    Imaging Personally Reviewed:    Consultant(s) Notes Reviewed:      Care Discussed with Consultants/Other Providers:

## 2022-02-07 NOTE — PROGRESS NOTE ADULT - PROBLEM SELECTOR PLAN 1
R parieto-occipital lobe petechial hemorrhage and possible infarct with age indeterminate in superior R cerebellum  repeat CT 2/6 - with continued evolution of right cerebellar infarct again noted petechial hemorrhagic transformation, stable right parietal infarct with noted increased mass effect, subtle 0.3cm shift similar to prior study  frequent neuro checks   ASA/Lipitor  s/p vEEG - no seizures  aspiration precautions  orders per neuro team

## 2022-02-07 NOTE — PROGRESS NOTE ADULT - ASSESSMENT
89yo RH F PMHx HTN, HLD, ?TIA not on ac/ap agents, who presents to ED for change in mental status, slumping over, L facial droop, dysarthria and possible L hemiparesis. Vs 97.7F, HR 70, 136/61, RR18, 100%RA. CK wnl. LKW: 11 AM 2/1/22. Not a tPA candidate due to outside window and hemorrhage noted on CT. Not a thrombectomy candidate since no brigitte LVO. CT head: Gyriform hyperdensity in the right parietal and occipital lobes with surrounding cytotoxic edema. Unable to obtain CTA/P during code stroke as patient started drooling and spitting up in CT scan and had episode of desaturation. Patient removed from CT scan and stabilized in trauma room by ED staff.  Repeat CTH/A shows Progression of cytotoxic edema and petechial hemorrhage in the right parieto-occipital infarct. Age-indeterminate ischemia/infarct in the superior right cerebellum. CTA BRAIN: Patent intracranial circulation. No large vessel occlusion or significant stenosis. CTA NECK: Patent cervical vasculature. Possible severe stenosis at the origin of the right vertebral artery versus artifact from regional motion.    Impression:  R parieto-occipital lobe petechial hemorrhage and possible infarct with age indeterminate infarct in superior R cerebellum. Possible severe stenosis at the origin of the right vertebral artery versus artifact from regional motion. Mechanism:  Embolic stroke due to new onset Afib vs hypercoagulable state in the setting of covid-19 infection.     NEURO: neurologically without acute change, continue close monitoring for neurologic deterioration given cerebral edema with mass effect and brain compression, CTH as noted above with continued evolution of right cerebellar infarct again noted petechial hemorrhagic transformation, stable right parietal infarct with noted increased mass effect, subtle 0.3cm shift similar to prior study, close monitoring, serial CTH, avoid hyponatremia, SBP< 160mmHg (goal 120-160mmHg) in setting of petechial hemorrhage, avoid hypotension and rapid fluctuations , LDL 49- continue home dose atorvastatin 40mg PO daily. Patient unable to tolerate MRI, does not need to be done as inpatient. . VEEG (2/3) shows generalized slowing without seizures.  Physical therapy/OT: [facility]     ANTITHROMBOTIC THERAPY:  ASA for now, pending clinical and radiological stability within the next 1-2 weeks will determine timeline for initiation of anticoagulation given atrial fibrillation.     PULMONARY:  CT chest (02/03) Small right pleural effusion, monitor for hypoxia, prior mild hypoxia likely 2nd to atelectasis,  protecting airway, saturating well on room air. Pulmonary follow up appreciated.   Encourage mobility and incentive spirometry.     CARDIOVASCULAR: check TTE, Patient noted to have Atrial Fibrillation on telemonitor on 2/4/22. Dr Keys (cardio) consult appreciated                          SBP goal: 120-160mmhg     GASTROINTESTINAL:  SLP eval with recommendations for Puree diet and Mildly Thick Liquids, tolerating well      RENAL: BUN without acute change /Cr with slight uptrend, CKD 3B maintain adequate hydration,  monitor urine output      Na Goal: Greater than 135     Amin: No      HEMATOLOGY: H/H with anemia but no acute change, Platelets 308, D-Dimer (02/04/22): 522. LE doppler (02/03): No evidence of deep venous thrombosis in either proximal lower extremity. Hematology follow up appreciated: MGUS- follows at [facility]     DVT ppx: Heparin s.c      ID: afebrile, no leukocytosis, UA neg , monitor for si/sx of infection     Other: Dr Sharpe (medicine) f/u appreciated. Plan/Goals of care discussed over the phone with pt's daughter per team prior.    DISPOSITION: [facility] as per PT/OT eval once medical work up is completed       CORE MEASURES:        Admission NIHSS: 16     TPA: [] YES [x] NO      LDL/HDL:p     Depression Screen: 0     Statin Therapy: y      Dysphagia Screen: [x] PASS [] FAIL     Smoking [] YES x[] NO      Afib [] YES [x] NO     Stroke Education [x] YES [] NO    Obtain screening lower extremity venous ultrasound in patients who meet 1 or more of the following criteria as patient is high risk for DVT/PE on admission:   [] History of DVT/PE  [x]Hypercoagulable states (Factor V Leiden, Cancer, OCP, etc. )  [x]Prolonged immobility (hemiplegia/hemiparesis/post operative or any other extended immobilization)  [] Transferred from outside facility (Rehab or Long term care)  [] Age </= to 50

## 2022-02-08 LAB
ANION GAP SERPL CALC-SCNC: 11 MMOL/L — SIGNIFICANT CHANGE UP (ref 5–17)
BUN SERPL-MCNC: 16 MG/DL — SIGNIFICANT CHANGE UP (ref 7–23)
CALCIUM SERPL-MCNC: 9.2 MG/DL — SIGNIFICANT CHANGE UP (ref 8.4–10.5)
CHLORIDE SERPL-SCNC: 101 MMOL/L — SIGNIFICANT CHANGE UP (ref 96–108)
CO2 SERPL-SCNC: 26 MMOL/L — SIGNIFICANT CHANGE UP (ref 22–31)
CREAT SERPL-MCNC: 1.19 MG/DL — SIGNIFICANT CHANGE UP (ref 0.5–1.3)
GLUCOSE BLDC GLUCOMTR-MCNC: 116 MG/DL — HIGH (ref 70–99)
GLUCOSE BLDC GLUCOMTR-MCNC: 129 MG/DL — HIGH (ref 70–99)
GLUCOSE BLDC GLUCOMTR-MCNC: 161 MG/DL — HIGH (ref 70–99)
GLUCOSE BLDC GLUCOMTR-MCNC: 164 MG/DL — HIGH (ref 70–99)
GLUCOSE SERPL-MCNC: 138 MG/DL — HIGH (ref 70–99)
HCT VFR BLD CALC: 36.3 % — SIGNIFICANT CHANGE UP (ref 34.5–45)
HGB BLD-MCNC: 11.2 G/DL — LOW (ref 11.5–15.5)
MCHC RBC-ENTMCNC: 25.4 PG — LOW (ref 27–34)
MCHC RBC-ENTMCNC: 30.9 GM/DL — LOW (ref 32–36)
MCV RBC AUTO: 82.3 FL — SIGNIFICANT CHANGE UP (ref 80–100)
NRBC # BLD: 0 /100 WBCS — SIGNIFICANT CHANGE UP (ref 0–0)
PLATELET # BLD AUTO: 297 K/UL — SIGNIFICANT CHANGE UP (ref 150–400)
POTASSIUM SERPL-MCNC: 4.2 MMOL/L — SIGNIFICANT CHANGE UP (ref 3.5–5.3)
POTASSIUM SERPL-SCNC: 4.2 MMOL/L — SIGNIFICANT CHANGE UP (ref 3.5–5.3)
RBC # BLD: 4.41 M/UL — SIGNIFICANT CHANGE UP (ref 3.8–5.2)
RBC # FLD: 13.8 % — SIGNIFICANT CHANGE UP (ref 10.3–14.5)
SARS-COV-2 RNA SPEC QL NAA+PROBE: SIGNIFICANT CHANGE UP
SODIUM SERPL-SCNC: 138 MMOL/L — SIGNIFICANT CHANGE UP (ref 135–145)
WBC # BLD: 8.05 K/UL — SIGNIFICANT CHANGE UP (ref 3.8–10.5)
WBC # FLD AUTO: 8.05 K/UL — SIGNIFICANT CHANGE UP (ref 3.8–10.5)

## 2022-02-08 RX ADMIN — Medication 88 MICROGRAM(S): at 05:25

## 2022-02-08 RX ADMIN — Medication 81 MILLIGRAM(S): at 12:03

## 2022-02-08 RX ADMIN — ATORVASTATIN CALCIUM 40 MILLIGRAM(S): 80 TABLET, FILM COATED ORAL at 21:48

## 2022-02-08 RX ADMIN — LATANOPROST 1 DROP(S): 0.05 SOLUTION/ DROPS OPHTHALMIC; TOPICAL at 21:49

## 2022-02-08 RX ADMIN — SODIUM CHLORIDE 75 MILLILITER(S): 9 INJECTION, SOLUTION INTRAVENOUS at 06:08

## 2022-02-08 RX ADMIN — Medication 1: at 17:31

## 2022-02-08 RX ADMIN — HEPARIN SODIUM 5000 UNIT(S): 5000 INJECTION INTRAVENOUS; SUBCUTANEOUS at 05:25

## 2022-02-08 RX ADMIN — HEPARIN SODIUM 5000 UNIT(S): 5000 INJECTION INTRAVENOUS; SUBCUTANEOUS at 17:57

## 2022-02-08 RX ADMIN — BRIMONIDINE TARTRATE 1 DROP(S): 2 SOLUTION/ DROPS OPHTHALMIC at 17:57

## 2022-02-08 RX ADMIN — Medication 1: at 22:19

## 2022-02-08 RX ADMIN — INSULIN GLARGINE 5 UNIT(S): 100 INJECTION, SOLUTION SUBCUTANEOUS at 21:48

## 2022-02-08 RX ADMIN — AMLODIPINE BESYLATE 10 MILLIGRAM(S): 2.5 TABLET ORAL at 05:25

## 2022-02-08 RX ADMIN — Medication 650 MILLIGRAM(S): at 21:49

## 2022-02-08 RX ADMIN — BRIMONIDINE TARTRATE 1 DROP(S): 2 SOLUTION/ DROPS OPHTHALMIC at 05:27

## 2022-02-08 NOTE — PROGRESS NOTE ADULT - SUBJECTIVE AND OBJECTIVE BOX
THE PATIENT WAS SEEN AND EXAMINED BY ME WITH THE HOUSESTAFF AND STROKE TEAM DURING MORNING ROUNDS.   HPI:  89yo RH F PMHx HTN, HLD, TIA not on ac/ap agents, who presented to ED for change in mental status, slumping over, L facial droop, and possible L hemiparesis. Patient last seen by accompanying family member at 11 AM 2/1/22 with mild confusion. Patient went to sleep and woke up in evening of 2/1/22 asking for help. Was found slumped over, slurred speech and L facial droop. EMS called for concern of stroke. At baseline, able to feed herself, use bathroom herself. Ambulates with walker. Needs assistance with other ADLs. Patient unable to provide ROS. NIHSS: 16, preMRS: 3, ICH: 2      SUBJECTIVE: No events overnight.  No new neurologic complaints.  ROS reported negative unless otherwise noted.    acetaminophen     Tablet .. 650 milliGRAM(s) Oral every 6 hours PRN  amLODIPine   Tablet 10 milliGRAM(s) Oral daily  aspirin  chewable 81 milliGRAM(s) Oral daily  atorvastatin 40 milliGRAM(s) Oral at bedtime  brimonidine 0.2% Ophthalmic Solution 1 Drop(s) Both EYES two times a day  dextrose 40% Gel 15 Gram(s) Oral once  dextrose 5%. 1000 milliLiter(s) IV Continuous <Continuous>  dextrose 5%. 1000 milliLiter(s) IV Continuous <Continuous>  dextrose 50% Injectable 25 Gram(s) IV Push once  dextrose 50% Injectable 12.5 Gram(s) IV Push once  dextrose 50% Injectable 25 Gram(s) IV Push once  glucagon  Injectable 1 milliGRAM(s) IntraMuscular once  heparin   Injectable 5000 Unit(s) SubCutaneous every 12 hours  insulin glargine Injectable (LANTUS) 5 Unit(s) SubCutaneous at bedtime  insulin lispro (ADMELOG) corrective regimen sliding scale   SubCutaneous Before meals and at bedtime  lactated ringers. 1000 milliLiter(s) IV Continuous <Continuous>  latanoprost 0.005% Ophthalmic Solution 1 Drop(s) Both EYES at bedtime  levothyroxine 88 MICROGram(s) Oral daily      PHYSICAL EXAM:   Vital Signs Last 24 Hrs  T(C): 37 (08 Feb 2022 03:59), Max: 37.2 (07 Feb 2022 10:48)  T(F): 98.6 (08 Feb 2022 03:59), Max: 99 (07 Feb 2022 10:48)  HR: 73 (08 Feb 2022 03:59) (72 - 77)  BP: 148/71 (08 Feb 2022 03:59) (130/64 - 148/85)  BP(mean): --  RR: 18 (08 Feb 2022 03:59) (18 - 18)  SpO2: 94% (08 Feb 2022 03:59) (94% - 96%)      General: No acute distress  HEENT: EOM intact, visual fields full  Abdomen: Soft, nontender, nondistended   Extremities: No edema    NEUROLOGICAL EXAM:  Mental status: Eyes open, awake, alert, oriented to name, age, intermittently location, generates some short phrases, follows most one step commands   Cranial Nerves: Mild left facial palsy, no nystagmus, no dysarthria,  tongue midline  Motor exam: Normal tone, no drift, RUE/RLE 4/5, LUE/LLE 4/5.    Sensation: Intact to light touch   Coordination/ Gait: gait not assessed      LABS:             IMAGING: Reviewed by me.     CT Head No Cont (02.06.22 @ 10:04)   Evolving known right-sided superior cerebellar territory infarct with   mild petechial hemorrhage. Stable right posterior parietal hemorrhagic   infarct with increased mass effect on the posterior body of the right   lateral ventricle. Subtle 0.3 cm of right to left midline shift similar   when compared to prior exam.    CT Head No Cont (02.03.22 @ 11:41) Better definition of right superior cerebellar infarct which appears recent when compared with the prior 2/2/2022. Evolution of what appears to be a hemorrhagic right parietal territory recent infarct with mass effect on the atria and posterior body of the right lateral ventricle. Study is limited by patient positioning with portion of the left brain not visualized    EEG report 2/3/22 Abnormal EEG study. Mild to moderate nonspecific diffuse or multifocal cerebral dysfunction.  No epileptiform pattern or seizure seen.    CT HEAD: (02.02.22 @ 03:59)  Progression of cytotoxic edema and petechial hemorrhage in the right parieto-occipital infarct.. Age-indeterminate ischemia/infarct in the superior right cerebellum.    CTA BRAIN:  Patent intracranial circulation. No large vessel occlusion or significant stenosis..    CTA NECK: Patent cervical vasculature. Possible severe stenosis at the origin of  the right vertebral artery versus artifact from regional motion.    CT Brain Stroke Protocol (02.02.22 @ 00:42)  Hemorrhagic infarct in the right PCA territory.

## 2022-02-08 NOTE — PROGRESS NOTE ADULT - ASSESSMENT
89yo RH F PMHx HTN, HLD, ?TIA not on ac/ap agents, who presents to ED for change in mental status, slumping over, L facial droop, dysarthria and possible L hemiparesis. Vs 97.7F, HR 70, 136/61, RR18, 100%RA. CK wnl. LKW: 11 AM 2/1/22. Not a tPA candidate due to outside window and hemorrhage noted on CT. Not a thrombectomy candidate since no brigitte LVO. CT head: Gyriform hyperdensity in the right parietal and occipital lobes with surrounding cytotoxic edema. Unable to obtain CTA/P during code stroke as patient started drooling and spitting up in CT scan and had episode of desaturation. Patient removed from CT scan and stabilized in trauma room by ED staff.  Repeat CTH/A shows Progression of cytotoxic edema and petechial hemorrhage in the right parieto-occipital infarct. Age-indeterminate ischemia/infarct in the superior right cerebellum. CTA BRAIN: Patent intracranial circulation. No large vessel occlusion or significant stenosis. CTA NECK: Patent cervical vasculature. Possible severe stenosis at the origin of the right vertebral artery versus artifact from regional motion.    Impression:  R parieto-occipital lobe petechial hemorrhage and possible infarct with age indeterminate infarct in superior R cerebellum. Possible severe stenosis at the origin of the right vertebral artery versus artifact from regional motion. Mechanism:  Embolic stroke due to new onset Afib vs hypercoagulable state in the setting of covid-19 infection.     NEURO: neurologically without acute change, continue close monitoring for neurologic deterioration given cerebral edema with mass effect and brain compression, CTH as noted above with continued evolution of right cerebellar infarct again noted petechial hemorrhagic transformation, stable right parietal infarct with noted increased mass effect, subtle 0.3cm shift similar to prior study, close monitoring, serial CTH for any worsening neurological status, avoid hyponatremia, SBP< 160mmHg (goal 120-160mmHg) in setting of petechial hemorrhage, avoid hypotension and rapid fluctuations , LDL 49- continue home dose atorvastatin 40mg PO daily. Patient unable to tolerate MRI, does not need to be done as inpatient. . VEEG (2/3) shows generalized slowing without seizures.  Physical therapy/OT: [facility]     ANTITHROMBOTIC THERAPY:  ASA for now, pending clinical and radiological stability within the next 10-14 days from admission will determine timeline for initiation of anticoagulation given atrial fibrillation.     PULMONARY:  CT chest (02/03) Small right pleural effusion, monitor for hypoxia, prior mild hypoxia likely 2nd to atelectasis,  protecting airway, saturating well on room air. Pulmonary follow up appreciated.   Encourage mobility and incentive spirometry.     CARDIOVASCULAR:  TTE pending results, Patient noted to have Atrial Fibrillation on telemonitor on 2/4/22. Dr Keys (cardio) consult appreciated                          SBP goal: 120-160mmhg     GASTROINTESTINAL:  SLP eval with recommendations for Puree diet and Mildly Thick Liquids, tolerating well      RENAL: BUN without acute change /Cr with slight uptrend, CKD 3B maintain adequate hydration,  repeat BMP pending, monitor urine output      Na Goal: Greater than 135     Amin: No      HEMATOLOGY: H/H with anemia but no acute change, Platelets 308, repeat CBC pending, D-Dimer (02/04/22): 522. LE doppler (02/03): No evidence of deep venous thrombosis in either proximal lower extremity. Hematology follow up appreciated: MGUS- follows at [facility]     DVT ppx: Heparin s.c      ID: afebrile, no leukocytosis, UA neg , monitor for si/sx of infection     Other: Dr Sharpe (medicine) f/u appreciated. Plan/Goals of care discussed over the phone with pt's daughter per team prior.    DISPOSITION: [facility] as per PT/OT eval once medical work up is completed       CORE MEASURES:        Admission NIHSS: 16     TPA: [] YES [x] NO      LDL/HDL:p     Depression Screen: 0     Statin Therapy: y      Dysphagia Screen: [x] PASS [] FAIL     Smoking [] YES x[] NO      Afib [] YES [x] NO     Stroke Education [x] YES [] NO    Obtain screening lower extremity venous ultrasound in patients who meet 1 or more of the following criteria as patient is high risk for DVT/PE on admission:   [] History of DVT/PE  [x]Hypercoagulable states (Factor V Leiden, Cancer, OCP, etc. )  [x]Prolonged immobility (hemiplegia/hemiparesis/post operative or any other extended immobilization)  [] Transferred from outside facility (Rehab or Long term care)  [] Age </= to 50

## 2022-02-08 NOTE — PROGRESS NOTE ADULT - SUBJECTIVE AND OBJECTIVE BOX
Subjective: Patient seen and examined. No new events except as noted.   room air  no chest pain, no sob     REVIEW OF SYSTEMS:    CONSTITUTIONAL: + weakness, fevers or chills  EYES/ENT: No visual changes;  No vertigo or throat pain   NECK: No pain or stiffness  RESPIRATORY: No cough, wheezing, hemoptysis; No shortness of breath  CARDIOVASCULAR: No chest pain or palpitations  GASTROINTESTINAL: No abdominal or epigastric pain. No nausea, vomiting, or hematemesis; No diarrhea or constipation. No melena or hematochezia.  GENITOURINARY: No dysuria, frequency or hematuria  NEUROLOGICAL: No numbness or weakness  SKIN: No itching, burning, rashes, or lesions   All other review of systems is negative unless indicated above.    MEDICATIONS:  MEDICATIONS  (STANDING):  amLODIPine   Tablet 10 milliGRAM(s) Oral daily  aspirin  chewable 81 milliGRAM(s) Oral daily  atorvastatin 40 milliGRAM(s) Oral at bedtime  brimonidine 0.2% Ophthalmic Solution 1 Drop(s) Both EYES two times a day  dextrose 40% Gel 15 Gram(s) Oral once  dextrose 5%. 1000 milliLiter(s) (50 mL/Hr) IV Continuous <Continuous>  dextrose 5%. 1000 milliLiter(s) (100 mL/Hr) IV Continuous <Continuous>  dextrose 50% Injectable 25 Gram(s) IV Push once  dextrose 50% Injectable 12.5 Gram(s) IV Push once  dextrose 50% Injectable 25 Gram(s) IV Push once  glucagon  Injectable 1 milliGRAM(s) IntraMuscular once  heparin   Injectable 5000 Unit(s) SubCutaneous every 12 hours  insulin glargine Injectable (LANTUS) 5 Unit(s) SubCutaneous at bedtime  insulin lispro (ADMELOG) corrective regimen sliding scale   SubCutaneous Before meals and at bedtime  lactated ringers. 1000 milliLiter(s) (75 mL/Hr) IV Continuous <Continuous>  latanoprost 0.005% Ophthalmic Solution 1 Drop(s) Both EYES at bedtime  levothyroxine 88 MICROGram(s) Oral daily      PHYSICAL EXAM:  T(C): 37.6 (02-08-22 @ 10:57), Max: 37.6 (02-08-22 @ 10:57)  HR: 73 (02-08-22 @ 10:57) (72 - 73)  BP: 137/68 (02-08-22 @ 10:57) (130/64 - 148/71)  RR: 18 (02-08-22 @ 10:57) (18 - 18)  SpO2: 95% (02-08-22 @ 10:57) (94% - 95%)  Wt(kg): --  I&O's Summary    07 Feb 2022 07:01  -  08 Feb 2022 07:00  --------------------------------------------------------  IN: 600 mL / OUT: 1150 mL / NET: -550 mL    08 Feb 2022 07:01  -  08 Feb 2022 11:38  --------------------------------------------------------  IN: 240 mL / OUT: 0 mL / NET: 240 mL      Appearance: NAD  HEENT: dry oral mucosa, PERRL, EOMI	  Lymphatic: No lymphadenopathy , no edema  Cardiovascular: Normal S1 S2, No JVD, No murmurs , Peripheral pulses palpable 2+ bilaterally  Respiratory: Lungs clear to auscultation, normal effort 	  Gastrointestinal:  Soft, Non-tender, + BS	  Skin: No rashes, No ecchymoses, No cyanosis, warm to touch  Musculoskeletal: Normal range of motion, normal strength  NEUROLOGICAL EXAM:  Mental status: Eyes open, awake, alert, oriented to name, age, intermittently location, generates some short phrases, follows most one step commands   Cranial Nerves: Mild left facial palsy, no nystagmus, no dysarthria,  tongue midline  Motor exam: Normal tone, no drift, RUE/RLE 4/5, LUE/LLE 4/5.    Sensation: Intact to light touch   Coordination/ Gait: gait not assessed  Ext: No edema      LABS:    CARDIAC MARKERS:    proBNP:   Lipid Profile:   HgA1c:   TSH:     TELEMETRY: controlled Afib 50s-90s    ECG:  	  RADIOLOGY:   DIAGNOSTIC TESTING:  [ ] Echocardiogram:  [ ]  Catheterization:  [ ] Stress Test:    OTHER:

## 2022-02-08 NOTE — PROGRESS NOTE ADULT - SUBJECTIVE AND OBJECTIVE BOX
Follow-up Pulm Progress Note    No new respiratory events overnight.  Denies SOB/CP.     Medications:  MEDICATIONS  (STANDING):  amLODIPine   Tablet 10 milliGRAM(s) Oral daily  aspirin  chewable 81 milliGRAM(s) Oral daily  atorvastatin 40 milliGRAM(s) Oral at bedtime  brimonidine 0.2% Ophthalmic Solution 1 Drop(s) Both EYES two times a day  dextrose 40% Gel 15 Gram(s) Oral once  dextrose 5%. 1000 milliLiter(s) (50 mL/Hr) IV Continuous <Continuous>  dextrose 5%. 1000 milliLiter(s) (100 mL/Hr) IV Continuous <Continuous>  dextrose 50% Injectable 25 Gram(s) IV Push once  dextrose 50% Injectable 12.5 Gram(s) IV Push once  dextrose 50% Injectable 25 Gram(s) IV Push once  glucagon  Injectable 1 milliGRAM(s) IntraMuscular once  heparin   Injectable 5000 Unit(s) SubCutaneous every 12 hours  insulin glargine Injectable (LANTUS) 5 Unit(s) SubCutaneous at bedtime  insulin lispro (ADMELOG) corrective regimen sliding scale   SubCutaneous Before meals and at bedtime  lactated ringers. 1000 milliLiter(s) (75 mL/Hr) IV Continuous <Continuous>  latanoprost 0.005% Ophthalmic Solution 1 Drop(s) Both EYES at bedtime  levothyroxine 88 MICROGram(s) Oral daily    MEDICATIONS  (PRN):  acetaminophen     Tablet .. 650 milliGRAM(s) Oral every 6 hours PRN Mild Pain (1 - 3), Moderate Pain (4 - 6)    Vital Signs Last 24 Hrs  T(C): 37.6 (08 Feb 2022 10:57), Max: 37.6 (08 Feb 2022 10:57)  T(F): 99.6 (08 Feb 2022 10:57), Max: 99.6 (08 Feb 2022 10:57)  HR: 73 (08 Feb 2022 10:57) (72 - 73)  BP: 137/68 (08 Feb 2022 10:57) (130/64 - 148/71)  BP(mean): --  RR: 18 (08 Feb 2022 10:57) (18 - 18)  SpO2: 95% (08 Feb 2022 10:57) (94% - 95%)    02-07 @ 07:01  -  02-08 @ 07:00  --------------------------------------------------------  IN: 600 mL / OUT: 1150 mL / NET: -550 mL    CAPILLARY BLOOD GLUCOSE  POCT Blood Glucose.: 116 mg/dL (08 Feb 2022 07:56)    CULTURES:    Culture - Blood (collected 02-02-22 @ 19:26)  Source: .Blood Blood-Peripheral  Final Report (02-07-22 @ 20:00):    No Growth Final    Culture - Blood (collected 02-02-22 @ 14:51)  Source: .Blood Blood-Peripheral  Final Report (02-07-22 @ 15:00):    No Growth Final    Culture - Urine (collected 02-02-22 @ 03:59)  Source: Catheterized Catheterized  Final Report (02-02-22 @ 23:25):    No growth    Physical Examination:  PULM: Decreased BS   CVS: RRR    RADIOLOGY REVIEWED    CT chest: < from: CT Chest No Cont (02.03.22 @ 11:44) >  FINDINGS:    LYMPH NODES: No lymphadenopathy.    HEART/VASCULATURE: Cardiomegaly. Coronary artery and mitral annular   calcifications. There is no pericardial effusion. Aortic calcifications.    AIRWAYS/LUNGS/PLEURA: Patent central airways. Minimal bibasilar linear   atelectasis. The lungs are otherwise clear. There is a small right   pleural effusion.    UPPER ABDOMEN: 2.9 cm left upper pole renal partially imaged cyst. Tiny   hiatal hernia. Punctate right adrenal calcification.    BONES/SOFT TISSUES: Spinal degenerative changes. Healed bilateral lower   rib fractures.    IMPRESSION:    Small right pleural effusion.      < end of copied text >

## 2022-02-08 NOTE — PROGRESS NOTE ADULT - ASSESSMENT
Patient is a 89yo RH F PMHx HTN, HLD, ?TIA not on ac/ap agents, who presents to ED for change in mental status, slumping over, L facial droop, dysarthria and possible L hemiparesis.    CVA  - workup as per neurology    COVID 19 pna  - pt is not hypoxic  - keep O2 saturation above 92%  - follow informatory markers  - ct chest done  - pulm is following    elevated d dimer  - mason lower ext doppler negative for dvt    dysphagia  - swallow eval  - Puree diet and Mildly Thick Liquids    dvt px  - sq heparin    d.c planning

## 2022-02-08 NOTE — PROGRESS NOTE ADULT - SUBJECTIVE AND OBJECTIVE BOX
DATE OF SERVICE: 02-08-22 @ 10:31    Patient is a 89y old  Female who presents with a chief complaint of concern for stroke (08 Feb 2022 09:53)      SUBJECTIVE / OVERNIGHT EVENTS:  No chest pain. No shortness of breath. No complaints. No events overnight.     MEDICATIONS  (STANDING):  amLODIPine   Tablet 10 milliGRAM(s) Oral daily  aspirin  chewable 81 milliGRAM(s) Oral daily  atorvastatin 40 milliGRAM(s) Oral at bedtime  brimonidine 0.2% Ophthalmic Solution 1 Drop(s) Both EYES two times a day  dextrose 40% Gel 15 Gram(s) Oral once  dextrose 5%. 1000 milliLiter(s) (50 mL/Hr) IV Continuous <Continuous>  dextrose 5%. 1000 milliLiter(s) (100 mL/Hr) IV Continuous <Continuous>  dextrose 50% Injectable 25 Gram(s) IV Push once  dextrose 50% Injectable 12.5 Gram(s) IV Push once  dextrose 50% Injectable 25 Gram(s) IV Push once  glucagon  Injectable 1 milliGRAM(s) IntraMuscular once  heparin   Injectable 5000 Unit(s) SubCutaneous every 12 hours  insulin glargine Injectable (LANTUS) 5 Unit(s) SubCutaneous at bedtime  insulin lispro (ADMELOG) corrective regimen sliding scale   SubCutaneous Before meals and at bedtime  lactated ringers. 1000 milliLiter(s) (75 mL/Hr) IV Continuous <Continuous>  latanoprost 0.005% Ophthalmic Solution 1 Drop(s) Both EYES at bedtime  levothyroxine 88 MICROGram(s) Oral daily    MEDICATIONS  (PRN):  acetaminophen     Tablet .. 650 milliGRAM(s) Oral every 6 hours PRN Mild Pain (1 - 3), Moderate Pain (4 - 6)      Vital Signs Last 24 Hrs  T(C): 37 (08 Feb 2022 03:59), Max: 37.2 (07 Feb 2022 10:48)  T(F): 98.6 (08 Feb 2022 03:59), Max: 99 (07 Feb 2022 10:48)  HR: 73 (08 Feb 2022 03:59) (72 - 77)  BP: 148/71 (08 Feb 2022 03:59) (130/64 - 148/85)  BP(mean): --  RR: 18 (08 Feb 2022 03:59) (18 - 18)  SpO2: 94% (08 Feb 2022 03:59) (94% - 96%)  CAPILLARY BLOOD GLUCOSE      POCT Blood Glucose.: 116 mg/dL (08 Feb 2022 07:56)  POCT Blood Glucose.: 133 mg/dL (07 Feb 2022 23:05)  POCT Blood Glucose.: 154 mg/dL (07 Feb 2022 21:44)  POCT Blood Glucose.: 164 mg/dL (07 Feb 2022 16:55)  POCT Blood Glucose.: 140 mg/dL (07 Feb 2022 11:39)    I&O's Summary    07 Feb 2022 07:01  -  08 Feb 2022 07:00  --------------------------------------------------------  IN: 600 mL / OUT: 1150 mL / NET: -550 mL    08 Feb 2022 07:01  -  08 Feb 2022 10:31  --------------------------------------------------------  IN: 240 mL / OUT: 0 mL / NET: 240 mL        PHYSICAL EXAM:  GENERAL: NAD, well-developed  HEAD:  Atraumatic, Normocephalic  EYES: EOMI, PERRLA, conjunctiva and sclera clear  NECK: Supple, No JVD  CHEST/LUNG: Clear to auscultation bilaterally; No wheeze  HEART: Regular rate and rhythm; No murmurs, rubs, or gallops  ABDOMEN: Soft, Nontender, Nondistended; Bowel sounds present  EXTREMITIES:  2+ Peripheral Pulses, No clubbing, cyanosis, or edema  PSYCH: AAOx1  NEUROLOGY: non-focal  SKIN: No rashes or lesions    LABS:                    RADIOLOGY & ADDITIONAL TESTS:    Imaging Personally Reviewed:    Consultant(s) Notes Reviewed:      Care Discussed with Consultants/Other Providers:

## 2022-02-09 ENCOUNTER — TRANSCRIPTION ENCOUNTER (OUTPATIENT)
Age: 87
End: 2022-02-09

## 2022-02-09 LAB
ANION GAP SERPL CALC-SCNC: 12 MMOL/L — SIGNIFICANT CHANGE UP (ref 5–17)
BUN SERPL-MCNC: 17 MG/DL — SIGNIFICANT CHANGE UP (ref 7–23)
CALCIUM SERPL-MCNC: 9.6 MG/DL — SIGNIFICANT CHANGE UP (ref 8.4–10.5)
CHLORIDE SERPL-SCNC: 103 MMOL/L — SIGNIFICANT CHANGE UP (ref 96–108)
CO2 SERPL-SCNC: 23 MMOL/L — SIGNIFICANT CHANGE UP (ref 22–31)
CREAT SERPL-MCNC: 1.26 MG/DL — SIGNIFICANT CHANGE UP (ref 0.5–1.3)
D DIMER BLD IA.RAPID-MCNC: 1162 NG/ML DDU — HIGH
FOLATE SERPL-MCNC: 9.7 NG/ML — SIGNIFICANT CHANGE UP
GLUCOSE BLDC GLUCOMTR-MCNC: 149 MG/DL — HIGH (ref 70–99)
GLUCOSE BLDC GLUCOMTR-MCNC: 155 MG/DL — HIGH (ref 70–99)
GLUCOSE BLDC GLUCOMTR-MCNC: 204 MG/DL — HIGH (ref 70–99)
GLUCOSE BLDC GLUCOMTR-MCNC: 83 MG/DL — SIGNIFICANT CHANGE UP (ref 70–99)
GLUCOSE SERPL-MCNC: 112 MG/DL — HIGH (ref 70–99)
HCT VFR BLD CALC: 37.5 % — SIGNIFICANT CHANGE UP (ref 34.5–45)
HGB BLD-MCNC: 11.6 G/DL — SIGNIFICANT CHANGE UP (ref 11.5–15.5)
IGA FLD-MCNC: 156 MG/DL — SIGNIFICANT CHANGE UP (ref 84–499)
IGG FLD-MCNC: 2170 MG/DL — HIGH (ref 610–1660)
IGM SERPL-MCNC: 117 MG/DL — SIGNIFICANT CHANGE UP (ref 35–242)
KAPPA LC SER QL IFE: 4.53 MG/DL — HIGH (ref 0.33–1.94)
KAPPA/LAMBDA FREE LIGHT CHAIN RATIO, SERUM: 0.15 RATIO — LOW (ref 0.26–1.65)
LAMBDA LC SER QL IFE: 30.66 MG/DL — HIGH (ref 0.57–2.63)
MAGNESIUM SERPL-MCNC: 1.8 MG/DL — SIGNIFICANT CHANGE UP (ref 1.6–2.6)
MCHC RBC-ENTMCNC: 25.9 PG — LOW (ref 27–34)
MCHC RBC-ENTMCNC: 30.9 GM/DL — LOW (ref 32–36)
MCV RBC AUTO: 83.7 FL — SIGNIFICANT CHANGE UP (ref 80–100)
NRBC # BLD: 0 /100 WBCS — SIGNIFICANT CHANGE UP (ref 0–0)
PHOSPHATE SERPL-MCNC: 3.4 MG/DL — SIGNIFICANT CHANGE UP (ref 2.5–4.5)
PLATELET # BLD AUTO: 280 K/UL — SIGNIFICANT CHANGE UP (ref 150–400)
POTASSIUM SERPL-MCNC: 4.7 MMOL/L — SIGNIFICANT CHANGE UP (ref 3.5–5.3)
POTASSIUM SERPL-SCNC: 4.7 MMOL/L — SIGNIFICANT CHANGE UP (ref 3.5–5.3)
RBC # BLD: 4.48 M/UL — SIGNIFICANT CHANGE UP (ref 3.8–5.2)
RBC # FLD: 14 % — SIGNIFICANT CHANGE UP (ref 10.3–14.5)
SODIUM SERPL-SCNC: 138 MMOL/L — SIGNIFICANT CHANGE UP (ref 135–145)
TSH SERPL-MCNC: 3.23 UIU/ML — SIGNIFICANT CHANGE UP (ref 0.27–4.2)
VIT B12 SERPL-MCNC: 1126 PG/ML — SIGNIFICANT CHANGE UP (ref 232–1245)
WBC # BLD: 7.24 K/UL — SIGNIFICANT CHANGE UP (ref 3.8–10.5)
WBC # FLD AUTO: 7.24 K/UL — SIGNIFICANT CHANGE UP (ref 3.8–10.5)

## 2022-02-09 RX ORDER — AMLODIPINE BESYLATE 2.5 MG/1
1 TABLET ORAL
Qty: 0 | Refills: 0 | DISCHARGE

## 2022-02-09 RX ORDER — INSULIN GLARGINE 100 [IU]/ML
5 INJECTION, SOLUTION SUBCUTANEOUS
Qty: 0 | Refills: 0 | DISCHARGE
Start: 2022-02-09

## 2022-02-09 RX ORDER — TRAMADOL HYDROCHLORIDE 50 MG/1
1 TABLET ORAL
Qty: 0 | Refills: 0 | DISCHARGE

## 2022-02-09 RX ORDER — INSULIN GLULISINE 100 [IU]/ML
8 INJECTION, SOLUTION SUBCUTANEOUS
Qty: 0 | Refills: 0 | DISCHARGE

## 2022-02-09 RX ORDER — INSULIN LISPRO 100/ML
1 VIAL (ML) SUBCUTANEOUS
Qty: 0 | Refills: 0 | DISCHARGE
Start: 2022-02-09

## 2022-02-09 RX ORDER — HEPARIN SODIUM 5000 [USP'U]/ML
5000 INJECTION INTRAVENOUS; SUBCUTANEOUS
Qty: 0 | Refills: 0 | DISCHARGE
Start: 2022-02-09

## 2022-02-09 RX ORDER — ASPIRIN/CALCIUM CARB/MAGNESIUM 324 MG
1 TABLET ORAL
Qty: 0 | Refills: 0 | DISCHARGE
Start: 2022-02-09

## 2022-02-09 RX ORDER — AMLODIPINE BESYLATE 2.5 MG/1
1 TABLET ORAL
Qty: 0 | Refills: 0 | DISCHARGE
Start: 2022-02-09

## 2022-02-09 RX ADMIN — HEPARIN SODIUM 5000 UNIT(S): 5000 INJECTION INTRAVENOUS; SUBCUTANEOUS at 17:54

## 2022-02-09 RX ADMIN — HEPARIN SODIUM 5000 UNIT(S): 5000 INJECTION INTRAVENOUS; SUBCUTANEOUS at 05:42

## 2022-02-09 RX ADMIN — BRIMONIDINE TARTRATE 1 DROP(S): 2 SOLUTION/ DROPS OPHTHALMIC at 05:43

## 2022-02-09 RX ADMIN — Medication 1: at 07:41

## 2022-02-09 RX ADMIN — ATORVASTATIN CALCIUM 40 MILLIGRAM(S): 80 TABLET, FILM COATED ORAL at 22:19

## 2022-02-09 RX ADMIN — Medication 88 MICROGRAM(S): at 05:43

## 2022-02-09 RX ADMIN — LATANOPROST 1 DROP(S): 0.05 SOLUTION/ DROPS OPHTHALMIC; TOPICAL at 22:21

## 2022-02-09 RX ADMIN — INSULIN GLARGINE 5 UNIT(S): 100 INJECTION, SOLUTION SUBCUTANEOUS at 22:43

## 2022-02-09 RX ADMIN — Medication 2: at 17:31

## 2022-02-09 RX ADMIN — BRIMONIDINE TARTRATE 1 DROP(S): 2 SOLUTION/ DROPS OPHTHALMIC at 17:54

## 2022-02-09 RX ADMIN — AMLODIPINE BESYLATE 10 MILLIGRAM(S): 2.5 TABLET ORAL at 05:42

## 2022-02-09 RX ADMIN — Medication 81 MILLIGRAM(S): at 12:35

## 2022-02-09 NOTE — PROGRESS NOTE ADULT - PROBLEM SELECTOR PLAN 1
COVID PCR + 2/2  -CXR grossly clear  -No recent COVID infection per family, vaccinated x2 doses, did not receive booster   -Reportedly with hypoxia to low 90s on admission, O2 sats 96% on RA when seen  -Would monitor off treatment, mild hypoxia likely 2nd to atelectasis rather than COVID PNA   -Trend inflammatory markers  -DVT ppx  -LE duplex negative for DVT  -CT chest with no clear PNA  -DDimer elevated this AM. Suggest CTA chest to r/o PE prior to d/c planning to rehab. COVID PCR + 2/2  -CXR grossly clear  -No recent COVID infection per family, vaccinated x2 doses, did not receive booster   -Reportedly with hypoxia to low 90s on admission, O2 sats 96% on RA when seen  -Would monitor off treatment, mild hypoxia likely 2nd to atelectasis rather than COVID PNA   -Trend inflammatory markers  -DVT ppx  -LE duplex negative for DVT  -CT chest with no clear PNA  -DDimer elevated this AM. Suggest CTA chest to r/o PE, repeat duplex to r/o DVT prior to d/c planning to rehab.

## 2022-02-09 NOTE — DISCHARGE NOTE NURSING/CASE MANAGEMENT/SOCIAL WORK - NSDCPEFALRISK_GEN_ALL_CORE
For information on Fall & Injury Prevention, visit: [email or web address] OR  [email or web address] OR  https://www.cdc.gov/steadi/patient.html

## 2022-02-09 NOTE — PROGRESS NOTE ADULT - SUBJECTIVE AND OBJECTIVE BOX
Follow-up Pulm Progress Note    No new respiratory events overnight.  Denies SOB/CP.     Medications:  MEDICATIONS  (STANDING):  amLODIPine   Tablet 10 milliGRAM(s) Oral daily  aspirin  chewable 81 milliGRAM(s) Oral daily  atorvastatin 40 milliGRAM(s) Oral at bedtime  brimonidine 0.2% Ophthalmic Solution 1 Drop(s) Both EYES two times a day  dextrose 40% Gel 15 Gram(s) Oral once  dextrose 5%. 1000 milliLiter(s) (50 mL/Hr) IV Continuous <Continuous>  dextrose 5%. 1000 milliLiter(s) (100 mL/Hr) IV Continuous <Continuous>  dextrose 50% Injectable 25 Gram(s) IV Push once  dextrose 50% Injectable 12.5 Gram(s) IV Push once  dextrose 50% Injectable 25 Gram(s) IV Push once  glucagon  Injectable 1 milliGRAM(s) IntraMuscular once  heparin   Injectable 5000 Unit(s) SubCutaneous every 12 hours  insulin glargine Injectable (LANTUS) 5 Unit(s) SubCutaneous at bedtime  insulin lispro (ADMELOG) corrective regimen sliding scale   SubCutaneous Before meals and at bedtime  latanoprost 0.005% Ophthalmic Solution 1 Drop(s) Both EYES at bedtime  levothyroxine 88 MICROGram(s) Oral daily    MEDICATIONS  (PRN):  acetaminophen     Tablet .. 650 milliGRAM(s) Oral every 6 hours PRN Mild Pain (1 - 3), Moderate Pain (4 - 6)    Vital Signs Last 24 Hrs  T(C): 37.2 (09 Feb 2022 10:58), Max: 37.8 (08 Feb 2022 20:33)  T(F): 98.9 (09 Feb 2022 10:58), Max: 100 (08 Feb 2022 20:33)  HR: 74 (09 Feb 2022 10:58) (70 - 74)  BP: 127/70 (09 Feb 2022 10:58) (120/68 - 138/70)  BP(mean): --  RR: 18 (09 Feb 2022 10:58) (18 - 18)  SpO2: 95% (09 Feb 2022 10:58) (94% - 100%)    02-08 @ 07:01  -  02-09 @ 07:00  --------------------------------------------------------  IN: 480 mL / OUT: 1000 mL / NET: -520 mL    LABS:                        11.6   7.24  )-----------( 280      ( 09 Feb 2022 06:02 )             37.5     02-09    138  |  103  |  17  ----------------------------<  112<H>  4.7   |  23  |  1.26    Ca    9.6      09 Feb 2022 06:00  Phos  3.4     02-09  Mg     1.8     02-09    CAPILLARY BLOOD GLUCOSE  POCT Blood Glucose.: 83 mg/dL (09 Feb 2022 11:34)    CULTURES:    Culture - Blood (collected 02-02-22 @ 19:26)  Source: .Blood Blood-Peripheral  Final Report (02-07-22 @ 20:00):    No Growth Final    Culture - Blood (collected 02-02-22 @ 14:51)  Source: .Blood Blood-Peripheral  Final Report (02-07-22 @ 15:00):    No Growth Final    Culture - Urine (collected 02-02-22 @ 03:59)  Source: Catheterized Catheterized  Final Report (02-02-22 @ 23:25):    No growth    Physical Examination:  PULM: Clear to auscultation bilaterally, no significant sputum production  CVS: RRR    RADIOLOGY REVIEWED    CT chest: < from: CT Chest No Cont (02.03.22 @ 11:44) >    FINDINGS:    LYMPH NODES: No lymphadenopathy.    HEART/VASCULATURE: Cardiomegaly. Coronary artery and mitral annular   calcifications. There is no pericardial effusion. Aortic calcifications.    AIRWAYS/LUNGS/PLEURA: Patent central airways. Minimal bibasilar linear   atelectasis. The lungs are otherwise clear. There is a small right   pleural effusion.    UPPER ABDOMEN: 2.9 cm left upper pole renal partially imaged cyst. Tiny   hiatal hernia. Punctate right adrenal calcification.    BONES/SOFT TISSUES: Spinal degenerative changes. Healed bilateral lower   rib fractures.    IMPRESSION:    Small right pleural effusion.        --- End of Report ---    < end of copied text >    < from: [facility] Duplex Lower Ext Vein Scan, Bilat (02.03.22 @ 11:54) >    RIGHT:  Normal compressibility of the RIGHT common femoral, femoral and popliteal   veins. Theright tibioperoneal trunk is patent.  Doppler examination shows normal spontaneous and phasic flow.  No RIGHT calf vein thrombosis is detected.    LEFT:  Normal compressibility of the LEFT common femoral, femoral and popliteal   veins. The left tibioperoneal trunk is patent.  Doppler examination shows normal spontaneous and phasic flow.  No LEFT calf vein thrombosis is detected.    IMPRESSION:    No evidence of deep venous thrombosis in either proximal lower extremity.    < end of copied text >

## 2022-02-09 NOTE — PROGRESS NOTE ADULT - ASSESSMENT
Patient is a 89yo RH F PMHx HTN, HLD, ?TIA not on ac/ap agents, who presents to ED for change in mental status, slumping over, L facial droop, dysarthria and possible L hemiparesis.    CVA  - workup as per neurology    COVID 19 pna  - pt is not hypoxic  - keep O2 saturation above 92%  - follow informatory markers  - ct chest done  - pulm is following    elevated d dimer  - mason lower ext doppler negative for dvt    dysphagia  - swallow eval  - Puree diet and Mildly Thick Liquids    dvt px  - sq heparin    d.c planning

## 2022-02-09 NOTE — PROGRESS NOTE ADULT - SUBJECTIVE AND OBJECTIVE BOX
Subjective: Patient seen and examined. No new events except as noted.   no chest pain, no sob   feels good     REVIEW OF SYSTEMS:    CONSTITUTIONAL: + weakness, fevers or chills  EYES/ENT: No visual changes;  No vertigo or throat pain   NECK: No pain or stiffness  RESPIRATORY: No cough, wheezing, hemoptysis; No shortness of breath  CARDIOVASCULAR: No chest pain or palpitations  GASTROINTESTINAL: No abdominal or epigastric pain. No nausea, vomiting, or hematemesis; No diarrhea or constipation. No melena or hematochezia.  GENITOURINARY: No dysuria, frequency or hematuria  NEUROLOGICAL: No numbness or weakness  SKIN: No itching, burning, rashes, or lesions   All other review of systems is negative unless indicated above.    MEDICATIONS:  MEDICATIONS  (STANDING):  amLODIPine   Tablet 10 milliGRAM(s) Oral daily  aspirin  chewable 81 milliGRAM(s) Oral daily  atorvastatin 40 milliGRAM(s) Oral at bedtime  brimonidine 0.2% Ophthalmic Solution 1 Drop(s) Both EYES two times a day  dextrose 40% Gel 15 Gram(s) Oral once  dextrose 5%. 1000 milliLiter(s) (50 mL/Hr) IV Continuous <Continuous>  dextrose 5%. 1000 milliLiter(s) (100 mL/Hr) IV Continuous <Continuous>  dextrose 50% Injectable 25 Gram(s) IV Push once  dextrose 50% Injectable 12.5 Gram(s) IV Push once  dextrose 50% Injectable 25 Gram(s) IV Push once  glucagon  Injectable 1 milliGRAM(s) IntraMuscular once  heparin   Injectable 5000 Unit(s) SubCutaneous every 12 hours  insulin glargine Injectable (LANTUS) 5 Unit(s) SubCutaneous at bedtime  insulin lispro (ADMELOG) corrective regimen sliding scale   SubCutaneous Before meals and at bedtime  latanoprost 0.005% Ophthalmic Solution 1 Drop(s) Both EYES at bedtime  levothyroxine 88 MICROGram(s) Oral daily      PHYSICAL EXAM:  T(C): 37.2 (02-09-22 @ 10:58), Max: 37.8 (02-08-22 @ 20:33)  HR: 74 (02-09-22 @ 10:58) (70 - 74)  BP: 127/70 (02-09-22 @ 10:58) (120/68 - 138/70)  RR: 18 (02-09-22 @ 10:58) (18 - 18)  SpO2: 95% (02-09-22 @ 10:58) (94% - 100%)  Wt(kg): --  I&O's Summary    08 Feb 2022 07:01  -  09 Feb 2022 07:00  --------------------------------------------------------  IN: 480 mL / OUT: 1000 mL / NET: -520 mL    09 Feb 2022 07:01  -  09 Feb 2022 13:55  --------------------------------------------------------  IN: 600 mL / OUT: 0 mL / NET: 600 mL    Appearance: NAD  HEENT: dry oral mucosa, PERRL, EOMI	  Lymphatic: No lymphadenopathy , no edema  Cardiovascular: Normal S1 S2, No JVD, No murmurs , Peripheral pulses palpable 2+ bilaterally  Respiratory: Lungs clear to auscultation, normal effort 	  Gastrointestinal:  Soft, Non-tender, + BS	  Skin: No rashes, No ecchymoses, No cyanosis, warm to touch  NEUROLOGICAL EXAM:  Mental status: Eyes open, awake, alert, oriented to name, age, intermittently location, generates some short phrases, follows most one step commands   Cranial Nerves: Mild left facial palsy, no nystagmus, no dysarthria,  tongue midline  Motor exam: Normal tone, no drift, RUE/RLE 4/5, left hemiparesis LUE/LLE 4/5.    Sensation: Intact to light touch   Coordination/ Gait: gait not assessed  Ext: No edema    LABS:    CARDIAC MARKERS:                   11.6   7.24  )-----------( 280      ( 09 Feb 2022 06:02 )             37.5     02-09    138  |  103  |  17  ----------------------------<  112<H>  4.7   |  23  |  1.26    Ca    9.6      09 Feb 2022 06:00  Phos  3.4     02-09  Mg     1.8     02-09    proBNP:   Lipid Profile:   HgA1c:   TSH: Thyroid Stimulating Hormone, Serum: 3.23 uIU/mL (02-09 @ 08:35)    TELEMETRY: Afib	    ECG:  	  RADIOLOGY:   DIAGNOSTIC TESTING:  [ ] Echocardiogram:  [ ]  Catheterization:  [ ] Stress Test:    OTHER:

## 2022-02-09 NOTE — DISCHARGE NOTE NURSING/CASE MANAGEMENT/SOCIAL WORK - DATE OF LAST VACCINATION
CHF Week 2 Survey      Responses   [facility] patient discharged from? [facility]   Does the patient have one of the following disease processes/diagnoses(primary or secondary)? CHF   Week 2 attempt successful? Yes   Call start time 1503   Call end time 1510   Discharge diagnosis Acute on chronic congestive heart failure, COPD exac   Is patient permission given to speak with other caregiver? No   Meds reviewed with patient/caregiver? Yes   Does the patient have all medications ordered at discharge? Yes   Is the patient taking all medications as directed (includes completed medication regime)? Yes   Does the patient have a primary care provider?  Yes   Comments regarding PCP PCP APPOINTMENT IS NEXT FRIDAY 12/17/21   Has the patient kept scheduled appointments due by today? No   What is preventing the patient from keeping their appointments? --  [Patient reports that she rescheduled her cardiology appt until January that was scheduled this week. ]   Nursing Interventions Educated on importance of keeping appointment   Has home health visited the patient within 72 hours of discharge? N/A   DME comments Wears home O2 and uses [employer].    Pulse Ox monitoring Intermittent   Pulse Ox device source Patient   O2 Sat comments Patient reports that her O2 sats are staying Ok, but did not share readings.    O2 Sat: education provided Monitoring frequency,  Sat levels,  When to seek care   Psychosocial issues? No   Comments Patient reports that she is monitoring home blood pressure and H/A not from high blood pressure.    Did the patient receive a copy of their discharge instructions? Yes   Nursing interventions Reviewed instructions with patient   What is the patient's perception of their health status since discharge? New symptoms unrelated to diagnosis  [Patient reports that she feels like she has a sinus infection with H/A. ]   Nursing interventions Nurse provided patient education,  Advised patient to call provider   [Advised for patient to contact PCP for appt asap. ]   Is the patient able to teach back signs and symptoms of worsening condition? (i.e. weight gain, shortness of air, etc.) Yes   Is the patient/caregiver able to teach back the hierarchy of who to call/visit for symptoms/problems? PCP, Specialist, Home health nurse, Urgent Care, ED, 911 Yes   CHF Week 2 call completed? Yes          Shannon Knox RN   16-Nov-2021

## 2022-02-09 NOTE — PROGRESS NOTE ADULT - SUBJECTIVE AND OBJECTIVE BOX
DATE OF SERVICE: 02-09-22 @ 12:53    Patient is a 89y old  Female who presents with a chief complaint of concern for stroke (09 Feb 2022 08:29)      SUBJECTIVE / OVERNIGHT EVENTS:  No chest pain. No shortness of breath. No complaints. No events overnight.     MEDICATIONS  (STANDING):  amLODIPine   Tablet 10 milliGRAM(s) Oral daily  aspirin  chewable 81 milliGRAM(s) Oral daily  atorvastatin 40 milliGRAM(s) Oral at bedtime  brimonidine 0.2% Ophthalmic Solution 1 Drop(s) Both EYES two times a day  dextrose 40% Gel 15 Gram(s) Oral once  dextrose 5%. 1000 milliLiter(s) (50 mL/Hr) IV Continuous <Continuous>  dextrose 5%. 1000 milliLiter(s) (100 mL/Hr) IV Continuous <Continuous>  dextrose 50% Injectable 25 Gram(s) IV Push once  dextrose 50% Injectable 12.5 Gram(s) IV Push once  dextrose 50% Injectable 25 Gram(s) IV Push once  glucagon  Injectable 1 milliGRAM(s) IntraMuscular once  heparin   Injectable 5000 Unit(s) SubCutaneous every 12 hours  insulin glargine Injectable (LANTUS) 5 Unit(s) SubCutaneous at bedtime  insulin lispro (ADMELOG) corrective regimen sliding scale   SubCutaneous Before meals and at bedtime  latanoprost 0.005% Ophthalmic Solution 1 Drop(s) Both EYES at bedtime  levothyroxine 88 MICROGram(s) Oral daily    MEDICATIONS  (PRN):  acetaminophen     Tablet .. 650 milliGRAM(s) Oral every 6 hours PRN Mild Pain (1 - 3), Moderate Pain (4 - 6)      Vital Signs Last 24 Hrs  T(C): 37.2 (09 Feb 2022 10:58), Max: 37.8 (08 Feb 2022 20:33)  T(F): 98.9 (09 Feb 2022 10:58), Max: 100 (08 Feb 2022 20:33)  HR: 74 (09 Feb 2022 10:58) (70 - 74)  BP: 127/70 (09 Feb 2022 10:58) (120/68 - 138/70)  BP(mean): --  RR: 18 (09 Feb 2022 10:58) (18 - 18)  SpO2: 95% (09 Feb 2022 10:58) (94% - 100%)  CAPILLARY BLOOD GLUCOSE      POCT Blood Glucose.: 83 mg/dL (09 Feb 2022 11:34)  POCT Blood Glucose.: 155 mg/dL (09 Feb 2022 07:35)  POCT Blood Glucose.: 164 mg/dL (08 Feb 2022 21:20)  POCT Blood Glucose.: 161 mg/dL (08 Feb 2022 16:41)    I&O's Summary    08 Feb 2022 07:01  -  09 Feb 2022 07:00  --------------------------------------------------------  IN: 480 mL / OUT: 1000 mL / NET: -520 mL    09 Feb 2022 07:01  -  09 Feb 2022 12:53  --------------------------------------------------------  IN: 600 mL / OUT: 0 mL / NET: 600 mL        PHYSICAL EXAM:  GENERAL: NAD, well-developed  HEAD:  Atraumatic, Normocephalic  EYES: EOMI, PERRLA, conjunctiva and sclera clear  NECK: Supple, No JVD  CHEST/LUNG: Clear to auscultation bilaterally; No wheeze  HEART: Regular rate and rhythm; No murmurs, rubs, or gallops  ABDOMEN: Soft, Nontender, Nondistended; Bowel sounds present  EXTREMITIES:  2+ Peripheral Pulses, No clubbing, cyanosis, or edema  PSYCH: AAOx3  NEUROLOGY: non-focal  SKIN: No rashes or lesions    LABS:                        11.6   7.24  )-----------( 280      ( 09 Feb 2022 06:02 )             37.5     02-09    138  |  103  |  17  ----------------------------<  112<H>  4.7   |  23  |  1.26    Ca    9.6      09 Feb 2022 06:00  Phos  3.4     02-09  Mg     1.8 02-09                RADIOLOGY & ADDITIONAL TESTS:    Imaging Personally Reviewed:    Consultant(s) Notes Reviewed:      Care Discussed with Consultants/Other Providers:

## 2022-02-09 NOTE — PROGRESS NOTE ADULT - ASSESSMENT
89yo RH F PMHx HTN, HLD, ?TIA not on ac/ap agents, who presents to ED for change in mental status, slumping over, L facial droop, dysarthria and possible L hemiparesis. Vs 97.7F, HR 70, 136/61, RR18, 100%RA. CK wnl. LKW: 11 AM 2/1/22. Not a tPA candidate due to outside window and hemorrhage noted on CT. Not a thrombectomy candidate since no brigitte LVO. CT head: Gyriform hyperdensity in the right parietal and occipital lobes with surrounding cytotoxic edema. Unable to obtain CTA/P during code stroke as patient started drooling and spitting up in CT scan and had episode of desaturation. Patient removed from CT scan and stabilized in trauma room by ED staff.  Repeat CTH/A shows Progression of cytotoxic edema and petechial hemorrhage in the right parieto-occipital infarct. Age-indeterminate ischemia/infarct in the superior right cerebellum. CTA BRAIN: Patent intracranial circulation. No large vessel occlusion or significant stenosis. CTA NECK: Patent cervical vasculature. Possible severe stenosis at the origin of the right vertebral artery versus artifact from regional motion.    Impression:  R parieto-occipital lobe petechial hemorrhage and possible infarct with age indeterminate infarct in superior R cerebellum. Possible severe stenosis at the origin of the right vertebral artery versus artifact from regional motion. Mechanism:  Embolic stroke due to new onset Afib vs hypercoagulable state in the setting of covid-19 infection.     NEURO: neurologically without acute change, continue close monitoring for neurologic deterioration given cerebral edema with mass effect and brain compression, CTH as noted above with continued evolution of right cerebellar infarct again noted petechial hemorrhagic transformation, stable right parietal infarct with noted increased mass effect, subtle 0.3cm shift similar to prior study, close monitoring, serial CTH for any worsening neurological status, avoid hyponatremia, SBP< 160mmHg (goal 120-160mmHg) in setting of petechial hemorrhage, avoid hypotension and rapid fluctuations , LDL 49- continue home dose atorvastatin 40mg PO daily. Patient unable to tolerate MRI, does not need to be done as inpatient. . VEEG (2/3) shows generalized slowing without seizures.  Physical therapy/OT: [facility]     ANTITHROMBOTIC THERAPY:  ASA for now, pending clinical and radiological stability within the next 10-14 days from admission will determine timeline for initiation of anticoagulation given atrial fibrillation.     PULMONARY:  CT chest (02/03) Small right pleural effusion, monitor for hypoxia, prior mild hypoxia likely 2nd to atelectasis,  protecting airway, saturating well on room air. Pulmonary follow up appreciated.   Encourage mobility and incentive spirometry.     CARDIOVASCULAR:  TTE shows EF 75/80% Chordale HASMUKH. Mild mitral regurgitation. Mild right atrial enlargement, severe pulmonary pressures. Patient noted to have Atrial Fibrillation on telemonitor on 2/4/22. Dr Keys (cardio) consult appreciated                          SBP goal: 120-160mmhg     GASTROINTESTINAL:  SLP eval with recommendations for Puree diet and Mildly Thick Liquids, tolerating well      RENAL: BUN without acute change /Cr with slight uptrend, CKD 3B maintain adequate hydration, will monitor urine output      Na Goal: Greater than 135     Amin: No      HEMATOLOGY: H/H with anemia but no acute change, Platelets 280,  D-Dimer (02/09/22): 1162. will continue to monitor. LE doppler (02/03): No evidence of deep venous thrombosis in either proximal lower extremity. Hematology follow up appreciated: [facility]- [facility]     DVT ppx: Heparin s.c      ID: afebrile, no leukocytosis, UA neg , monitor for si/sx of infection     Other: Dr Sharpe (medicine) f/u appreciated. Plan/Goals of care discussed over the phone with pt's daughter per team prior.    DISPOSITION: [facility] as per PT/OT eval once bed is available, medical work up is completed       CORE MEASURES:        Admission NIHSS: 16     TPA: [] YES [x] NO      LDL/HDL:p     Depression Screen: 0     Statin Therapy: y      Dysphagia Screen: [x] PASS [] FAIL     Smoking [] YES x[] NO      Afib [] YES [x] NO     Stroke Education [x] YES [] NO    Obtain screening lower extremity venous ultrasound in patients who meet 1 or more of the following criteria as patient is high risk for DVT/PE on admission:   [] History of DVT/PE  [x]Hypercoagulable states (Factor V Leiden, Cancer, OCP, etc. )  [x]Prolonged immobility (hemiplegia/hemiparesis/post operative or any other extended immobilization)  [] Transferred from outside facility (Rehab or Long term care)  [] Age </= to 50

## 2022-02-09 NOTE — PROGRESS NOTE ADULT - ATTENDING COMMENTS
Arnol Zacarias MD  Vascular Neurology    seen in [facility] ICU
Arnol Zacarias MD  Vascular Neurology     in [facility] ICU
Arnol Zacarias MD  Vascular Neurology  Office: [phone]     seen in COVID ICU  okay to tx to covid floor with q4hr neuro checks.
Arnol Zacarias MD  Vascular Neurology
fu cta chest

## 2022-02-09 NOTE — PROGRESS NOTE ADULT - SUBJECTIVE AND OBJECTIVE BOX
THE PATIENT WAS SEEN AND EXAMINED BY ME WITH THE HOUSESTAFF AND STROKE TEAM DURING MORNING ROUNDS.   HPI:  89yo RH F PMHx HTN, HLD, TIA not on ac/ap agents, who presented to ED for change in mental status, slumping over, L facial droop, and possible L hemiparesis. Patient last seen by accompanying family member at 11 AM 2/1/22 with mild confusion. Patient went to sleep and woke up in evening of 2/1/22 asking for help. Was found slumped over, slurred speech and L facial droop. EMS called for concern of stroke. At baseline, able to feed herself, use bathroom herself. Ambulates with walker. Needs assistance with other ADLs. Patient unable to provide ROS. NIHSS: 16, preMRS: 3, ICH: 2       SUBJECTIVE: No events overnight.  No new neurologic complaints, ROS reported negative unless otherwise noted.      acetaminophen     Tablet .. 650 milliGRAM(s) Oral every 6 hours PRN  amLODIPine   Tablet 10 milliGRAM(s) Oral daily  aspirin  chewable 81 milliGRAM(s) Oral daily  atorvastatin 40 milliGRAM(s) Oral at bedtime  brimonidine 0.2% Ophthalmic Solution 1 Drop(s) Both EYES two times a day  dextrose 40% Gel 15 Gram(s) Oral once  dextrose 5%. 1000 milliLiter(s) IV Continuous <Continuous>  dextrose 5%. 1000 milliLiter(s) IV Continuous <Continuous>  dextrose 50% Injectable 25 Gram(s) IV Push once  dextrose 50% Injectable 12.5 Gram(s) IV Push once  dextrose 50% Injectable 25 Gram(s) IV Push once  glucagon  Injectable 1 milliGRAM(s) IntraMuscular once  heparin   Injectable 5000 Unit(s) SubCutaneous every 12 hours  insulin glargine Injectable (LANTUS) 5 Unit(s) SubCutaneous at bedtime  insulin lispro (ADMELOG) corrective regimen sliding scale   SubCutaneous Before meals and at bedtime  lactated ringers. 1000 milliLiter(s) IV Continuous <Continuous>  latanoprost 0.005% Ophthalmic Solution 1 Drop(s) Both EYES at bedtime  levothyroxine 88 MICROGram(s) Oral daily      PHYSICAL EXAM:   Vital Signs Last 24 Hrs  T(C): 37 (09 Feb 2022 05:00), Max: 37.8 (08 Feb 2022 20:33)  T(F): 98.6 (09 Feb 2022 05:00), Max: 100 (08 Feb 2022 20:33)  HR: 70 (09 Feb 2022 05:00) (70 - 73)  BP: 138/70 (09 Feb 2022 05:00) (120/68 - 138/70)  BP(mean): --  RR: 18 (09 Feb 2022 05:00) (18 - 18)  SpO2: 100% (09 Feb 2022 05:00) (94% - 100%)    General: No acute distress  HEENT: EOM intact, visual fields full  Abdomen: Soft, nontender, nondistended   Extremities: No edema    NEUROLOGICAL EXAM:  Mental status: Eyes open, awake, alert, oriented to name, age, intermittently location, generates some short phrases, follows most one step commands   Cranial Nerves: Mild left facial palsy, no nystagmus, no dysarthria,  tongue midline  Motor exam: Normal tone, no drift, RUE/RLE 4/5, left hemiparesis LUE/LLE 4/5.    Sensation: Intact to light touch   Coordination/ Gait: gait not assessed      LABS:                        11.6   7.24  )-----------( 280      ( 09 Feb 2022 06:02 )             37.5    02-09    138  |  103  |  17  ----------------------------<  112<H>  4.7   |  23  |  1.26    Ca    9.6      09 Feb 2022 06:00  Phos  3.4     02-09  Mg     1.8     02-09      IMAGING: Reviewed by me.     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 Stable right posterior parietal hemorrhagic infarct with increased mass effect on the posterior body of the right lateral ventricle. Subtle 0.3 cm of right to left midline shift similar when compared to prior exam.    CT Head No Cont (02.03.22 @ 11:41) Better definition of right superior cerebellar infarct which appears recent when compared with the prior 2/2/2022. Evolution of what appears to be a hemorrhagic right parietal territory recent infarct with mass effect on the atria and posterior body of the right lateral ventricle. Study is limited by patient positioning with portion of the left brain not visualized    EEG report 2/3/22 Abnormal EEG study. Mild to moderate nonspecific diffuse or multifocal cerebral dysfunction.  No epileptiform pattern or seizure seen.    CT HEAD: (02.02.22 @ 03:59)  Progression of cytotoxic edema and petechial hemorrhage in the right parieto-occipital infarct.. Age-indeterminate ischemia/infarct in the superior right cerebellum.    CTA BRAIN:  Patent intracranial circulation. No large vessel occlusion or significant stenosis..    CTA NECK: Patent cervical vasculature. Possible severe stenosis at the origin of  the right vertebral artery versus artifact from regional motion.    CT Brain Stroke Protocol (02.02.22 @ 00:42)  Hemorrhagic infarct in the right PCA territory.

## 2022-02-09 NOTE — DISCHARGE NOTE NURSING/CASE MANAGEMENT/SOCIAL WORK - PATIENT PORTAL LINK FT
You can access the FollowMyHealth Patient Portal offered by [facility] by registering at the following website: [email or web address]. By joining [facility]’s FollowMyHealth portal, you will also be able to view your health information using other applications (apps) compatible with our system.

## 2022-02-09 NOTE — PROGRESS NOTE ADULT - ASSESSMENT
Patient is a 89yo F PMHx HTN, HLD, ?TIA not on ac/ap agents, who presents to ED for change in mental status, slumping over, L facial droop, and possible L hemiparesis.    CTH:  R parieto-occipital lobe petechial hemorrhage and possible infarct with age indeterminate infarct in superior R cerebellum. Possible severe stenosis at the origin of the right vertebral artery versus artifact from regional motion.

## 2022-02-10 VITALS
DIASTOLIC BLOOD PRESSURE: 78 MMHG | TEMPERATURE: 99 F | SYSTOLIC BLOOD PRESSURE: 155 MMHG | RESPIRATION RATE: 18 BRPM | OXYGEN SATURATION: 96 % | HEART RATE: 71 BPM

## 2022-02-10 LAB
ANION GAP SERPL CALC-SCNC: 14 MMOL/L — SIGNIFICANT CHANGE UP (ref 5–17)
BUN SERPL-MCNC: 17 MG/DL — SIGNIFICANT CHANGE UP (ref 7–23)
CALCIUM SERPL-MCNC: 9.5 MG/DL — SIGNIFICANT CHANGE UP (ref 8.4–10.5)
CHLORIDE SERPL-SCNC: 102 MMOL/L — SIGNIFICANT CHANGE UP (ref 96–108)
CO2 SERPL-SCNC: 22 MMOL/L — SIGNIFICANT CHANGE UP (ref 22–31)
CREAT SERPL-MCNC: 1.09 MG/DL — SIGNIFICANT CHANGE UP (ref 0.5–1.3)
GLUCOSE BLDC GLUCOMTR-MCNC: 106 MG/DL — HIGH (ref 70–99)
GLUCOSE BLDC GLUCOMTR-MCNC: 124 MG/DL — HIGH (ref 70–99)
GLUCOSE BLDC GLUCOMTR-MCNC: 138 MG/DL — HIGH (ref 70–99)
GLUCOSE BLDC GLUCOMTR-MCNC: 99 MG/DL — SIGNIFICANT CHANGE UP (ref 70–99)
GLUCOSE SERPL-MCNC: 127 MG/DL — HIGH (ref 70–99)
HCT VFR BLD CALC: 40.7 % — SIGNIFICANT CHANGE UP (ref 34.5–45)
HGB BLD-MCNC: 12.7 G/DL — SIGNIFICANT CHANGE UP (ref 11.5–15.5)
MCHC RBC-ENTMCNC: 25.5 PG — LOW (ref 27–34)
MCHC RBC-ENTMCNC: 31.2 GM/DL — LOW (ref 32–36)
MCV RBC AUTO: 81.7 FL — SIGNIFICANT CHANGE UP (ref 80–100)
NRBC # BLD: 0 /100 WBCS — SIGNIFICANT CHANGE UP (ref 0–0)
PLATELET # BLD AUTO: 188 K/UL — SIGNIFICANT CHANGE UP (ref 150–400)
POTASSIUM SERPL-MCNC: 4.7 MMOL/L — SIGNIFICANT CHANGE UP (ref 3.5–5.3)
POTASSIUM SERPL-SCNC: 4.7 MMOL/L — SIGNIFICANT CHANGE UP (ref 3.5–5.3)
RBC # BLD: 4.98 M/UL — SIGNIFICANT CHANGE UP (ref 3.8–5.2)
RBC # FLD: 14 % — SIGNIFICANT CHANGE UP (ref 10.3–14.5)
SARS-COV-2 RNA SPEC QL NAA+PROBE: SIGNIFICANT CHANGE UP
SODIUM SERPL-SCNC: 138 MMOL/L — SIGNIFICANT CHANGE UP (ref 135–145)
WBC # BLD: 7.24 K/UL — SIGNIFICANT CHANGE UP (ref 3.8–10.5)
WBC # FLD AUTO: 7.24 K/UL — SIGNIFICANT CHANGE UP (ref 3.8–10.5)

## 2022-02-10 PROCEDURE — 71275 CT ANGIOGRAPHY CHEST: CPT | Mod: 26

## 2022-02-10 PROCEDURE — 95711 VEEG 2-12 HR UNMONITORED: CPT

## 2022-02-10 PROCEDURE — 84295 ASSAY OF SERUM SODIUM: CPT

## 2022-02-10 PROCEDURE — 83605 ASSAY OF LACTIC ACID: CPT

## 2022-02-10 PROCEDURE — U0003: CPT

## 2022-02-10 PROCEDURE — 92610 EVALUATE SWALLOWING FUNCTION: CPT

## 2022-02-10 PROCEDURE — 84100 ASSAY OF PHOSPHORUS: CPT

## 2022-02-10 PROCEDURE — 93308 TTE F-UP OR LMTD: CPT

## 2022-02-10 PROCEDURE — 82962 GLUCOSE BLOOD TEST: CPT

## 2022-02-10 PROCEDURE — 71045 X-RAY EXAM CHEST 1 VIEW: CPT

## 2022-02-10 PROCEDURE — 86140 C-REACTIVE PROTEIN: CPT

## 2022-02-10 PROCEDURE — 82550 ASSAY OF CK (CPK): CPT

## 2022-02-10 PROCEDURE — 97166 OT EVAL MOD COMPLEX 45 MIN: CPT

## 2022-02-10 PROCEDURE — 0042T: CPT

## 2022-02-10 PROCEDURE — 85652 RBC SED RATE AUTOMATED: CPT

## 2022-02-10 PROCEDURE — 85014 HEMATOCRIT: CPT

## 2022-02-10 PROCEDURE — 92526 ORAL FUNCTION THERAPY: CPT

## 2022-02-10 PROCEDURE — 87040 BLOOD CULTURE FOR BACTERIA: CPT

## 2022-02-10 PROCEDURE — 83735 ASSAY OF MAGNESIUM: CPT

## 2022-02-10 PROCEDURE — 85379 FIBRIN DEGRADATION QUANT: CPT

## 2022-02-10 PROCEDURE — 85025 COMPLETE CBC W/AUTO DIFF WBC: CPT

## 2022-02-10 PROCEDURE — 80053 COMPREHEN METABOLIC PANEL: CPT

## 2022-02-10 PROCEDURE — 85027 COMPLETE CBC AUTOMATED: CPT

## 2022-02-10 PROCEDURE — 95714 VEEG EA 12-26 HR UNMNTR: CPT

## 2022-02-10 PROCEDURE — 93970 EXTREMITY STUDY: CPT

## 2022-02-10 PROCEDURE — 85384 FIBRINOGEN ACTIVITY: CPT

## 2022-02-10 PROCEDURE — 84443 ASSAY THYROID STIM HORMONE: CPT

## 2022-02-10 PROCEDURE — 97162 PT EVAL MOD COMPLEX 30 MIN: CPT

## 2022-02-10 PROCEDURE — 81001 URINALYSIS AUTO W/SCOPE: CPT

## 2022-02-10 PROCEDURE — 85730 THROMBOPLASTIN TIME PARTIAL: CPT

## 2022-02-10 PROCEDURE — 70450 CT HEAD/BRAIN W/O DYE: CPT | Mod: MA

## 2022-02-10 PROCEDURE — 82435 ASSAY OF BLOOD CHLORIDE: CPT

## 2022-02-10 PROCEDURE — 70498 CT ANGIOGRAPHY NECK: CPT | Mod: MA

## 2022-02-10 PROCEDURE — U0005: CPT

## 2022-02-10 PROCEDURE — 74018 RADEX ABDOMEN 1 VIEW: CPT

## 2022-02-10 PROCEDURE — 93970 EXTREMITY STUDY: CPT | Mod: 26

## 2022-02-10 PROCEDURE — 36415 COLL VENOUS BLD VENIPUNCTURE: CPT

## 2022-02-10 PROCEDURE — 87086 URINE CULTURE/COLONY COUNT: CPT

## 2022-02-10 PROCEDURE — 97530 THERAPEUTIC ACTIVITIES: CPT

## 2022-02-10 PROCEDURE — 82784 ASSAY IGA/IGD/IGG/IGM EACH: CPT

## 2022-02-10 PROCEDURE — 71250 CT THORAX DX C-: CPT

## 2022-02-10 PROCEDURE — 82746 ASSAY OF FOLIC ACID SERUM: CPT

## 2022-02-10 PROCEDURE — 97110 THERAPEUTIC EXERCISES: CPT

## 2022-02-10 PROCEDURE — 99291 CRITICAL CARE FIRST HOUR: CPT

## 2022-02-10 PROCEDURE — 80048 BASIC METABOLIC PNL TOTAL CA: CPT

## 2022-02-10 PROCEDURE — 82330 ASSAY OF CALCIUM: CPT

## 2022-02-10 PROCEDURE — 70496 CT ANGIOGRAPHY HEAD: CPT | Mod: MA

## 2022-02-10 PROCEDURE — 96374 THER/PROPH/DIAG INJ IV PUSH: CPT

## 2022-02-10 PROCEDURE — 85018 HEMOGLOBIN: CPT

## 2022-02-10 PROCEDURE — 84484 ASSAY OF TROPONIN QUANT: CPT

## 2022-02-10 PROCEDURE — 71275 CT ANGIOGRAPHY CHEST: CPT

## 2022-02-10 PROCEDURE — 80061 LIPID PANEL: CPT

## 2022-02-10 PROCEDURE — 82803 BLOOD GASES ANY COMBINATION: CPT

## 2022-02-10 PROCEDURE — 83036 HEMOGLOBIN GLYCOSYLATED A1C: CPT

## 2022-02-10 PROCEDURE — 82947 ASSAY GLUCOSE BLOOD QUANT: CPT

## 2022-02-10 PROCEDURE — 85610 PROTHROMBIN TIME: CPT

## 2022-02-10 PROCEDURE — 92523 SPEECH SOUND LANG COMPREHEN: CPT

## 2022-02-10 PROCEDURE — 86769 SARS-COV-2 COVID-19 ANTIBODY: CPT

## 2022-02-10 PROCEDURE — 95700 EEG CONT REC W/VID EEG TECH: CPT

## 2022-02-10 PROCEDURE — 93321 DOPPLER ECHO F-UP/LMTD STD: CPT

## 2022-02-10 PROCEDURE — 82607 VITAMIN B-12: CPT

## 2022-02-10 PROCEDURE — 84132 ASSAY OF SERUM POTASSIUM: CPT

## 2022-02-10 PROCEDURE — 93005 ELECTROCARDIOGRAM TRACING: CPT

## 2022-02-10 PROCEDURE — 82728 ASSAY OF FERRITIN: CPT

## 2022-02-10 RX ADMIN — BRIMONIDINE TARTRATE 1 DROP(S): 2 SOLUTION/ DROPS OPHTHALMIC at 17:24

## 2022-02-10 RX ADMIN — HEPARIN SODIUM 5000 UNIT(S): 5000 INJECTION INTRAVENOUS; SUBCUTANEOUS at 17:25

## 2022-02-10 RX ADMIN — BRIMONIDINE TARTRATE 1 DROP(S): 2 SOLUTION/ DROPS OPHTHALMIC at 05:25

## 2022-02-10 RX ADMIN — HEPARIN SODIUM 5000 UNIT(S): 5000 INJECTION INTRAVENOUS; SUBCUTANEOUS at 05:25

## 2022-02-10 RX ADMIN — Medication 81 MILLIGRAM(S): at 12:26

## 2022-02-10 RX ADMIN — Medication 88 MICROGRAM(S): at 05:26

## 2022-02-10 RX ADMIN — AMLODIPINE BESYLATE 10 MILLIGRAM(S): 2.5 TABLET ORAL at 05:26

## 2022-02-10 NOTE — PROGRESS NOTE ADULT - ASSESSMENT
Hematology/Oncology consulted on this 89 year old female with past medical history of HTN, HLD and TIA known to [facility] and follows with Dr Batista for MGUS on 10/6/21 with new complaints of a 23 pound weight loss. She presented to [facility] ED with complaints of difficulty standing, leaning towards her left side and with a left facial droop.   CT head revealed a  R parieto-occipital lobe petechial hemorrhage and possible infarct with age indeterminate infarct in superior R cerebellum. Possible severe stenosis at the origin of the right vertebral artery versus artifact from regional motion. During presentation she was found to be COVID positive.    MGUS  --Follows with Dr Batista at [facility]  --Last visit 10/6/21  --Repeated QI and FLC during admission - although abnormal, stable when compared with levels taken on 10/6/2021    CVA  --Management per Neurology    COVID-19  --Afebrile  --02 sat 99%, NC 2L  --No need to start Remdesivir  --D dimer increased to 1162  --CTA 2/10/2022 negative    Patient will follow with Dr Batista at [facility] after discharge    Thank you for allowing us to participate in Mrs Esparza's care    Anderson Batista PA-C  Hematology/Oncology  [facility]   [phone] (office)  [phone] (alt office)  Evenings and weekends please call MD on call or office

## 2022-02-10 NOTE — PROGRESS NOTE ADULT - PROVIDER SPECIALTY LIST ADULT
Internal Medicine
Neurology
Internal Medicine
Neurology
Pulmonology
Internal Medicine
Neurology
Neurology
Cardiology
Heme/Onc
Cardiology
Cardiology
Pulmonology
Pulmonology
Cardiology
Pulmonology

## 2022-02-10 NOTE — PROGRESS NOTE ADULT - ASSESSMENT
Patient is a 89yo RH F PMHx HTN, HLD, ?TIA not on ac/ap agents, who presents to ED for change in mental status, slumping over, L facial droop, dysarthria and possible L hemiparesis.    CVA  - workup as per neurology completed    COVID 19 pna  - pt is not hypoxic  - keep O2 saturation above 92%  - follow informatory markers  - ct chest done  - pulm is following    elevated d dimer  - mason lower ext doppler negative for dvt  - CT PE was negative    dysphagia  - swallow eval  - Puree diet and Mildly Thick Liquids    dvt px  - sq heparin    d.c planning

## 2022-02-10 NOTE — PROGRESS NOTE ADULT - PROBLEM SELECTOR PLAN 3
SBP goal 120-160 as per neuro   amlodipine 10mg PO daily
R parieto-occipital lobe petechial hemorrhage and possible infarct with age indeterminate in superior R cerebellum  -Per neuro.
R parieto-occipital lobe petechial hemorrhage and possible infarct with age indeterminate in superior R cerebellum  -Pending repeat CT head  -Per neuro.
R parieto-occipital lobe petechial hemorrhage and possible infarct with age indeterminate in superior R cerebellum  -Per neuro.
SBP goal 120-160 as per neuro   amlodipine 10mg PO daily
SBP goal 120-150 as per neuro   amlodipine 5mg PO daily
SBP goal 120-160 as per neuro   stable  c/w amlodipine 10mg PO daily
R parieto-occipital lobe petechial hemorrhage and possible infarct with age indeterminate in superior R cerebellum  -Per neuro.

## 2022-02-10 NOTE — PROGRESS NOTE ADULT - SUBJECTIVE AND OBJECTIVE BOX
THE PATIENT WAS SEEN AND EXAMINED BY ME WITH THE HOUSESTAFF AND STROKE TEAM DURING MORNING ROUNDS.   HPI:  89yo RH F PMHx HTN, HLD, TIA not on ac/ap agents, who presented to ED for change in mental status, slumping over, L facial droop, and possible L hemiparesis. Patient last seen by accompanying family member at 11 AM 2/1/22 with mild confusion. Patient went to sleep and woke up in evening of 2/1/22 asking for help. Was found slumped over, slurred speech and L facial droop. EMS called for concern of stroke. At baseline, able to feed herself, use bathroom herself. Ambulates with walker. Needs assistance with other ADLs. Patient unable to provide ROS. NIHSS: 16, preMRS: 3, ICH: 2         SUBJECTIVE: No events overnight.  No new neurologic complaints.  ROS reported negative unless otherwise noted.    acetaminophen     Tablet .. 650 milliGRAM(s) Oral every 6 hours PRN  amLODIPine   Tablet 10 milliGRAM(s) Oral daily  aspirin  chewable 81 milliGRAM(s) Oral daily  atorvastatin 40 milliGRAM(s) Oral at bedtime  brimonidine 0.2% Ophthalmic Solution 1 Drop(s) Both EYES two times a day  dextrose 40% Gel 15 Gram(s) Oral once  dextrose 5%. 1000 milliLiter(s) IV Continuous <Continuous>  dextrose 5%. 1000 milliLiter(s) IV Continuous <Continuous>  dextrose 50% Injectable 25 Gram(s) IV Push once  dextrose 50% Injectable 12.5 Gram(s) IV Push once  dextrose 50% Injectable 25 Gram(s) IV Push once  glucagon  Injectable 1 milliGRAM(s) IntraMuscular once  heparin   Injectable 5000 Unit(s) SubCutaneous every 12 hours  insulin glargine Injectable (LANTUS) 5 Unit(s) SubCutaneous at bedtime  insulin lispro (ADMELOG) corrective regimen sliding scale   SubCutaneous Before meals and at bedtime  latanoprost 0.005% Ophthalmic Solution 1 Drop(s) Both EYES at bedtime  levothyroxine 88 MICROGram(s) Oral daily      PHYSICAL EXAM:   Vital Signs Last 24 Hrs  T(C): 36.6 (10 Feb 2022 04:00), Max: 37.5 (09 Feb 2022 20:16)  T(F): 97.8 (10 Feb 2022 04:00), Max: 99.5 (09 Feb 2022 20:16)  HR: 76 (10 Feb 2022 04:00) (72 - 76)  BP: 157/73 (10 Feb 2022 04:00) (118/68 - 157/73)  BP(mean): --  RR: 18 (10 Feb 2022 04:00) (18 - 19)  SpO2: 95% (10 Feb 2022 04:00) (95% - 98%)    General: No acute distress  HEENT: EOM intact, visual fields full  Abdomen: Soft, nontender, nondistended   Extremities: No edema    NEUROLOGICAL EXAM:  Mental status: Eyes open, awake, alert, oriented to name, age, intermittently location, generates some short phrases, follows most one step commands   Cranial Nerves: Mild left facial palsy, no nystagmus, no dysarthria,  tongue midline  Motor exam: Normal tone, no drift, RUE/RLE 4/5, left hemiparesis LUE/LLE 4/5.    Sensation: Intact to light touch   Coordination/ Gait: gait not assessed    LABS:                        12.7   7.24  )-----------( 188      ( 10 Feb 2022 06:25 )             40.7    02-10    138  |  102  |  17  ----------------------------<  127<H>  4.7   |  22  |  1.09    Ca    9.5      10 Feb 2022 06:25  Phos  3.4     02-09  Mg     1.8     02-09          IMAGING: Reviewed by me.   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 Stable right posterior parietal hemorrhagic infarct with increased mass effect on the posterior body of the right lateral ventricle. Subtle 0.3 cm of right to left midline shift similar when compared to prior exam.    CT Head No Cont (02.03.22 @ 11:41) Better definition of right superior cerebellar infarct which appears recent when compared with the prior 2/2/2022. Evolution of what appears to be a hemorrhagic right parietal territory recent infarct with mass effect on the atria and posterior body of the right lateral ventricle. Study is limited by patient positioning with portion of the left brain not visualized    EEG report 2/3/22 Abnormal EEG study. Mild to moderate nonspecific diffuse or multifocal cerebral dysfunction.  No epileptiform pattern or seizure seen.    CT HEAD: (02.02.22 @ 03:59)  Progression of cytotoxic edema and petechial hemorrhage in the right parieto-occipital infarct.. Age-indeterminate ischemia/infarct in the superior right cerebellum.    CTA BRAIN:  Patent intracranial circulation. No large vessel occlusion or significant stenosis..    CTA NECK: Patent cervical vasculature. Possible severe stenosis at the origin of  the right vertebral artery versus artifact from regional motion.    CT Brain Stroke Protocol (02.02.22 @ 00:42)  Hemorrhagic infarct in the right PCA territory.

## 2022-02-10 NOTE — PROGRESS NOTE ADULT - ASSESSMENT
89 y/o F with PMH of HTN, HLD, ?TIA,. Presents to ED for change in mental status, L facial droop, L hemiparesis. Found to have R parieto-occipital lobe petechial hemorrhage and possible infarct with age indeterminate in superior R cerebellum. COVID PCR on admission. Pulmonary called to consult for hypoxia, reportedly spo2 low 90s on arrival. CXR grossly clear. Spoke with daughter Feli (), notes mother is COVID vaccinated x2 doses, did not receive booster. Initially with mild hypoxia, now breathing comfortably on RA.

## 2022-02-10 NOTE — PROGRESS NOTE ADULT - ASSESSMENT
89yo RH F PMHx HTN, HLD, ?TIA not on ac/ap agents, who presents to ED for change in mental status, slumping over, L facial droop, dysarthria and possible L hemiparesis. Vs 97.7F, HR 70, 136/61, RR18, 100%RA. CK wnl. LKW: 11 AM 2/1/22. Not a tPA candidate due to outside window and hemorrhage noted on CT. Not a thrombectomy candidate since no brigitte LVO. CT head: Gyriform hyperdensity in the right parietal and occipital lobes with surrounding cytotoxic edema. Unable to obtain CTA/P during code stroke as patient started drooling and spitting up in CT scan and had episode of desaturation. Patient removed from CT scan and stabilized in trauma room by ED staff.  Repeat CTH/A shows Progression of cytotoxic edema and petechial hemorrhage in the right parieto-occipital infarct. Age-indeterminate ischemia/infarct in the superior right cerebellum. CTA BRAIN: Patent intracranial circulation. No large vessel occlusion or significant stenosis. CTA NECK: Patent cervical vasculature. Possible severe stenosis at the origin of the right vertebral artery versus artifact from regional motion.    Impression:  R parieto-occipital lobe petechial hemorrhage and possible infarct with age indeterminate infarct in superior R cerebellum. Possible severe stenosis at the origin of the right vertebral artery versus artifact from regional motion. Mechanism:  Embolic stroke due to new onset Afib vs hypercoagulable state in the setting of covid-19 infection.     NEURO: neurologically without acute change, continue close monitoring for neurologic deterioration given cerebral edema with mass effect and brain compression, CTH as noted above with continued evolution of right cerebellar infarct again noted petechial hemorrhagic transformation, stable right parietal infarct with noted increased mass effect, subtle 0.3cm shift similar to prior study, close monitoring, serial CTH for any worsening neurological status, avoid hyponatremia, SBP< 160mmHg (goal 120-160mmHg) in setting of petechial hemorrhage, avoid hypotension and rapid fluctuations , LDL 49- continue home dose atorvastatin 40mg PO daily. Patient unable to tolerate MRI, does not need to be done as inpatient. . VEEG (2/3) shows generalized slowing without seizures.  Physical therapy/OT: [facility]     ANTITHROMBOTIC THERAPY:  ASA for now, pending clinical and radiological stability within the next 10-14 days from admission will determine timeline for initiation of anticoagulation given atrial fibrillation.     PULMONARY:  CT chest (02/03) Small right pleural effusion, monitor for hypoxia, prior mild hypoxia likely 2nd to atelectasis,  protecting airway, saturating well on room air. Pulmonary follow up appreciated.   Encourage mobility and incentive spirometry.     CARDIOVASCULAR:  TTE shows EF 75/80% Chordale HASMUKH. Mild mitral regurgitation. Mild right atrial enlargement, severe pulmonary pressures. Patient noted to have Atrial Fibrillation on telemonitor on 2/4/22. Dr Keys (cardio) consult appreciated                          SBP goal: 120-160mmhg     GASTROINTESTINAL:  SLP eval with recommendations for Puree diet and Mildly Thick Liquids, tolerating well      RENAL: BUN/Cr within limits,  CKD 3B maintain adequate hydration, will monitor urine output      Na Goal: Greater than 135     Amin: No      HEMATOLOGY: H/H with anemia but no acute change, Platelets 280,  D-Dimer (02/09/22): 1162. will continue to monitor. LE doppler (02/03): No evidence of deep venous thrombosis in either proximal lower extremity. Hematology follow up appreciated: MGUS- follows at [facility], elevated D Dimer- CTA pending along with LE duplex.      DVT ppx: Heparin s.c      ID: afebrile, no leukocytosis, UA neg , monitor for si/sx of infection     Other: Dr Sharpe (medicine) f/u appreciated. Plan/Goals of care discussed over the phone with pt's daughter per team prior.    DISPOSITION: [facility] as per PT/OT eval once bed is available, medical work up is completed       CORE MEASURES:        Admission NIHSS: 16     TPA: [] YES [x] NO      LDL/HDL:p     Depression Screen: 0     Statin Therapy: y      Dysphagia Screen: [x] PASS [] FAIL     Smoking [] YES x[] NO      Afib [] YES [x] NO     Stroke Education [x] YES [] NO    Obtain screening lower extremity venous ultrasound in patients who meet 1 or more of the following criteria as patient is high risk for DVT/PE on admission:   [] History of DVT/PE  [x]Hypercoagulable states (Factor V Leiden, Cancer, OCP, etc. )  [x]Prolonged immobility (hemiplegia/hemiparesis/post operative or any other extended immobilization)  [] Transferred from outside facility (Rehab or Long term care)  [] Age </= to 50

## 2022-02-10 NOTE — PROGRESS NOTE ADULT - SUBJECTIVE AND OBJECTIVE BOX
Patient is a 89y old  Female who presents with a chief complaint of concern for stroke (10 Feb 2022 09:56)    Remote visit. Chart reviewed. No new issues. Being followed by Neuro for CVA.     MEDICATIONS  (STANDING):  amLODIPine   Tablet 10 milliGRAM(s) Oral daily  aspirin  chewable 81 milliGRAM(s) Oral daily  atorvastatin 40 milliGRAM(s) Oral at bedtime  brimonidine 0.2% Ophthalmic Solution 1 Drop(s) Both EYES two times a day  dextrose 40% Gel 15 Gram(s) Oral once  dextrose 5%. 1000 milliLiter(s) (50 mL/Hr) IV Continuous <Continuous>  dextrose 5%. 1000 milliLiter(s) (100 mL/Hr) IV Continuous <Continuous>  dextrose 50% Injectable 25 Gram(s) IV Push once  dextrose 50% Injectable 12.5 Gram(s) IV Push once  dextrose 50% Injectable 25 Gram(s) IV Push once  glucagon  Injectable 1 milliGRAM(s) IntraMuscular once  heparin   Injectable 5000 Unit(s) SubCutaneous every 12 hours  insulin glargine Injectable (LANTUS) 5 Unit(s) SubCutaneous at bedtime  insulin lispro (ADMELOG) corrective regimen sliding scale   SubCutaneous Before meals and at bedtime  latanoprost 0.005% Ophthalmic Solution 1 Drop(s) Both EYES at bedtime  levothyroxine 88 MICROGram(s) Oral daily    MEDICATIONS  (PRN):  acetaminophen     Tablet .. 650 milliGRAM(s) Oral every 6 hours PRN Mild Pain (1 - 3), Moderate Pain (4 - 6)      ROS  Per chart review, no SOB, GI or  symptoms    Vital Signs Last 24 Hrs  T(C): 37.5 (10 Feb 2022 11:45), Max: 37.5 (09 Feb 2022 20:16)  T(F): 99.5 (10 Feb 2022 11:45), Max: 99.5 (09 Feb 2022 20:16)  HR: 72 (10 Feb 2022 11:45) (72 - 76)  BP: 131/73 (10 Feb 2022 11:45) (118/68 - 157/73)  BP(mean): --  RR: 18 (10 Feb 2022 11:45) (18 - 19)  SpO2: 93% (10 Feb 2022 11:45) (93% - 98%)    PE  Remote visit. Exam deferred                          12.7   7.24  )-----------( 188      ( 10 Feb 2022 06:25 )             40.7       02-10    138  |  102  |  17  ----------------------------<  127<H>  4.7   |  22  |  1.09    Ca    9.5      10 Feb 2022 06:25  Phos  3.4     02-09  Mg     1.8     02-09

## 2022-02-10 NOTE — PROGRESS NOTE ADULT - PROBLEM SELECTOR PLAN 4
Per hx  -Heme/onc f/u.

## 2022-02-10 NOTE — CHART NOTE - NSCHARTNOTESELECT_GEN_ALL_CORE
EEG Prelim
Event Note
VTE Prophylaxis assessment/Event Note
EEG Prelim
EEG Prelim
Event Note
Med rec

## 2022-02-10 NOTE — PROGRESS NOTE ADULT - REASON FOR ADMISSION
concern for stroke

## 2022-02-10 NOTE — PROGRESS NOTE ADULT - PROBLEM SELECTOR PLAN 2
Spo2 low 90s on admission, likely 2nd to atelectasis  -CXR grossly clear  -CT chest with small R effusion and bibasilar atelectasis, no clear COVID PNA   -Hypoxia resolved, breathing comfortably on RA  -Keep sats >90% with supplemental o2 PRN  -OOB to chair as tolerated, incentive spirometry encouraged  -D/c planning to rehab pending LE duplex results.
Spo2 low 90s on admission, likely 2nd to atelectasis  -CXR grossly clear  -CT chest with small R effusion and bibasilar atelectasis, no clear COVID PNA   -Hypoxia resolved, now tolerating RA with O2 sats 96%   -OOB to chair as tolerated, incentive spirometry encouraged  -D/c planning to rehab per primary team.
Spo2 low 90s on admission, likely 2nd to atelectasis  -CXR grossly clear  -CT chest with small R effusion and bibasilar atelectasis, no clear COVID PNA   -Hypoxia resolved, now tolerating RA with O2 sats 96%   -OOB to chair as tolerated, incentive spirometry encouraged  -D/c planning to rehab per primary team.
now on room air   oxygen supplementation as needed.
now on room air   oxygen supplementation as needed.
Spo2 low 90s on admission  -Currently 100% on 1.5LNC, 96% on RA  -Wean to RA as tolerated, keep sats >92%   -Mild hypoxia likely 2nd to atelectasis rather than COVID PNA  -CXR grossly clear  -F/u CT chest.
Spo2 low 90s on admission  -CXR grossly clear  -CT chest with small R effusion and bibasilar atelectasis, no clear COVID PNA   -Currently 100% on 4LNC (previously on 1.5LNC, no documented desaturations noted)  -Lowered to 3LNC with sats remaining 100%, continue to wean o2 as tolerated, keep sats >90%  -Mild hypoxia likely 2nd to atelectasis  -OOB to chair as tolerated, incentive spirometry encouraged.
Spo2 low 90s on admission, likely 2nd to atelectasis  -CXR grossly clear  -CT chest with small R effusion and bibasilar atelectasis, no clear COVID PNA   -Hypoxia resolved, now tolerating RA with O2 sats 96%   -OOB to chair as tolerated, incentive spirometry encouraged  -D/c planning to rehab per primary team.
now on room air   oxygen supplementation as needed.
2L NC  oxygen supplementation as needed.  wean as tolerated maintaining O2>92%
now on room air   oxygen supplementation as needed.
now on room air   oxygen supplementation as needed.

## 2022-02-10 NOTE — CHART NOTE - NSCHARTNOTEFT_GEN_A_CORE
PRELIMINARY EEG REVIEW    EEG reviewed to 15:00  Date: 02-03-22    - No  seizure seen.    This Preliminary report is based on fellow review. Final report pending Completion of study tomorrow morning and following attending review.    Reading Room: [phone]  On Call Service After Hours: [phone]    Manny Nichole MD PGY-5  Epilepsy Fellow
PRELIMINARY EEG REVIEW    EEG reviewed to 17:27  Date: 02-02-22    - No epileptiform pattern or seizure seen.    This Preliminary report is based on fellow review. Final report pending Completion of study tomorrow morning and following attending review.    Reading Room: [phone]  On Call Service After Hours: [phone]    Manny Nichole MD PGY-5  Epilepsy Fellow
Patient was evaluated by stroke team for acute ischemic stroke with hemorrhagic transformation. She was deemed not a tPA OR Endovascular team.   ct HEAD Showed the hemorrhagic infraction HI2, no mass effect   patient is not on antiplatlets of anticoagulants   ? right cerebellar ischemic stroke   recommends doing neuro checks q 2 hour , repeat CT head in 6 hours, avoid antiplatlets and anticoagulants   get coags and consult neurosurgery in case of swelling of the cerebellar stroke. Keep -150 mmhg. Please call NSCU if any neurologic deterioration.     Meg Miller NSCU attending
Pt daughter Feli states patient gets meds from [facility] pharmacy [facility]. Called a 24hr pharmacy and completed med rec.
Obtain screening lower extremity venous ultrasound in patients who meet 1 or more of the following criteria as patient is high risk for DVT/PE on admission:   [] History of DVT/PE  []Hypercoagulable states (Factor V Leiden, Cancer, OCP, etc. )  []Prolonged immobility (hemiplegia/hemiparesis/post operative or any other extended immobilization)  [] Transferred from outside facility (Rehab or Long term care)  [] Age </= to 50.
PRELIMINARY EEG REVIEW    EEG reviewed to 16:06  Date: 02-03-22    - No seizure seen.    This Preliminary report is based on fellow review. Final report pending Completion of study tomorrow morning and following attending review.    Reading Room: [phone]  On Call Service After Hours: [phone]    Manny Nichole MD PGY-5  Epilepsy Fellow
Patient scheduled to get CTA to rule out PE due to elevated d Dimer, patient IV access inadequate for CTA once she arrived to CT scan.  Advised nurse to obtain IV access or to contact IV nurse so that patient can get required imaging.  Patients vitals remain stable, not tachycardiac or hypoxic during this time. Will continue to monitor.

## 2022-02-10 NOTE — PROGRESS NOTE ADULT - PROBLEM SELECTOR PROBLEM 2
Hypoxia
2019 novel coronavirus disease (COVID-19)
2019 novel coronavirus disease (COVID-19)
Hypoxia
Hypoxia
2019 novel coronavirus disease (COVID-19)
Hypoxia
Hypoxia
2019 novel coronavirus disease (COVID-19)
Hypoxia
2019 novel coronavirus disease (COVID-19)
2019 novel coronavirus disease (COVID-19)

## 2022-02-10 NOTE — PROGRESS NOTE ADULT - PROBLEM SELECTOR PROBLEM 4
MGUS (monoclonal gammopathy of unknown significance)

## 2022-02-10 NOTE — PROGRESS NOTE ADULT - SUBJECTIVE AND OBJECTIVE BOX
Subjective: Patient seen and examined. No new events except as noted.   no chest pain, no sob     REVIEW OF SYSTEMS:    CONSTITUTIONAL: + weakness, fevers or chills  EYES/ENT: No visual changes;  No vertigo or throat pain   NECK: No pain or stiffness  RESPIRATORY: No cough, wheezing, hemoptysis; No shortness of breath  CARDIOVASCULAR: No chest pain or palpitations  GASTROINTESTINAL: No abdominal or epigastric pain. No nausea, vomiting, or hematemesis; No diarrhea or constipation. No melena or hematochezia.  GENITOURINARY: No dysuria, frequency or hematuria  NEUROLOGICAL: No numbness or weakness  SKIN: No itching, burning, rashes, or lesions   All other review of systems is negative unless indicated above.    MEDICATIONS:  MEDICATIONS  (STANDING):  amLODIPine   Tablet 10 milliGRAM(s) Oral daily  aspirin  chewable 81 milliGRAM(s) Oral daily  atorvastatin 40 milliGRAM(s) Oral at bedtime  brimonidine 0.2% Ophthalmic Solution 1 Drop(s) Both EYES two times a day  dextrose 40% Gel 15 Gram(s) Oral once  dextrose 5%. 1000 milliLiter(s) (50 mL/Hr) IV Continuous <Continuous>  dextrose 5%. 1000 milliLiter(s) (100 mL/Hr) IV Continuous <Continuous>  dextrose 50% Injectable 25 Gram(s) IV Push once  dextrose 50% Injectable 12.5 Gram(s) IV Push once  dextrose 50% Injectable 25 Gram(s) IV Push once  glucagon  Injectable 1 milliGRAM(s) IntraMuscular once  heparin   Injectable 5000 Unit(s) SubCutaneous every 12 hours  insulin glargine Injectable (LANTUS) 5 Unit(s) SubCutaneous at bedtime  insulin lispro (ADMELOG) corrective regimen sliding scale   SubCutaneous Before meals and at bedtime  latanoprost 0.005% Ophthalmic Solution 1 Drop(s) Both EYES at bedtime  levothyroxine 88 MICROGram(s) Oral daily      PHYSICAL EXAM:  T(C): 37.5 (02-10-22 @ 11:45), Max: 37.5 (02-09-22 @ 20:16)  HR: 72 (02-10-22 @ 11:45) (72 - 76)  BP: 131/73 (02-10-22 @ 11:45) (118/68 - 157/73)  RR: 18 (02-10-22 @ 11:45) (18 - 19)  SpO2: 93% (02-10-22 @ 11:45) (93% - 98%)  Wt(kg): --  I&O's Summary    09 Feb 2022 07:01  -  10 Feb 2022 07:00  --------------------------------------------------------  IN: 600 mL / OUT: 750 mL / NET: -150 mL      Appearance: NAD	  HEENT: Normal oral mucosa, PERRL, EOMI	  Lymphatic: No lymphadenopathy , no edema  Cardiovascular: Normal S1 S2, No JVD, No murmurs , Peripheral pulses palpable 2+ bilaterally  Respiratory: Lungs clear to auscultation, normal effort 	  Gastrointestinal:  Soft, Non-tender, + BS	  Skin: No rashes, No ecchymoses, No cyanosis, warm to touch  NEUROLOGICAL EXAM:  Mental status: Eyes open, awake, alert, oriented to name, age, intermittently location, generates some short phrases, follows most one step commands   Cranial Nerves: Mild left facial palsy, no nystagmus, no dysarthria,  tongue midline  Motor exam: Normal tone, no drift, RUE/RLE 4/5, left hemiparesis LUE/LLE 4/5.    Sensation: Intact to light touch   Coordination/ Gait: gait not assessed  Ext: No edema      LABS:    CARDIAC MARKERS:                          12.7   7.24  )-----------( 188      ( 10 Feb 2022 06:25 )             40.7     02-10    138  |  102  |  17  ----------------------------<  127<H>  4.7   |  22  |  1.09    Ca    9.5      10 Feb 2022 06:25  Phos  3.4     02-09  Mg     1.8     02-09      proBNP:   Lipid Profile:   HgA1c:   TSH:     TELEMETRY: Afib	    ECG:  	  RADIOLOGY:   DIAGNOSTIC TESTING:  [ ] Echocardiogram:  [ ]  Catheterization:  [ ] Stress Test:    OTHER:

## 2022-02-10 NOTE — PROGRESS NOTE ADULT - SUBJECTIVE AND OBJECTIVE BOX
DATE OF SERVICE: 02-10-22 @ 15:23    Patient is a 89y old  Female who presents with a chief complaint of concern for stroke (10 Feb 2022 14:18)      SUBJECTIVE / OVERNIGHT EVENTS:  No chest pain. No shortness of breath. No complaints. No events overnight.     MEDICATIONS  (STANDING):  amLODIPine   Tablet 10 milliGRAM(s) Oral daily  aspirin  chewable 81 milliGRAM(s) Oral daily  atorvastatin 40 milliGRAM(s) Oral at bedtime  brimonidine 0.2% Ophthalmic Solution 1 Drop(s) Both EYES two times a day  dextrose 40% Gel 15 Gram(s) Oral once  dextrose 5%. 1000 milliLiter(s) (50 mL/Hr) IV Continuous <Continuous>  dextrose 5%. 1000 milliLiter(s) (100 mL/Hr) IV Continuous <Continuous>  dextrose 50% Injectable 25 Gram(s) IV Push once  dextrose 50% Injectable 12.5 Gram(s) IV Push once  dextrose 50% Injectable 25 Gram(s) IV Push once  glucagon  Injectable 1 milliGRAM(s) IntraMuscular once  heparin   Injectable 5000 Unit(s) SubCutaneous every 12 hours  insulin glargine Injectable (LANTUS) 5 Unit(s) SubCutaneous at bedtime  insulin lispro (ADMELOG) corrective regimen sliding scale   SubCutaneous Before meals and at bedtime  latanoprost 0.005% Ophthalmic Solution 1 Drop(s) Both EYES at bedtime  levothyroxine 88 MICROGram(s) Oral daily    MEDICATIONS  (PRN):  acetaminophen     Tablet .. 650 milliGRAM(s) Oral every 6 hours PRN Mild Pain (1 - 3), Moderate Pain (4 - 6)      Vital Signs Last 24 Hrs  T(C): 37.5 (10 Feb 2022 11:45), Max: 37.5 (09 Feb 2022 20:16)  T(F): 99.5 (10 Feb 2022 11:45), Max: 99.5 (09 Feb 2022 20:16)  HR: 72 (10 Feb 2022 11:45) (72 - 76)  BP: 131/73 (10 Feb 2022 11:45) (118/68 - 157/73)  BP(mean): --  RR: 18 (10 Feb 2022 11:45) (18 - 19)  SpO2: 93% (10 Feb 2022 11:45) (93% - 98%)  CAPILLARY BLOOD GLUCOSE      POCT Blood Glucose.: 138 mg/dL (10 Feb 2022 12:11)  POCT Blood Glucose.: 124 mg/dL (10 Feb 2022 07:57)  POCT Blood Glucose.: 149 mg/dL (09 Feb 2022 22:36)  POCT Blood Glucose.: 204 mg/dL (09 Feb 2022 17:15)    I&O's Summary    09 Feb 2022 07:01  -  10 Feb 2022 07:00  --------------------------------------------------------  IN: 600 mL / OUT: 750 mL / NET: -150 mL    10 Feb 2022 07:01  -  10 Feb 2022 15:23  --------------------------------------------------------  IN: 240 mL / OUT: 400 mL / NET: -160 mL        PHYSICAL EXAM:  GENERAL: NAD, well-developed  HEAD:  Atraumatic, Normocephalic  EYES: EOMI, PERRLA, conjunctiva and sclera clear  NECK: Supple, No JVD  CHEST/LUNG: Clear to auscultation bilaterally; No wheeze  HEART: Regular rate and rhythm; No murmurs, rubs, or gallops  ABDOMEN: Soft, Nontender, Nondistended; Bowel sounds present  EXTREMITIES:  2+ Peripheral Pulses, No clubbing, cyanosis, or edema  PSYCH: AAOx1  NEUROLOGY: non-focal  SKIN: No rashes or lesions    LABS:                        12.7   7.24  )-----------( 188      ( 10 Feb 2022 06:25 )             40.7     02-10    138  |  102  |  17  ----------------------------<  127<H>  4.7   |  22  |  1.09    Ca    9.5      10 Feb 2022 06:25  Phos  3.4     02-09  Mg     1.8     02-09    D-Dimer Assay, Quantitative (02.09.22 @ 06:02)   D-Dimer Assay, Quantitative: 1162: “D-Dimer result less than or equal to 229ng/mL DDU correlates with the   absence of thrombosis in a patient with a low and moderate pre-test   probability of thrombosis. 1DDU is approximately equal to 2ng/mL FEU   (previous unit)” ng/mL DDU       Historical Values  D-Dimer Assay, Quantitative (02.09.22 @ 06:02)   D-Dimer Assay, Quantitative: 1162: “D-Dimer result less than or equal to 229ng/mL DDU correlates with the   absence of thrombosis in a patient with a low and moderate pre-test   probability of thrombosis. 1DDU is approximately equal to 2ng/mL FEU   (previous unit)” ng/mL DDU   D-Dimer Assay, Quantitative (02.04.22 @ 06:07)   D-Dimer Assay, Quantitative: 522: “D-Dimer result less than or equal to 229ng/mL DDU correlates with the   absence of thrombosis in a patient with a low and moderate pre-test   probability of thrombosis. 1DDU is approximately equal to 2ng/mL FEU   (previous unit)” ng/mL DDU   D-Dimer Assay, Quantitative (02.02.22 @ 10:54)   D-Dimer Assay, Quantitative: 728: “D-Dimer result less than or equal to 229ng/mL DDU correlates with the   absence of thrombosis in a patient with a low and moderate pre-test   probability of thrombosis. 1DDU is approximately equal to 2ng/mL FEU < from: CT Angio Chest PE Protocol w/ IV Cont (02.10.22 @ 09:49) >  Tubes/Lines: None.    Lungs, airways and pleura: In the left upper lobe are 3 mm opacities   (series 5 image 37, 42).    Bilateral lower lobe linear atelectasis. The remainder of the imaged   lungs are clear. The airways and pleura are normal.    Mediastinum: The visualized thyroid gland is normal. The chest lymph   nodes measure less than 10 mm the short axis. Hiatal hernia.    The right and left atria are enlarged. Coronary calcifications. No   pericardial effusion. Aortic valve leaflet thickening calcification.   Mitral annular calcification. Mitral valve leaflet thickening.    Left-sided aortic arch and left-sided descending thoracic aorta. Aorta is   normal in caliber. Atheromatous disease of the aorta.    Upper Abdomen: Cholecystectomy. Calcification of the lateral limb of the   right adrenal gland. The adrenal glands are thickened. Partially imaged   hypodense left renal lesion.    Bones And Soft Tissues: Degenerative change of the spine. Flowing   syndesmophytes along the thoracic spine with ankylosis anteriorly.   Ankylosis and fusion of the interspinous ligaments in the thoracic spine.    The soft tissues are unremarkable.      IMPRESSION:    1.  No pulmonary embolus.  2.  The 3 mm left upper lobe opacities are new since 2/3/2022. A   follow-up is recommended in 3 months to evaluate for resolution/change.    --- End of Report ---      < end of copied text >  < from: [facility] Duplex Lower Ext Vein Scan, Bilat (02.10.22 @ 12:53) >  COMPARISON: None available.    TECHNIQUE: Duplex sonography of the BILATERAL LOWER extremity veins with   color and spectral Doppler, with and without compression was performed at   the bedside with a dedicated Covid protocol.    FINDINGS:    RIGHT:  Normal compressibility of the visualized venous segments of the RIGHT   common femoral, femoral and popliteal veins.  Doppler examination shows normal spontaneous and phasic flow.  No RIGHT calf vein thrombosis is detected.    LEFT:  Normal compressibility of the visualized venoussegments of the LEFT   common femoral, femoral and popliteal veins.  Doppler examination shows normal spontaneous and phasic flow.  No LEFT calf vein thrombosis is detected.    IMPRESSION:  No evidence of deep venous thrombosis in either lower extremity in the   visualized venous segments.      < end of copied text >              RADIOLOGY & ADDITIONAL TESTS:    Imaging Personally Reviewed:    Consultant(s) Notes Reviewed:      Care Discussed with Consultants/Other Providers:

## 2022-02-10 NOTE — PROGRESS NOTE ADULT - PROBLEM SELECTOR PROBLEM 3
HTN (hypertension)
Cerebrovascular accident (CVA)
Cerebrovascular accident (CVA)
HTN (hypertension)
HTN (hypertension)
Cerebrovascular accident (CVA)
Cerebrovascular accident (CVA)
HTN (hypertension)
HTN (hypertension)
Cerebrovascular accident (CVA)
Cerebrovascular accident (CVA)
HTN (hypertension)

## 2022-02-10 NOTE — PROGRESS NOTE ADULT - SUBJECTIVE AND OBJECTIVE BOX
Follow-up Pulm Progress Note    No new respiratory events overnight.  Denies SOB/CP.     Medications:  MEDICATIONS  (STANDING):  amLODIPine   Tablet 10 milliGRAM(s) Oral daily  aspirin  chewable 81 milliGRAM(s) Oral daily  atorvastatin 40 milliGRAM(s) Oral at bedtime  brimonidine 0.2% Ophthalmic Solution 1 Drop(s) Both EYES two times a day  dextrose 40% Gel 15 Gram(s) Oral once  dextrose 5%. 1000 milliLiter(s) (50 mL/Hr) IV Continuous <Continuous>  dextrose 5%. 1000 milliLiter(s) (100 mL/Hr) IV Continuous <Continuous>  dextrose 50% Injectable 25 Gram(s) IV Push once  dextrose 50% Injectable 12.5 Gram(s) IV Push once  dextrose 50% Injectable 25 Gram(s) IV Push once  glucagon  Injectable 1 milliGRAM(s) IntraMuscular once  heparin   Injectable 5000 Unit(s) SubCutaneous every 12 hours  insulin glargine Injectable (LANTUS) 5 Unit(s) SubCutaneous at bedtime  insulin lispro (ADMELOG) corrective regimen sliding scale   SubCutaneous Before meals and at bedtime  latanoprost 0.005% Ophthalmic Solution 1 Drop(s) Both EYES at bedtime  levothyroxine 88 MICROGram(s) Oral daily    MEDICATIONS  (PRN):  acetaminophen     Tablet .. 650 milliGRAM(s) Oral every 6 hours PRN Mild Pain (1 - 3), Moderate Pain (4 - 6)          Vital Signs Last 24 Hrs  T(C): 37.5 (10 Feb 2022 11:45), Max: 37.5 (09 Feb 2022 20:16)  T(F): 99.5 (10 Feb 2022 11:45), Max: 99.5 (09 Feb 2022 20:16)  HR: 72 (10 Feb 2022 11:45) (72 - 76)  BP: 131/73 (10 Feb 2022 11:45) (118/68 - 157/73)  BP(mean): --  RR: 18 (10 Feb 2022 11:45) (18 - 19)  SpO2: 93% (10 Feb 2022 11:45) (93% - 98%)          02-09 @ 07:01  -  02-10 @ 07:00  --------------------------------------------------------  IN: 600 mL / OUT: 750 mL / NET: -150 mL          LABS:                        12.7   7.24  )-----------( 188      ( 10 Feb 2022 06:25 )             40.7     02-10    138  |  102  |  17  ----------------------------<  127<H>  4.7   |  22  |  1.09    Ca    9.5      10 Feb 2022 06:25  Phos  3.4     02-09  Mg     1.8     02-09            CAPILLARY BLOOD GLUCOSE      POCT Blood Glucose.: 138 mg/dL (10 Feb 2022 12:11)                        CULTURES:    Culture - Blood (collected 02-02-22 @ 19:26)  Source: .Blood Blood-Peripheral  Final Report (02-07-22 @ 20:00):    No Growth Final    Culture - Blood (collected 02-02-22 @ 14:51)  Source: .Blood Blood-Peripheral  Final Report (02-07-22 @ 15:00):    No Growth Final        Culture - Urine (collected 02-02-22 @ 03:59)  Source: Catheterized Catheterized  Final Report (02-02-22 @ 23:25):    No growth                Physical Examination:  PULM: Clear to auscultation bilaterally, no significant sputum production  CVS: S1, S2 heard      RADIOLOGY REVIEWED    CT chest:  < from: CT Angio Chest PE Protocol w/ IV Cont (02.10.22 @ 09:49) >  FINDINGS:    Pulmonary Artery:  There is no main, central, lobar, segmental, or   subsegmental pulmonary embolus.    Tubes/Lines: None.    Lungs, airways and pleura: In the left upper lobe are 3 mm opacities   (series 5 image 37, 42).    Bilateral lower lobe linear atelectasis. The remainder of the imaged   lungs are clear. The airways and pleura are normal.    Mediastinum: The visualized thyroid gland is normal. The chest lymph   nodes measure less than 10 mm the short axis. Hiatal hernia.    The right and left atria are enlarged. Coronary calcifications. No   pericardial effusion. Aortic valve leaflet thickening calcification.   Mitral annular calcification. Mitral valve leaflet thickening.    Left-sided aortic arch and left-sided descending thoracic aorta. Aorta is   normal in caliber. Atheromatous disease of the aorta.    Upper Abdomen: Cholecystectomy. Calcification of the lateral limb of the   right adrenal gland. The adrenal glands are thickened. Partially imaged   hypodense left renal lesion.    Bones And Soft Tissues: Degenerative change of the spine. Flowing   syndesmophytes along the thoracic spine with ankylosis anteriorly.   Ankylosis and fusion of the interspinous ligaments in the thoracic spine.    The soft tissues are unremarkable.      IMPRESSION:    1.  No pulmonary embolus.  2.  The 3 mm left upper lobe opacities are new since 2/3/2022. A   follow-up is recommended in 3 months to evaluate for resolution/change.      < end of copied text >

## 2022-02-10 NOTE — PROGRESS NOTE ADULT - PROBLEM SELECTOR PROBLEM 1
Cerebrovascular accident (CVA)
2019 novel coronavirus disease (COVID-19)
Cerebrovascular accident (CVA)
2019 novel coronavirus disease (COVID-19)
Cerebrovascular accident (CVA)
2019 novel coronavirus disease (COVID-19)
Cerebrovascular accident (CVA)
2019 novel coronavirus disease (COVID-19)
Cerebrovascular accident (CVA)
Cerebrovascular accident (CVA)

## 2022-02-10 NOTE — PROGRESS NOTE ADULT - PROBLEM SELECTOR PLAN 1
COVID PCR + 2/2  -CXR grossly clear  -No recent COVID infection per family, vaccinated x2 doses, did not receive booster   -Reportedly with hypoxia to low 90s on admission, hypoxia resolved   -Would monitor off treatment, mild hypoxia likely 2nd to atelectasis rather than COVID PNA   -Trend inflammatory markers  -DVT ppx  -LE duplex negative for DVT  -CT chest with no clear PNA  -DDimer elevated 2/9. CTA chest neg PE, f/u repeat LE duplex.

## 2022-03-25 ENCOUNTER — APPOINTMENT (OUTPATIENT)
Dept: CT IMAGING | Facility: IMAGING CENTER | Age: 87
End: 2022-03-25
Payer: MEDICARE

## 2022-03-25 ENCOUNTER — OUTPATIENT (OUTPATIENT)
Dept: OUTPATIENT SERVICES | Facility: HOSPITAL | Age: 87
LOS: 1 days | End: 2022-03-25
Payer: MEDICARE

## 2022-03-25 DIAGNOSIS — I63.9 CEREBRAL INFARCTION, UNSPECIFIED: ICD-10-CM

## 2022-03-25 DIAGNOSIS — Z90.49 ACQUIRED ABSENCE OF OTHER SPECIFIED PARTS OF DIGESTIVE TRACT: Chronic | ICD-10-CM

## 2022-03-25 PROCEDURE — 70450 CT HEAD/BRAIN W/O DYE: CPT | Mod: 26,MH

## 2022-03-25 PROCEDURE — 70450 CT HEAD/BRAIN W/O DYE: CPT | Mod: MH

## 2022-05-16 ENCOUNTER — INPATIENT (INPATIENT)
Facility: HOSPITAL | Age: 87
LOS: 9 days | Discharge: NURSING, REHAB OR LONG-TERM CARE | DRG: 177 | End: 2022-05-26
Attending: GENERAL ACUTE CARE HOSPITAL | Admitting: HOSPITALIST
Payer: MEDICARE

## 2022-05-16 VITALS — HEIGHT: 60 IN

## 2022-05-16 DIAGNOSIS — I50.9 HEART FAILURE, UNSPECIFIED: ICD-10-CM

## 2022-05-16 DIAGNOSIS — Z90.49 ACQUIRED ABSENCE OF OTHER SPECIFIED PARTS OF DIGESTIVE TRACT: Chronic | ICD-10-CM

## 2022-05-16 LAB
ALBUMIN SERPL ELPH-MCNC: 3.6 G/DL — SIGNIFICANT CHANGE UP (ref 3.3–5)
ALP SERPL-CCNC: 275 U/L — HIGH (ref 40–120)
ALT FLD-CCNC: 28 U/L — SIGNIFICANT CHANGE UP (ref 10–45)
ANION GAP SERPL CALC-SCNC: 12 MMOL/L — SIGNIFICANT CHANGE UP (ref 5–17)
APTT BLD: 33 SEC — SIGNIFICANT CHANGE UP (ref 27.5–35.5)
AST SERPL-CCNC: 22 U/L — SIGNIFICANT CHANGE UP (ref 10–40)
BASE EXCESS BLDV CALC-SCNC: -1.7 MMOL/L — SIGNIFICANT CHANGE UP (ref -2–2)
BASE EXCESS BLDV CALC-SCNC: -2.4 MMOL/L — LOW (ref -2–2)
BASE EXCESS BLDV CALC-SCNC: -2.9 MMOL/L — LOW (ref -2–2)
BASOPHILS # BLD AUTO: 0.04 K/UL — SIGNIFICANT CHANGE UP (ref 0–0.2)
BASOPHILS NFR BLD AUTO: 0.3 % — SIGNIFICANT CHANGE UP (ref 0–2)
BILIRUB SERPL-MCNC: 0.5 MG/DL — SIGNIFICANT CHANGE UP (ref 0.2–1.2)
BUN SERPL-MCNC: 22 MG/DL — SIGNIFICANT CHANGE UP (ref 7–23)
CA-I SERPL-SCNC: 1.26 MMOL/L — SIGNIFICANT CHANGE UP (ref 1.15–1.33)
CA-I SERPL-SCNC: 1.29 MMOL/L — SIGNIFICANT CHANGE UP (ref 1.15–1.33)
CA-I SERPL-SCNC: 1.32 MMOL/L — SIGNIFICANT CHANGE UP (ref 1.15–1.33)
CALCIUM SERPL-MCNC: 9.5 MG/DL — SIGNIFICANT CHANGE UP (ref 8.4–10.5)
CHLORIDE BLDV-SCNC: 106 MMOL/L — SIGNIFICANT CHANGE UP (ref 96–108)
CHLORIDE BLDV-SCNC: 106 MMOL/L — SIGNIFICANT CHANGE UP (ref 96–108)
CHLORIDE BLDV-SCNC: 107 MMOL/L — SIGNIFICANT CHANGE UP (ref 96–108)
CHLORIDE SERPL-SCNC: 105 MMOL/L — SIGNIFICANT CHANGE UP (ref 96–108)
CO2 BLDV-SCNC: 27 MMOL/L — HIGH (ref 22–26)
CO2 BLDV-SCNC: 27 MMOL/L — HIGH (ref 22–26)
CO2 BLDV-SCNC: 28 MMOL/L — HIGH (ref 22–26)
CO2 SERPL-SCNC: 21 MMOL/L — LOW (ref 22–31)
CREAT SERPL-MCNC: 1.3 MG/DL — SIGNIFICANT CHANGE UP (ref 0.5–1.3)
EGFR: 38 ML/MIN/1.73M2 — LOW
EOSINOPHIL # BLD AUTO: 0.05 K/UL — SIGNIFICANT CHANGE UP (ref 0–0.5)
EOSINOPHIL NFR BLD AUTO: 0.4 % — SIGNIFICANT CHANGE UP (ref 0–6)
GAS PNL BLDV: 136 MMOL/L — SIGNIFICANT CHANGE UP (ref 136–145)
GAS PNL BLDV: 139 MMOL/L — SIGNIFICANT CHANGE UP (ref 136–145)
GAS PNL BLDV: 140 MMOL/L — SIGNIFICANT CHANGE UP (ref 136–145)
GAS PNL BLDV: SIGNIFICANT CHANGE UP
GLUCOSE BLDV-MCNC: 178 MG/DL — HIGH (ref 70–99)
GLUCOSE BLDV-MCNC: 198 MG/DL — HIGH (ref 70–99)
GLUCOSE BLDV-MCNC: 220 MG/DL — HIGH (ref 70–99)
GLUCOSE SERPL-MCNC: 221 MG/DL — HIGH (ref 70–99)
HCO3 BLDV-SCNC: 25 MMOL/L — SIGNIFICANT CHANGE UP (ref 22–29)
HCO3 BLDV-SCNC: 26 MMOL/L — SIGNIFICANT CHANGE UP (ref 22–29)
HCO3 BLDV-SCNC: 26 MMOL/L — SIGNIFICANT CHANGE UP (ref 22–29)
HCT VFR BLD CALC: 28.5 % — LOW (ref 34.5–45)
HCT VFR BLDA CALC: 25 % — LOW (ref 34.5–46.5)
HCT VFR BLDA CALC: 26 % — LOW (ref 34.5–46.5)
HCT VFR BLDA CALC: 27 % — LOW (ref 34.5–46.5)
HGB BLD CALC-MCNC: 8.2 G/DL — LOW (ref 11.7–16.1)
HGB BLD CALC-MCNC: 8.5 G/DL — LOW (ref 11.7–16.1)
HGB BLD CALC-MCNC: 9 G/DL — LOW (ref 11.7–16.1)
HGB BLD-MCNC: 8.5 G/DL — LOW (ref 11.5–15.5)
IMM GRANULOCYTES NFR BLD AUTO: 1 % — SIGNIFICANT CHANGE UP (ref 0–1.5)
INR BLD: 1.36 RATIO — HIGH (ref 0.88–1.16)
LACTATE BLDV-MCNC: 1.9 MMOL/L — SIGNIFICANT CHANGE UP (ref 0.7–2)
LACTATE BLDV-MCNC: 2.1 MMOL/L — HIGH (ref 0.7–2)
LACTATE BLDV-MCNC: 2.4 MMOL/L — HIGH (ref 0.7–2)
LYMPHOCYTES # BLD AUTO: 1.43 K/UL — SIGNIFICANT CHANGE UP (ref 1–3.3)
LYMPHOCYTES # BLD AUTO: 11.4 % — LOW (ref 13–44)
MCHC RBC-ENTMCNC: 26 PG — LOW (ref 27–34)
MCHC RBC-ENTMCNC: 29.8 GM/DL — LOW (ref 32–36)
MCV RBC AUTO: 87.2 FL — SIGNIFICANT CHANGE UP (ref 80–100)
MONOCYTES # BLD AUTO: 0.85 K/UL — SIGNIFICANT CHANGE UP (ref 0–0.9)
MONOCYTES NFR BLD AUTO: 6.8 % — SIGNIFICANT CHANGE UP (ref 2–14)
NEUTROPHILS # BLD AUTO: 10.01 K/UL — HIGH (ref 1.8–7.4)
NEUTROPHILS NFR BLD AUTO: 80.1 % — HIGH (ref 43–77)
NRBC # BLD: 0 /100 WBCS — SIGNIFICANT CHANGE UP (ref 0–0)
NT-PROBNP SERPL-SCNC: 5061 PG/ML — HIGH (ref 0–300)
PCO2 BLDV: 57 MMHG — HIGH (ref 39–42)
PCO2 BLDV: 63 MMHG — HIGH (ref 39–42)
PCO2 BLDV: 67 MMHG — HIGH (ref 39–42)
PH BLDV: 7.2 — LOW (ref 7.32–7.43)
PH BLDV: 7.21 — LOW (ref 7.32–7.43)
PH BLDV: 7.26 — LOW (ref 7.32–7.43)
PLATELET # BLD AUTO: 447 K/UL — HIGH (ref 150–400)
PO2 BLDV: 28 MMHG — SIGNIFICANT CHANGE UP (ref 25–45)
PO2 BLDV: 39 MMHG — SIGNIFICANT CHANGE UP (ref 25–45)
PO2 BLDV: 40 MMHG — SIGNIFICANT CHANGE UP (ref 25–45)
POTASSIUM BLDV-SCNC: 4.3 MMOL/L — SIGNIFICANT CHANGE UP (ref 3.5–5.1)
POTASSIUM BLDV-SCNC: 4.6 MMOL/L — SIGNIFICANT CHANGE UP (ref 3.5–5.1)
POTASSIUM BLDV-SCNC: 6.4 MMOL/L — CRITICAL HIGH (ref 3.5–5.1)
POTASSIUM SERPL-MCNC: 4.5 MMOL/L — SIGNIFICANT CHANGE UP (ref 3.5–5.3)
POTASSIUM SERPL-SCNC: 4.5 MMOL/L — SIGNIFICANT CHANGE UP (ref 3.5–5.3)
PROT SERPL-MCNC: 8.3 G/DL — SIGNIFICANT CHANGE UP (ref 6–8.3)
PROTHROM AB SERPL-ACNC: 15.7 SEC — HIGH (ref 10.5–13.4)
RBC # BLD: 3.27 M/UL — LOW (ref 3.8–5.2)
RBC # FLD: 15.9 % — HIGH (ref 10.3–14.5)
SAO2 % BLDV: 37.8 % — LOW (ref 67–88)
SAO2 % BLDV: 60.9 % — LOW (ref 67–88)
SAO2 % BLDV: 65.1 % — LOW (ref 67–88)
SARS-COV-2 RNA SPEC QL NAA+PROBE: DETECTED
SODIUM SERPL-SCNC: 138 MMOL/L — SIGNIFICANT CHANGE UP (ref 135–145)
TROPONIN T, HIGH SENSITIVITY RESULT: 54 NG/L — HIGH (ref 0–51)
TROPONIN T, HIGH SENSITIVITY RESULT: 60 NG/L — HIGH (ref 0–51)
WBC # BLD: 12.5 K/UL — HIGH (ref 3.8–10.5)
WBC # FLD AUTO: 12.5 K/UL — HIGH (ref 3.8–10.5)

## 2022-05-16 PROCEDURE — 93010 ELECTROCARDIOGRAM REPORT: CPT

## 2022-05-16 PROCEDURE — 71045 X-RAY EXAM CHEST 1 VIEW: CPT | Mod: 26

## 2022-05-16 PROCEDURE — 99285 EMERGENCY DEPT VISIT HI MDM: CPT | Mod: CS

## 2022-05-16 PROCEDURE — 71275 CT ANGIOGRAPHY CHEST: CPT | Mod: 26,MA

## 2022-05-16 PROCEDURE — 71045 X-RAY EXAM CHEST 1 VIEW: CPT | Mod: 26,77

## 2022-05-16 RX ORDER — DEXAMETHASONE 0.5 MG/5ML
6 ELIXIR ORAL ONCE
Refills: 0 | Status: COMPLETED | OUTPATIENT
Start: 2022-05-16 | End: 2022-05-16

## 2022-05-16 RX ORDER — PIPERACILLIN AND TAZOBACTAM 4; .5 G/20ML; G/20ML
3.38 INJECTION, POWDER, LYOPHILIZED, FOR SOLUTION INTRAVENOUS ONCE
Refills: 0 | Status: COMPLETED | OUTPATIENT
Start: 2022-05-16 | End: 2022-05-16

## 2022-05-16 RX ORDER — HALOPERIDOL DECANOATE 100 MG/ML
5 INJECTION INTRAMUSCULAR ONCE
Refills: 0 | Status: DISCONTINUED | OUTPATIENT
Start: 2022-05-16 | End: 2022-05-16

## 2022-05-16 RX ORDER — HALOPERIDOL DECANOATE 100 MG/ML
5 INJECTION INTRAMUSCULAR ONCE
Refills: 0 | Status: COMPLETED | OUTPATIENT
Start: 2022-05-16 | End: 2022-05-16

## 2022-05-16 RX ORDER — FUROSEMIDE 40 MG
20 TABLET ORAL ONCE
Refills: 0 | Status: COMPLETED | OUTPATIENT
Start: 2022-05-16 | End: 2022-05-16

## 2022-05-16 RX ADMIN — Medication 6 MILLIGRAM(S): at 14:30

## 2022-05-16 RX ADMIN — HALOPERIDOL DECANOATE 5 MILLIGRAM(S): 100 INJECTION INTRAMUSCULAR at 19:20

## 2022-05-16 RX ADMIN — Medication 20 MILLIGRAM(S): at 16:21

## 2022-05-16 RX ADMIN — PIPERACILLIN AND TAZOBACTAM 200 GRAM(S): 4; .5 INJECTION, POWDER, LYOPHILIZED, FOR SOLUTION INTRAVENOUS at 20:12

## 2022-05-16 NOTE — ED PROVIDER NOTE - CLINICAL SUMMARY MEDICAL DECISION MAKING FREE TEXT BOX
89 yo F 89 yo F above pmhx covid + hypoxic tachypneic. put on bipap.. c/f covid hypoxia vs chf vs acs vs pna vs sepsis. will get labs ekg trope bnp cxr cta cx vbg. will give streroids and r/a

## 2022-05-16 NOTE — ED ADULT NURSE NOTE - OBJECTIVE STATEMENT
89 y/o Female presenting to the ED by EMS from the [facility], A&Ox1 oriented to person with dementia at baseline, sent for SOB. Pt found to be sating in the 60's, arriving 96% on 15L of O2 on NRB. Pt covid+ as of this morning. Pt arrives tachypneic to the 40's, MD at bedside immediately. Accessory muscle use noted. Respiratory called for BiPAP. BiPAP placed and pt now breathing in the 20's, sating 100% and no accessory muscle use. Arrives with 18 in the R FA and second IV placed upon arrival. Pt cleaned and found to have a bowel movement. Safety and comfort measures provided, bed locked and in lowest position, side rails up for safety. Isolation precautions in place.

## 2022-05-16 NOTE — ED PROVIDER NOTE - ATTENDING CONTRIBUTION TO CARE
I, Barb Rosales, performed a history and physical exam of the patient and discussed their management with the resident and /or advanced care provider. I reviewed the resident and /or ACP's note and agree with the documented findings and plan of care except where otherwise noted in my note. I was present and available for all procedures.     89 yo F pmh htn. hld, TIA, afib presents from nursing facility for hypoxic respiratory failure in setting of COVID 19, 60% 02 on RA, imrpved to 97% on 15 L NRB. Found to be covid + at facility. Patient poor historian, however noted to have increased work of breathing, tachypnea, even on 15L NRB. Bedside POCUS with R pleural effusion, scattered B lines (2/2 covid vs new chf), no septal D sign or gross evidence of rv enlargement to suggest PE although windows limited. BiPAP called for immediately, placed on 10/5 with improvement in clinical status. No LE edema or discoloration. Echo Feb 2022 with EF 75-80%. Concern for hypoxic resp failure 2/2 covid vs new chf, consider ACS, less likely PE. Labs, xray, decadron, bipap, CTA, to be admitted. Will hold off on empiric abx for now given no fever and known viral infection unless superimposed bacterial pna found. I, Barb Rosales, performed a history and physical exam of the patient and discussed their management with the resident and /or advanced care provider. I reviewed the resident and /or ACP's note and agree with the documented findings and plan of care except where otherwise noted in my note. I was present and available for all procedures.     89 yo F pmh htn. hld, TIA, afib presents from nursing facility for hypoxic respiratory failure in setting of COVID 19, 60% 02 on RA, imrpved to 97% on 15 L NRB. Found to be covid + at facility. Patient poor historian, however noted to have increased work of breathing, tachypnea, even on 15L NRB. Bedside POCUS with R pleural effusion, scattered B lines (2/2 covid vs new chf), no septal D sign or gross evidence of rv enlargement to suggest PE although windows limited. BiPAP called for immediately, placed on 10/5 with improvement in clinical status. No LE edema or discoloration. Echo Feb 2022 with EF 75-80%. Concern for hypoxic resp failure 2/2 covid vs new chf, consider ACS, less likely PE. Labs, xray, decadron, bipap, CTA, to be admitted. Will hold off on empiric abx for now given no fever and known viral infection unless superimposed bacterial pna found.    I have spent at least 30 minutes of critical care on the evaluation and treatment of this patient

## 2022-05-16 NOTE — ED PROVIDER NOTE - OTHER FINDINGS
13yo male with PMHx of right humerus bone cyst, PSH of curettage of bone cyst. CBC, BMP and mixing studies sent today as indicated No evidence of acute illness or infection. Child life prep with family. CHG wipes given and detailed instructions given. qtc 471 15yo male with PMHx of right humerus bone cyst, PSH of curettage of bone cyst. CBC, BMP and mixing studies sent today as indicated No evidence of acute illness or infection. Child life prep with family. CHG wipes given and detailed instructions given.  Patient very anxious, discussed with him and Mother option of pre-sedation, in agreement he would benefit from. Order placed on hold for DOS.

## 2022-05-16 NOTE — ED PROVIDER NOTE - OBJECTIVE STATEMENT
89 yo F HTN, HLD, TIA, DM, hypothyroid p/w SOB. BIBEMS covid + found to be hypoxic to high 60s on RA put on O2 satting 80s. 15L satting low 90s tachypnic with increased WOB. pt unable to provide hx. Put on bibpap. POCUS diffuse b lines throughout L pleural effusion.

## 2022-05-16 NOTE — ED PROVIDER NOTE - PROGRESS NOTE DETAILS
Carmelo HOLDEN PGY1: spoke to pt daughter Feli Payton. confrimed DNR DNI status. They would like pressors and chest tube if needed. Patient initially clinically improving with improved resp acidosis on vbg, now seems a bit more lethargic, patient repositioned, still responsive to verbal stimuli, rpt vbg pending. signed out to dr clinton

## 2022-05-16 NOTE — ED PROVIDER NOTE - CARE PLAN
Principal Discharge DX:	Acute respiratory failure due to COVID-19   1 Principal Discharge DX:	New onset of congestive heart failure  Secondary Diagnosis:	Acute respiratory failure with hypoxia

## 2022-05-16 NOTE — CONSULT NOTE ADULT - SUBJECTIVE AND OBJECTIVE BOX
CHIEF COMPLAINT:    HPI:    89 yo F HTN, HLD, TIA, DM, hypothyroid p/w SOB. BIBEMS covid + found to be hypoxic to high 60s on RA put on O2 satting 80s. 15L satting low 90s tachypnic with increased WOB. pt unable to provide hx.    MICU consulted for new bilevel ventilation in the setting of COVID +. Patient on interview lethargic awakening to verbal and physical stimuli, tolerating the bipap mask pulling in good tidal volumes and saturating 100%.     PAST MEDICAL & SURGICAL HISTORY:  HTN (hypertension)      HLD (hyperlipidemia)      DM (diabetes mellitus)      Hypothyroidism      TIA (transient ischemic attack)      S/P cholecystectomy          FAMILY HISTORY:      Allergies    No Known Allergies    Intolerances        HOME MEDICATIONS:    REVIEW OF SYSTEMS:  Unable to assess ROS as patient lethargic on interview.     OBJECTIVE:  ICU Vital Signs Last 24 Hrs  T(C): 36.2 (16 May 2022 14:45), Max: 36.8 (16 May 2022 14:20)  T(F): 97.2 (16 May 2022 14:45), Max: 98.3 (16 May 2022 14:20)  HR: 85 (16 May 2022 16:30) (80 - 93)  BP: 151/87 (16 May 2022 16:30) (113/72 - 151/87)  BP(mean): --  ABP: --  ABP(mean): --  RR: 22 (16 May 2022 16:30) (20 - 22)  SpO2: 100% (16 May 2022 16:30) (100% - 100%)        CAPILLARY BLOOD GLUCOSE          PHYSICAL EXAM:  General:   HEENT:   Lymph Nodes:  Neck:   Respiratory:   Cardiovascular:   Abdomen:   Extremities:   Skin:   Neurological:  Psychiatry:    HOSPITAL MEDICATIONS:  MEDICATIONS  (STANDING):    MEDICATIONS  (PRN):      LABS:                        8.5    12.50 )-----------( 447      ( 16 May 2022 14:26 )             28.5     05-16    138  |  105  |  22  ----------------------------<  221<H>  4.5   |  21<L>  |  1.30    Ca    9.5      16 May 2022 14:26    TPro  8.3  /  Alb  3.6  /  TBili  0.5  /  DBili  x   /  AST  22  /  ALT  28  /  AlkPhos  275<H>  05-16    PT/INR - ( 16 May 2022 14:26 )   PT: 15.7 sec;   INR: 1.36 ratio         PTT - ( 16 May 2022 14:26 )  PTT:33.0 sec      Venous Blood Gas:  05-16 @ 16:38  7.21/63/39/25/60.9  VBG Lactate: 1.9  Venous Blood Gas:  05-16 @ 15:01  7.26/57/40/26/65.1  VBG Lactate: 2.1  Venous Blood Gas:  05-16 @ 13:37  7.20/67/28/26/37.8  VBG Lactate: 2.4      MICROBIOLOGY:     RADIOLOGY:  [ ] Reviewed and interpreted by me    EKG: CHIEF COMPLAINT:    HPI:    89 yo F HTN, HLD, TIA, DM, hypothyroid p/w SOB. BIBEMS covid + found to be hypoxic to high 60s on RA put on O2 satting 80s. 15L satting low 90s tachypnic with increased WOB. pt unable to provide hx.    MICU consulted for new bilevel ventilation in the setting of COVID +. Patient on interview lethargic awakening to verbal and physical stimuli, tolerating the bipap mask pulling in good tidal volumes and saturating 100%. Not able to give much verbal given on bilevel mask.     PAST MEDICAL & SURGICAL HISTORY:  HTN (hypertension)      HLD (hyperlipidemia)      DM (diabetes mellitus)      Hypothyroidism      TIA (transient ischemic attack)      S/P cholecystectomy          FAMILY HISTORY:      Allergies    No Known Allergies    Intolerances        HOME MEDICATIONS:    REVIEW OF SYSTEMS:  Unable to assess ROS as patient lethargic on interview.     OBJECTIVE:  ICU Vital Signs Last 24 Hrs  T(C): 36.2 (16 May 2022 14:45), Max: 36.8 (16 May 2022 14:20)  T(F): 97.2 (16 May 2022 14:45), Max: 98.3 (16 May 2022 14:20)  HR: 85 (16 May 2022 16:30) (80 - 93)  BP: 151/87 (16 May 2022 16:30) (113/72 - 151/87)  BP(mean): --  ABP: --  ABP(mean): --  RR: 22 (16 May 2022 16:30) (20 - 22)  SpO2: 100% (16 May 2022 16:30) (100% - 100%)        CAPILLARY BLOOD GLUCOSE          PHYSICAL EXAM:  General:   HEENT:   Lymph Nodes:  Neck:   Respiratory:   Cardiovascular:   Abdomen:   Extremities:   Skin:   Neurological:  Psychiatry:    HOSPITAL MEDICATIONS:  MEDICATIONS  (STANDING):    MEDICATIONS  (PRN):      LABS:                        8.5    12.50 )-----------( 447      ( 16 May 2022 14:26 )             28.5     05-16    138  |  105  |  22  ----------------------------<  221<H>  4.5   |  21<L>  |  1.30    Ca    9.5      16 May 2022 14:26    TPro  8.3  /  Alb  3.6  /  TBili  0.5  /  DBili  x   /  AST  22  /  ALT  28  /  AlkPhos  275<H>  05-16    PT/INR - ( 16 May 2022 14:26 )   PT: 15.7 sec;   INR: 1.36 ratio         PTT - ( 16 May 2022 14:26 )  PTT:33.0 sec      Venous Blood Gas:  05-16 @ 16:38  7.21/63/39/25/60.9  VBG Lactate: 1.9  Venous Blood Gas:  05-16 @ 15:01  7.26/57/40/26/65.1  VBG Lactate: 2.1  Venous Blood Gas:  05-16 @ 13:37  7.20/67/28/26/37.8  VBG Lactate: 2.4      MICROBIOLOGY:     RADIOLOGY:  [ ] Reviewed and interpreted by me    EKG:

## 2022-05-16 NOTE — CONSULT NOTE ADULT - ASSESSMENT
***Note Incomplete until attending attestation**** 89 yo F HTN, HLD, TIA, DM, hypothyroid p/w SOB. BIBEMS covid + found to be hypoxic to high 60s on RA now on bilevel ventilation, MICU consulted for BIPAP settings.     #Hypercapnia:  -Agree with bilevel, pulling good TVs on 12/5 FiO2% 100%  -Titrate off bilevel as tolerated per ABGs  -Consider pulm eval in the am for R pleural effusion  -COVID care per primary team  -Consider GOC conversation to elucidate code status.     Currently not a candidate for MICU admission please reconsult PRN.

## 2022-05-16 NOTE — ED ADULT NURSE NOTE - ED STAT RN HANDOFF DETAILS
Report given to Jessica BABIN and Kisha BABIN. Aware of plan of care. No further questions at this time.

## 2022-05-16 NOTE — CONSULT NOTE ADULT - ATTENDING COMMENTS
89F Hx HTN, DM, SARS-CoV PNA 2/2022 p/w ED in Hypoxic Respiratory Distress on BiPAP Support with SOB, Lethargy responsive to Verbal and Physical Stimuli but unable to provide history found Covid PCR Positive again.  - Minimal BiPAP Setting with adequate Spontaneous TV SpO2 100%   - No immediate Respiratory/ Airway Danger   - F/U with [facility]   - DNR Status     Patient seen and examined with ICU Resident/Fellow at bedside after lab data, medical records and radiology reports reviewed. I have read and agreeable in general with resident's Documented Note, Assessment and Management Plan which reflected my opinions from bedside round and discussion.

## 2022-05-16 NOTE — ED ADULT NURSE REASSESSMENT NOTE - NS ED NURSE REASSESS COMMENT FT1
MD at bedside, pt actively removing BIPAP mask. Pt confused, pulling off BIPAP mask, O2 sat dropping to 70s. RN at bedside, able to reattach BIPAP mask . Pt not redirectable requiring haldol IM, charge notified pt needs constant obs for safety. RN notified war room to notify RN if O2 sat drops.

## 2022-05-16 NOTE — ED ADULT NURSE NOTE - INTERVENTIONS DEFINITIONS
[place] to call system/Call bell, personal items and telephone within reach/Physically safe environment: no spills, clutter or unnecessary equipment/Stretcher in lowest position, wheels locked, appropriate side rails in place/Provide visual cue, wrist band, yellow gown, etc.

## 2022-05-17 DIAGNOSIS — I48.20 CHRONIC ATRIAL FIBRILLATION, UNSPECIFIED: ICD-10-CM

## 2022-05-17 DIAGNOSIS — J96.01 ACUTE RESPIRATORY FAILURE WITH HYPOXIA: ICD-10-CM

## 2022-05-17 DIAGNOSIS — I10 ESSENTIAL (PRIMARY) HYPERTENSION: ICD-10-CM

## 2022-05-17 DIAGNOSIS — E11.9 TYPE 2 DIABETES MELLITUS WITHOUT COMPLICATIONS: ICD-10-CM

## 2022-05-17 DIAGNOSIS — Z29.9 ENCOUNTER FOR PROPHYLACTIC MEASURES, UNSPECIFIED: ICD-10-CM

## 2022-05-17 DIAGNOSIS — E03.9 HYPOTHYROIDISM, UNSPECIFIED: ICD-10-CM

## 2022-05-17 LAB
ALBUMIN SERPL ELPH-MCNC: 2.9 G/DL — LOW (ref 3.3–5)
ALP SERPL-CCNC: 234 U/L — HIGH (ref 40–120)
ALT FLD-CCNC: 23 U/L — SIGNIFICANT CHANGE UP (ref 10–45)
ANION GAP SERPL CALC-SCNC: 13 MMOL/L — SIGNIFICANT CHANGE UP (ref 5–17)
AST SERPL-CCNC: 17 U/L — SIGNIFICANT CHANGE UP (ref 10–40)
BASE EXCESS BLDA CALC-SCNC: -2.8 MMOL/L — LOW (ref -2–3)
BASE EXCESS BLDV CALC-SCNC: -2 MMOL/L — SIGNIFICANT CHANGE UP (ref -2–2)
BILIRUB DIRECT SERPL-MCNC: 0.1 MG/DL — SIGNIFICANT CHANGE UP (ref 0–0.3)
BILIRUB INDIRECT FLD-MCNC: 0.3 MG/DL — SIGNIFICANT CHANGE UP (ref 0.2–1)
BILIRUB SERPL-MCNC: 0.4 MG/DL — SIGNIFICANT CHANGE UP (ref 0.2–1.2)
BUN SERPL-MCNC: 24 MG/DL — HIGH (ref 7–23)
CA-I SERPL-SCNC: 1.29 MMOL/L — SIGNIFICANT CHANGE UP (ref 1.15–1.33)
CALCIUM SERPL-MCNC: 9.1 MG/DL — SIGNIFICANT CHANGE UP (ref 8.4–10.5)
CHLORIDE BLDV-SCNC: 105 MMOL/L — SIGNIFICANT CHANGE UP (ref 96–108)
CHLORIDE SERPL-SCNC: 106 MMOL/L — SIGNIFICANT CHANGE UP (ref 96–108)
CO2 BLDA-SCNC: 27 MMOL/L — HIGH (ref 19–24)
CO2 BLDV-SCNC: 27 MMOL/L — HIGH (ref 22–26)
CO2 SERPL-SCNC: 18 MMOL/L — LOW (ref 22–31)
CREAT SERPL-MCNC: 1.27 MG/DL — SIGNIFICANT CHANGE UP (ref 0.5–1.3)
EGFR: 39 ML/MIN/1.73M2 — LOW
GAS PNL BLDA: SIGNIFICANT CHANGE UP
GAS PNL BLDV: 137 MMOL/L — SIGNIFICANT CHANGE UP (ref 136–145)
GAS PNL BLDV: SIGNIFICANT CHANGE UP
GAS PNL BLDV: SIGNIFICANT CHANGE UP
GLUCOSE BLDC GLUCOMTR-MCNC: 220 MG/DL — HIGH (ref 70–99)
GLUCOSE BLDC GLUCOMTR-MCNC: 221 MG/DL — HIGH (ref 70–99)
GLUCOSE BLDC GLUCOMTR-MCNC: 223 MG/DL — HIGH (ref 70–99)
GLUCOSE BLDC GLUCOMTR-MCNC: 230 MG/DL — HIGH (ref 70–99)
GLUCOSE BLDC GLUCOMTR-MCNC: 252 MG/DL — HIGH (ref 70–99)
GLUCOSE BLDC GLUCOMTR-MCNC: 264 MG/DL — HIGH (ref 70–99)
GLUCOSE BLDV-MCNC: 234 MG/DL — HIGH (ref 70–99)
GLUCOSE SERPL-MCNC: 205 MG/DL — HIGH (ref 70–99)
HCO3 BLDA-SCNC: 25 MMOL/L — SIGNIFICANT CHANGE UP (ref 21–28)
HCO3 BLDV-SCNC: 25 MMOL/L — SIGNIFICANT CHANGE UP (ref 22–29)
HCT VFR BLD CALC: 29.1 % — LOW (ref 34.5–45)
HCT VFR BLDA CALC: 30 % — LOW (ref 34.5–46.5)
HGB BLD CALC-MCNC: 9.9 G/DL — LOW (ref 11.7–16.1)
HGB BLD-MCNC: 8.5 G/DL — LOW (ref 11.5–15.5)
LACTATE BLDV-MCNC: 1.7 MMOL/L — SIGNIFICANT CHANGE UP (ref 0.7–2)
MAGNESIUM SERPL-MCNC: 2.1 MG/DL — SIGNIFICANT CHANGE UP (ref 1.6–2.6)
MCHC RBC-ENTMCNC: 25.9 PG — LOW (ref 27–34)
MCHC RBC-ENTMCNC: 29.2 GM/DL — LOW (ref 32–36)
MCV RBC AUTO: 88.7 FL — SIGNIFICANT CHANGE UP (ref 80–100)
MRSA PCR RESULT.: SIGNIFICANT CHANGE UP
NRBC # BLD: 0 /100 WBCS — SIGNIFICANT CHANGE UP (ref 0–0)
PCO2 BLDA: 56 MMHG — HIGH (ref 32–45)
PCO2 BLDV: 55 MMHG — HIGH (ref 39–42)
PH BLDA: 7.26 — LOW (ref 7.35–7.45)
PH BLDV: 7.27 — LOW (ref 7.32–7.43)
PHOSPHATE SERPL-MCNC: 4.6 MG/DL — HIGH (ref 2.5–4.5)
PLATELET # BLD AUTO: 400 K/UL — SIGNIFICANT CHANGE UP (ref 150–400)
PO2 BLDA: 199 MMHG — HIGH (ref 83–108)
PO2 BLDV: 44 MMHG — SIGNIFICANT CHANGE UP (ref 25–45)
POTASSIUM BLDV-SCNC: 5.4 MMOL/L — HIGH (ref 3.5–5.1)
POTASSIUM SERPL-MCNC: 5.5 MMOL/L — HIGH (ref 3.5–5.3)
POTASSIUM SERPL-SCNC: 5.5 MMOL/L — HIGH (ref 3.5–5.3)
PROT SERPL-MCNC: 7.5 G/DL — SIGNIFICANT CHANGE UP (ref 6–8.3)
RBC # BLD: 3.28 M/UL — LOW (ref 3.8–5.2)
RBC # FLD: 15.9 % — HIGH (ref 10.3–14.5)
S AUREUS DNA NOSE QL NAA+PROBE: DETECTED
SAO2 % BLDA: 99 % — HIGH (ref 94–98)
SAO2 % BLDV: 73.5 % — SIGNIFICANT CHANGE UP (ref 67–88)
SODIUM SERPL-SCNC: 137 MMOL/L — SIGNIFICANT CHANGE UP (ref 135–145)
TSH SERPL-MCNC: 1.15 UIU/ML — SIGNIFICANT CHANGE UP (ref 0.27–4.2)
WBC # BLD: 10.91 K/UL — HIGH (ref 3.8–10.5)
WBC # FLD AUTO: 10.91 K/UL — HIGH (ref 3.8–10.5)

## 2022-05-17 PROCEDURE — 99223 1ST HOSP IP/OBS HIGH 75: CPT

## 2022-05-17 RX ORDER — PIPERACILLIN AND TAZOBACTAM 4; .5 G/20ML; G/20ML
3.38 INJECTION, POWDER, LYOPHILIZED, FOR SOLUTION INTRAVENOUS EVERY 8 HOURS
Refills: 0 | Status: DISCONTINUED | OUTPATIENT
Start: 2022-05-17 | End: 2022-05-19

## 2022-05-17 RX ORDER — DEXAMETHASONE 0.5 MG/5ML
6 ELIXIR ORAL DAILY
Refills: 0 | Status: DISCONTINUED | OUTPATIENT
Start: 2022-05-17 | End: 2022-05-18

## 2022-05-17 RX ORDER — REMDESIVIR 5 MG/ML
200 INJECTION INTRAVENOUS EVERY 24 HOURS
Refills: 0 | Status: COMPLETED | OUTPATIENT
Start: 2022-05-17 | End: 2022-05-17

## 2022-05-17 RX ORDER — LATANOPROST 0.05 MG/ML
1 SOLUTION/ DROPS OPHTHALMIC; TOPICAL
Qty: 0 | Refills: 0 | DISCHARGE

## 2022-05-17 RX ORDER — DEXTROSE 50 % IN WATER 50 %
25 SYRINGE (ML) INTRAVENOUS ONCE
Refills: 0 | Status: DISCONTINUED | OUTPATIENT
Start: 2022-05-17 | End: 2022-05-26

## 2022-05-17 RX ORDER — REMDESIVIR 5 MG/ML
INJECTION INTRAVENOUS
Refills: 0 | Status: COMPLETED | OUTPATIENT
Start: 2022-05-17 | End: 2022-05-21

## 2022-05-17 RX ORDER — DEXTROSE 50 % IN WATER 50 %
15 SYRINGE (ML) INTRAVENOUS ONCE
Refills: 0 | Status: DISCONTINUED | OUTPATIENT
Start: 2022-05-17 | End: 2022-05-26

## 2022-05-17 RX ORDER — VANCOMYCIN HCL 1 G
1000 VIAL (EA) INTRAVENOUS EVERY 24 HOURS
Refills: 0 | Status: DISCONTINUED | OUTPATIENT
Start: 2022-05-17 | End: 2022-05-19

## 2022-05-17 RX ORDER — BRIMONIDINE TARTRATE 2 MG/MG
1 SOLUTION/ DROPS OPHTHALMIC
Refills: 0 | Status: DISCONTINUED | OUTPATIENT
Start: 2022-05-17 | End: 2022-05-26

## 2022-05-17 RX ORDER — INSULIN GLARGINE 100 [IU]/ML
5 INJECTION, SOLUTION SUBCUTANEOUS EVERY MORNING
Refills: 0 | Status: DISCONTINUED | OUTPATIENT
Start: 2022-05-17 | End: 2022-05-26

## 2022-05-17 RX ORDER — DEXTROSE 50 % IN WATER 50 %
12.5 SYRINGE (ML) INTRAVENOUS ONCE
Refills: 0 | Status: DISCONTINUED | OUTPATIENT
Start: 2022-05-17 | End: 2022-05-26

## 2022-05-17 RX ORDER — SODIUM CHLORIDE 9 MG/ML
1000 INJECTION, SOLUTION INTRAVENOUS
Refills: 0 | Status: DISCONTINUED | OUTPATIENT
Start: 2022-05-17 | End: 2022-05-26

## 2022-05-17 RX ORDER — LATANOPROST 0.05 MG/ML
1 SOLUTION/ DROPS OPHTHALMIC; TOPICAL AT BEDTIME
Refills: 0 | Status: DISCONTINUED | OUTPATIENT
Start: 2022-05-17 | End: 2022-05-26

## 2022-05-17 RX ORDER — SODIUM ZIRCONIUM CYCLOSILICATE 10 G/10G
5 POWDER, FOR SUSPENSION ORAL ONCE
Refills: 0 | Status: COMPLETED | OUTPATIENT
Start: 2022-05-17 | End: 2022-05-17

## 2022-05-17 RX ORDER — LEVOTHYROXINE SODIUM 125 MCG
44 TABLET ORAL AT BEDTIME
Refills: 0 | Status: DISCONTINUED | OUTPATIENT
Start: 2022-05-17 | End: 2022-05-26

## 2022-05-17 RX ORDER — INSULIN LISPRO 100/ML
VIAL (ML) SUBCUTANEOUS EVERY 6 HOURS
Refills: 0 | Status: DISCONTINUED | OUTPATIENT
Start: 2022-05-17 | End: 2022-05-26

## 2022-05-17 RX ORDER — DONEPEZIL HYDROCHLORIDE 10 MG/1
1 TABLET, FILM COATED ORAL
Qty: 0 | Refills: 0 | DISCHARGE

## 2022-05-17 RX ORDER — REMDESIVIR 5 MG/ML
100 INJECTION INTRAVENOUS EVERY 24 HOURS
Refills: 0 | Status: COMPLETED | OUTPATIENT
Start: 2022-05-18 | End: 2022-05-21

## 2022-05-17 RX ORDER — BRIMONIDINE TARTRATE 2 MG/MG
1 SOLUTION/ DROPS OPHTHALMIC
Qty: 0 | Refills: 0 | DISCHARGE

## 2022-05-17 RX ORDER — FUROSEMIDE 40 MG
40 TABLET ORAL DAILY
Refills: 0 | Status: DISCONTINUED | OUTPATIENT
Start: 2022-05-17 | End: 2022-05-23

## 2022-05-17 RX ORDER — GLUCAGON INJECTION, SOLUTION 0.5 MG/.1ML
1 INJECTION, SOLUTION SUBCUTANEOUS ONCE
Refills: 0 | Status: DISCONTINUED | OUTPATIENT
Start: 2022-05-17 | End: 2022-05-26

## 2022-05-17 RX ORDER — CHLORHEXIDINE GLUCONATE 213 G/1000ML
1 SOLUTION TOPICAL
Refills: 0 | Status: DISCONTINUED | OUTPATIENT
Start: 2022-05-17 | End: 2022-05-26

## 2022-05-17 RX ADMIN — Medication 40 MILLIGRAM(S): at 05:58

## 2022-05-17 RX ADMIN — PIPERACILLIN AND TAZOBACTAM 25 GRAM(S): 4; .5 INJECTION, POWDER, LYOPHILIZED, FOR SOLUTION INTRAVENOUS at 03:08

## 2022-05-17 RX ADMIN — INSULIN GLARGINE 5 UNIT(S): 100 INJECTION, SOLUTION SUBCUTANEOUS at 08:57

## 2022-05-17 RX ADMIN — BRIMONIDINE TARTRATE 1 DROP(S): 2 SOLUTION/ DROPS OPHTHALMIC at 17:22

## 2022-05-17 RX ADMIN — LATANOPROST 1 DROP(S): 0.05 SOLUTION/ DROPS OPHTHALMIC; TOPICAL at 22:09

## 2022-05-17 RX ADMIN — BRIMONIDINE TARTRATE 1 DROP(S): 2 SOLUTION/ DROPS OPHTHALMIC at 12:22

## 2022-05-17 RX ADMIN — Medication 3: at 17:22

## 2022-05-17 RX ADMIN — REMDESIVIR 500 MILLIGRAM(S): 5 INJECTION INTRAVENOUS at 13:25

## 2022-05-17 RX ADMIN — Medication 250 MILLIGRAM(S): at 05:58

## 2022-05-17 RX ADMIN — Medication 3: at 12:21

## 2022-05-17 RX ADMIN — SODIUM ZIRCONIUM CYCLOSILICATE 5 GRAM(S): 10 POWDER, FOR SUSPENSION ORAL at 15:17

## 2022-05-17 RX ADMIN — PIPERACILLIN AND TAZOBACTAM 25 GRAM(S): 4; .5 INJECTION, POWDER, LYOPHILIZED, FOR SOLUTION INTRAVENOUS at 12:22

## 2022-05-17 RX ADMIN — Medication 2: at 06:04

## 2022-05-17 RX ADMIN — Medication 6 MILLIGRAM(S): at 05:59

## 2022-05-17 RX ADMIN — PIPERACILLIN AND TAZOBACTAM 25 GRAM(S): 4; .5 INJECTION, POWDER, LYOPHILIZED, FOR SOLUTION INTRAVENOUS at 18:49

## 2022-05-17 RX ADMIN — CHLORHEXIDINE GLUCONATE 1 APPLICATION(S): 213 SOLUTION TOPICAL at 13:53

## 2022-05-17 RX ADMIN — Medication 44 MICROGRAM(S): at 22:51

## 2022-05-17 NOTE — PATIENT PROFILE ADULT - FALL HARM RISK - HARM RISK INTERVENTIONS
Assistance with ambulation/Assistance OOB with selected safe patient handling equipment/Communicate Risk of Fall with Harm to all staff/Discuss with provider need for PT consult/Monitor for mental status changes/Monitor gait and stability/Move patient closer to nurses' station/Provide patient with walking aids - walker, cane, crutches/Reinforce activity limits and safety measures with patient and family/Reorient to person, place and time as needed/Tailored Fall Risk Interventions/Toileting schedule using arm’s reach rule for commode and bathroom/Use of alarms - bed, chair and/or voice tab/Visual Cue: Yellow wristband and red socks/Bed in lowest position, wheels locked, appropriate side rails in place/Call bell, personal items and telephone in reach/Instruct patient to call for assistance before getting out of bed or chair/Non-slip footwear when patient is out of bed/[place] to call system/Physically safe environment - no spills, clutter or unnecessary equipment/Purposeful Proactive Rounding/Room/bathroom lighting operational, light cord in reach

## 2022-05-17 NOTE — H&P ADULT - NSHPLABSRESULTS_GEN_ALL_CORE
Personally reviewed labs, ekg and imaging    EKG: no discernable p waves, irregularly irregular     CXR: bilateral pleural effusions, with consolidations/atelectasis within the lower lobes

## 2022-05-17 NOTE — H&P ADULT - PROBLEM SELECTOR PLAN 3
-hold home amlodipine due to NPO -not on treatment (AC or rate/rhythm control) per outside nursing home documents   -monitor on telemetry

## 2022-05-17 NOTE — H&P ADULT - NSHPPHYSICALEXAM_GEN_ALL_CORE
Vital Signs Last 24 Hrs  T(C): 36.6 (16 May 2022 22:50), Max: 36.8 (16 May 2022 14:20)  T(F): 97.8 (16 May 2022 22:50), Max: 98.3 (16 May 2022 14:20)  HR: 74 (16 May 2022 22:50) (74 - 93)  BP: 116/67 (16 May 2022 22:50) (98/52 - 151/87)  BP(mean): 90 (16 May 2022 20:15) (66 - 90)  RR: 18 (16 May 2022 22:50) (17 - 22)  SpO2: 100% (16 May 2022 22:50) (100% - 100%)

## 2022-05-17 NOTE — H&P ADULT - ASSESSMENT
89yo F w/ PMHx of DM, HTN, CVA (residual left sided weakness), hypothyroidism presents with acute hypoxic respiratory failure in setting of covid positive 89yo F w/ PMHx of DM, afib (not on AC), HTN, CVA (residual left sided weakness), hypothyroidism presents with acute hypoxic respiratory failure in setting of covid positive

## 2022-05-17 NOTE — CONSULT NOTE ADULT - SUBJECTIVE AND OBJECTIVE BOX
Reason for consult:    HPI:  89yo F w/ PMHx of DM, afib (not on AC), HTN, CVA (residual left sided weakness), hypothyroidism presents with shortness of breath, pt unable to answer questions, history obtained from chart review, patient presents from [facility] home ([facility] [place]) via EMS for shortness of breath, hypoxia and lethargy, reportedly hypoxic to the 60's on room air, placed on bipap on arrival due to hypoxia but otherwise was hemodynamically stable, afebrile, saturating well on room air, labs were significant for normocytic anemia, leukocytosis, decreased GFR, increased BNP, respiratory acidosis, COVID positive, CT chest showed pleural effusions with compressive atelectasis, MICU was consulted in the ED, patient was given Zosyn, dexamethasone, furosemide 20x1, haldol 5mg x1, admitted to medicine telemetry for further management.  (17 May 2022 00:40)    Hematology/Onclogy consulted in light of patients anemia and IgG lambda monoclonal gammopathy. Said patient is under the care of Dr. Batista of [facility].      PAST MEDICAL & SURGICAL HISTORY:  HTN (hypertension)      HLD (hyperlipidemia)      DM (diabetes mellitus)      Hypothyroidism      TIA (transient ischemic attack)      S/P cholecystectomy          FAMILY HISTORY:  No pertinent family history in first degree relatives        Alochol: Denied  Smoking: Nonsmoker  Drug Use: Denied  Marital Status:         Allergies    No Known Allergies    Intolerances        MEDICATIONS  (STANDING):  brimonidine 0.2% Ophthalmic Solution 1 Drop(s) Both EYES two times a day  dexAMETHasone  Injectable 6 milliGRAM(s) IV Push daily  dextrose 5%. 1000 milliLiter(s) (100 mL/Hr) IV Continuous <Continuous>  dextrose 5%. 1000 milliLiter(s) (50 mL/Hr) IV Continuous <Continuous>  dextrose 50% Injectable 25 Gram(s) IV Push once  dextrose 50% Injectable 12.5 Gram(s) IV Push once  dextrose 50% Injectable 25 Gram(s) IV Push once  furosemide   Injectable 40 milliGRAM(s) IV Push daily  glucagon  Injectable 1 milliGRAM(s) IntraMuscular once  insulin glargine Injectable (LANTUS) 5 Unit(s) SubCutaneous every morning  insulin lispro (ADMELOG) corrective regimen sliding scale   SubCutaneous every 6 hours  latanoprost 0.005% Ophthalmic Solution 1 Drop(s) Both EYES at bedtime  levothyroxine Injectable 44 MICROGram(s) IV Push at bedtime  piperacillin/tazobactam IVPB.. 3.375 Gram(s) IV Intermittent every 8 hours  remdesivir  IVPB 200 milliGRAM(s) IV Intermittent every 24 hours  remdesivir  IVPB   IV Intermittent   vancomycin  IVPB 1000 milliGRAM(s) IV Intermittent every 24 hours    MEDICATIONS  (PRN):  dextrose Oral Gel 15 Gram(s) Oral once PRN Blood Glucose LESS THAN 70 milliGRAM(s)/deciliter      ROS  No fever, sweats, chills  No epistaxis, HA, sore throat  No CP, SOB, cough, sputum  No n/v/d, abd pain, melena, hematochezia  No edema  No rash  No anxiety  No back pain, joint pain  No bleeding, bruising  No dysuria, hematuria    T(C): 36.5 (05-17-22 @ 03:08), Max: 36.8 (05-16-22 @ 14:20)  HR: 69 (05-17-22 @ 05:26) (69 - 93)  BP: 152/80 (05-17-22 @ 03:08) (98/52 - 152/80)  RR: 18 (05-17-22 @ 03:08) (17 - 22)  SpO2: 99% (05-17-22 @ 05:26) (99% - 100%)  Wt(kg): --    PE  NAD  Awake, alert  Anicteric, MMM  RRR  CTAB  Abd soft, NT, ND  No c/c/e  No rash grossly  FROM                          8.5    10.91 )-----------( 400      ( 17 May 2022 07:07 )             29.1       05-17    137  |  106  |  24<H>  ----------------------------<  205<H>  5.5<H>   |  18<L>  |  1.27    Ca    9.1      17 May 2022 07:07  Phos  4.6     05-17  Mg     2.1     05-17    TPro  7.5  /  Alb  2.9<L>  /  TBili  0.4  /  DBili  0.1  /  AST  17  /  ALT  23  /  AlkPhos  234<H>  05-17   Reason for consult:    HPI:  89yo F w/ PMHx of DM, afib (not on AC), HTN, CVA (residual left sided weakness), hypothyroidism presents with shortness of breath, pt unable to answer questions, history obtained from chart review, patient presents from nursing home ([facility] [place]) via EMS for shortness of breath, hypoxia and lethargy, reportedly hypoxic to the 60's on room air, placed on bipap on arrival due to hypoxia but otherwise was hemodynamically stable, afebrile, saturating well on room air, labs were significant for normocytic anemia, leukocytosis, decreased GFR, increased BNP, respiratory acidosis, COVID positive, CT chest showed pleural effusions with compressive atelectasis, MICU was consulted in the ED, patient was given Zosyn, dexamethasone, furosemide 20x1, haldol 5mg x1, admitted to medicine telemetry for further management.  (17 May 2022 00:40)    Hematology/Onclogy consulted in light of patients anemia and IgG lambda monoclonal gammopathy. Said patient is under the care of Dr. Batista of [facility].    Patient seen and examined at bedside.     PAST MEDICAL & SURGICAL HISTORY:  HTN (hypertension)      HLD (hyperlipidemia)      DM (diabetes mellitus)      Hypothyroidism      TIA (transient ischemic attack)      S/P cholecystectomy          FAMILY HISTORY:  No pertinent family history in first degree relatives        Alochol: Denied  Smoking: Nonsmoker  Drug Use: Denied  Marital Status:         Allergies    No Known Allergies    Intolerances        MEDICATIONS  (STANDING):  brimonidine 0.2% Ophthalmic Solution 1 Drop(s) Both EYES two times a day  dexAMETHasone  Injectable 6 milliGRAM(s) IV Push daily  dextrose 5%. 1000 milliLiter(s) (100 mL/Hr) IV Continuous <Continuous>  dextrose 5%. 1000 milliLiter(s) (50 mL/Hr) IV Continuous <Continuous>  dextrose 50% Injectable 25 Gram(s) IV Push once  dextrose 50% Injectable 12.5 Gram(s) IV Push once  dextrose 50% Injectable 25 Gram(s) IV Push once  furosemide   Injectable 40 milliGRAM(s) IV Push daily  glucagon  Injectable 1 milliGRAM(s) IntraMuscular once  insulin glargine Injectable (LANTUS) 5 Unit(s) SubCutaneous every morning  insulin lispro (ADMELOG) corrective regimen sliding scale   SubCutaneous every 6 hours  latanoprost 0.005% Ophthalmic Solution 1 Drop(s) Both EYES at bedtime  levothyroxine Injectable 44 MICROGram(s) IV Push at bedtime  piperacillin/tazobactam IVPB.. 3.375 Gram(s) IV Intermittent every 8 hours  remdesivir  IVPB 200 milliGRAM(s) IV Intermittent every 24 hours  remdesivir  IVPB   IV Intermittent   vancomycin  IVPB 1000 milliGRAM(s) IV Intermittent every 24 hours    MEDICATIONS  (PRN):  dextrose Oral Gel 15 Gram(s) Oral once PRN Blood Glucose LESS THAN 70 milliGRAM(s)/deciliter    T(C): 36.5 (05-17-22 @ 03:08), Max: 36.8 (05-16-22 @ 14:20)  HR: 69 (05-17-22 @ 05:26) (69 - 93)  BP: 152/80 (05-17-22 @ 03:08) (98/52 - 152/80)  RR: 18 (05-17-22 @ 03:08) (17 - 22)  SpO2: 99% (05-17-22 @ 05:26) (99% - 100%)  Wt(kg): --    PE  NAD  Awake,  Anicteric, MMM  No c/c/e  No rash grossly                          8.5    10.91 )-----------( 400      ( 17 May 2022 07:07 )             29.1       05-17    137  |  106  |  24<H>  ----------------------------<  205<H>  5.5<H>   |  18<L>  |  1.27    Ca    9.1      17 May 2022 07:07  Phos  4.6     05-17  Mg     2.1     05-17    TPro  7.5  /  Alb  2.9<L>  /  TBili  0.4  /  DBili  0.1  /  AST  17  /  ALT  23  /  AlkPhos  234<H>  05-17

## 2022-05-17 NOTE — CONSULT NOTE ADULT - ASSESSMENT
HPI:  89yo F w/ PMHx of DM, afib (not on AC), HTN, CVA (residual left sided weakness), hypothyroidism presents with shortness of breath, pt unable to answer questions, history obtained from chart review, patient presents from nursing home (the [facility] in [place]) via EMS for shortness of breath, hypoxia and lethargy, reportedly hypoxic to the 60's on room air, placed on bipap on arrival due to hypoxia but otherwise was hemodynamically stable, afebrile, saturating well on room air, labs were significant for normocytic anemia, leukocytosis, decreased GFR, increased BNP, respiratory acidosis, COVID positive, CT chest showed pleural effusions with compressive atelectasis, MICU was consulted in the ED, patient was given Zosyn, dexamethasone, furosemide 20x1, haldol 5mg x1, admitted to medicine telemetry for further management.  (17 May 2022 00:40)    Hematology/Onclogy consulted in light of patients anemia and IgG lambda monoclonal gammopathy. Said patient is under the care of Dr. Batista of [facility].    MGUS  -- under the care of Dr. Batista of [facility]  -- M-Jack 1.5 noted on outside lab work collected 4/22  -- pt declined BMB d/t advanced age and co-morbidities      HPI:  89yo F w/ PMHx of DM, afib (not on AC), HTN, CVA (residual left sided weakness), hypothyroidism presents with shortness of breath, pt unable to answer questions, history obtained from chart review, patient presents from nursing home (the [facility] in [place]) via EMS for shortness of breath, hypoxia and lethargy, reportedly hypoxic to the 60's on room air, placed on bipap on arrival due to hypoxia but otherwise was hemodynamically stable, afebrile, saturating well on room air, labs were significant for normocytic anemia, leukocytosis, decreased GFR, increased BNP, respiratory acidosis, COVID positive, CT chest showed pleural effusions with compressive atelectasis, MICU was consulted in the ED, patient was given Zosyn, dexamethasone, furosemide 20x1, haldol 5mg x1, admitted to medicine telemetry for further management.  (17 May 2022 00:40)    Hematology/Onclogy consulted in light of patients anemia and IgG lambda monoclonal gammopathy. Said patient is under the care of Dr. Batista of [facility].    MGUS  -- under the care of Dr. Batista of [facility]  -- currently under active surveillance   -- M-Jack 1.5 noted on outside lab work collected 4/22  -- pt declined BMB d/t advanced age and co-morbidities     Anemia  --likely multifactorial  --counts stable, Hgb 8.5  --please transfuse for Hgb < 7    Thank you for the opportunity to contribute to Mrs Billingsley's care    After discharge patient will follow up with Dr. Batista.    Becca Lamar NP  Hematology/ Oncology  [facility]  [phone] (office)  [phone] (alt office)  Evenings and weekends please call MD on call or office

## 2022-05-17 NOTE — H&P ADULT - PROBLEM SELECTOR PLAN 1
-in setting of COVID positive, possible new onset heart failure and/or bacterial pneumonia  -encephalopathy likely in setting of hypercarbia  -c/w dexamethasone daily  -start remdesivir   -start lasix 40mg daily  -obtain echo complete  -obtain MRSA swab   -c/w vancomycin and zosyn   -repeat VBG in AM  -wean bipap as tolerated -in setting of COVID positive, possible new onset heart failure and/or bacterial pneumonia  -encephalopathy likely in setting of hypercarbia  -c/w dexamethasone daily  -start remdesivir   -obtain D-dimer   -start lasix 40mg daily  -obtain echo complete  -obtain MRSA swab   -c/w vancomycin and zosyn   -repeat VBG in AM  -wean bipap as tolerated

## 2022-05-17 NOTE — H&P ADULT - HISTORY OF PRESENT ILLNESS
89yo F w/ PMHx of DM, HTN, CVA (residual left sided weakness), hypothyroidism presents with shortness of breath, pt unable to answer questions, history obtained from chart review, patient presents from nursing home ([facility] [place]) via EMS for shortness of breath, hypoxia and lethargy, reportedly hypoxic to the 60's on room air, placed on bipap on arrival due to hypoxia but otherwise was hemodynamically stable, afebrile, saturating well on room air, labs were significant for normocytic anemia, leukocytosis, decreased GFR, increased BNP, respiratory acidosis, COVID positive, CT chest showed pleural effusions with compressive atelectasis, MICU was consulted in the ED, patient was given Zosyn, dexamethasone, furosemide 20x1, haldol 5mg x1, admitted to medicine telemetry for further management.  89yo F w/ PMHx of DM, afib (not on AC), HTN, CVA (residual left sided weakness), hypothyroidism presents with shortness of breath, pt unable to answer questions, history obtained from chart review, patient presents from nursing home (the [facility] in [place]) via EMS for shortness of breath, hypoxia and lethargy, reportedly hypoxic to the 60's on room air, placed on bipap on arrival due to hypoxia but otherwise was hemodynamically stable, afebrile, saturating well on room air, labs were significant for normocytic anemia, leukocytosis, decreased GFR, increased BNP, respiratory acidosis, COVID positive, CT chest showed pleural effusions with compressive atelectasis, MICU was consulted in the ED, patient was given Zosyn, dexamethasone, furosemide 20x1, haldol 5mg x1, admitted to medicine telemetry for further management.

## 2022-05-18 DIAGNOSIS — U07.1 COVID-19: ICD-10-CM

## 2022-05-18 DIAGNOSIS — J90 PLEURAL EFFUSION, NOT ELSEWHERE CLASSIFIED: ICD-10-CM

## 2022-05-18 DIAGNOSIS — F03.90 UNSPECIFIED DEMENTIA WITHOUT BEHAVIORAL DISTURBANCE: ICD-10-CM

## 2022-05-18 LAB
A1C WITH ESTIMATED AVERAGE GLUCOSE RESULT: 8.9 % — HIGH (ref 4–5.6)
ALBUMIN SERPL ELPH-MCNC: 3.5 G/DL — SIGNIFICANT CHANGE UP (ref 3.3–5)
ALBUMIN SERPL ELPH-MCNC: 3.5 G/DL — SIGNIFICANT CHANGE UP (ref 3.3–5)
ALP SERPL-CCNC: 269 U/L — HIGH (ref 40–120)
ALP SERPL-CCNC: 271 U/L — HIGH (ref 40–120)
ALT FLD-CCNC: 25 U/L — SIGNIFICANT CHANGE UP (ref 10–45)
ALT FLD-CCNC: 26 U/L — SIGNIFICANT CHANGE UP (ref 10–45)
ANION GAP SERPL CALC-SCNC: 16 MMOL/L — SIGNIFICANT CHANGE UP (ref 5–17)
AST SERPL-CCNC: 18 U/L — SIGNIFICANT CHANGE UP (ref 10–40)
AST SERPL-CCNC: 18 U/L — SIGNIFICANT CHANGE UP (ref 10–40)
BILIRUB DIRECT SERPL-MCNC: 0.2 MG/DL — SIGNIFICANT CHANGE UP (ref 0–0.3)
BILIRUB INDIRECT FLD-MCNC: 0.2 MG/DL — SIGNIFICANT CHANGE UP (ref 0.2–1)
BILIRUB SERPL-MCNC: 0.4 MG/DL — SIGNIFICANT CHANGE UP (ref 0.2–1.2)
BILIRUB SERPL-MCNC: 0.5 MG/DL — SIGNIFICANT CHANGE UP (ref 0.2–1.2)
BUN SERPL-MCNC: 28 MG/DL — HIGH (ref 7–23)
CALCIUM SERPL-MCNC: 9.5 MG/DL — SIGNIFICANT CHANGE UP (ref 8.4–10.5)
CHLORIDE SERPL-SCNC: 105 MMOL/L — SIGNIFICANT CHANGE UP (ref 96–108)
CO2 SERPL-SCNC: 22 MMOL/L — SIGNIFICANT CHANGE UP (ref 22–31)
CREAT SERPL-MCNC: 1.34 MG/DL — HIGH (ref 0.5–1.3)
CREAT SERPL-MCNC: 1.36 MG/DL — HIGH (ref 0.5–1.3)
D DIMER BLD IA.RAPID-MCNC: 592 NG/ML DDU — HIGH
EGFR: 36 ML/MIN/1.73M2 — LOW
EGFR: 37 ML/MIN/1.73M2 — LOW
ESTIMATED AVERAGE GLUCOSE: 209 MG/DL — HIGH (ref 68–114)
FERRITIN SERPL-MCNC: 1211 NG/ML — HIGH (ref 15–150)
GLUCOSE BLDC GLUCOMTR-MCNC: 164 MG/DL — HIGH (ref 70–99)
GLUCOSE BLDC GLUCOMTR-MCNC: 191 MG/DL — HIGH (ref 70–99)
GLUCOSE BLDC GLUCOMTR-MCNC: 197 MG/DL — HIGH (ref 70–99)
GLUCOSE BLDC GLUCOMTR-MCNC: 203 MG/DL — HIGH (ref 70–99)
GLUCOSE BLDC GLUCOMTR-MCNC: 216 MG/DL — HIGH (ref 70–99)
GLUCOSE BLDC GLUCOMTR-MCNC: 78 MG/DL — SIGNIFICANT CHANGE UP (ref 70–99)
GLUCOSE BLDC GLUCOMTR-MCNC: 95 MG/DL — SIGNIFICANT CHANGE UP (ref 70–99)
GLUCOSE SERPL-MCNC: 58 MG/DL — LOW (ref 70–99)
HCT VFR BLD CALC: 29.4 % — LOW (ref 34.5–45)
HGB BLD-MCNC: 8.9 G/DL — LOW (ref 11.5–15.5)
INR BLD: 1.27 RATIO — HIGH (ref 0.88–1.16)
MCHC RBC-ENTMCNC: 25.9 PG — LOW (ref 27–34)
MCHC RBC-ENTMCNC: 30.3 GM/DL — LOW (ref 32–36)
MCV RBC AUTO: 85.7 FL — SIGNIFICANT CHANGE UP (ref 80–100)
NRBC # BLD: 0 /100 WBCS — SIGNIFICANT CHANGE UP (ref 0–0)
PLATELET # BLD AUTO: 487 K/UL — HIGH (ref 150–400)
POTASSIUM SERPL-MCNC: 4.4 MMOL/L — SIGNIFICANT CHANGE UP (ref 3.5–5.3)
POTASSIUM SERPL-SCNC: 4.4 MMOL/L — SIGNIFICANT CHANGE UP (ref 3.5–5.3)
PROCALCITONIN SERPL-MCNC: 0.24 NG/ML — HIGH (ref 0.02–0.1)
PROT SERPL-MCNC: 7.8 G/DL — SIGNIFICANT CHANGE UP (ref 6–8.3)
PROT SERPL-MCNC: 7.8 G/DL — SIGNIFICANT CHANGE UP (ref 6–8.3)
PROTHROM AB SERPL-ACNC: 14.8 SEC — HIGH (ref 10.5–13.4)
RBC # BLD: 3.43 M/UL — LOW (ref 3.8–5.2)
RBC # FLD: 15.8 % — HIGH (ref 10.3–14.5)
SODIUM SERPL-SCNC: 143 MMOL/L — SIGNIFICANT CHANGE UP (ref 135–145)
WBC # BLD: 13.74 K/UL — HIGH (ref 3.8–10.5)
WBC # FLD AUTO: 13.74 K/UL — HIGH (ref 3.8–10.5)

## 2022-05-18 RX ORDER — ENOXAPARIN SODIUM 100 MG/ML
80 INJECTION SUBCUTANEOUS EVERY 12 HOURS
Refills: 0 | Status: DISCONTINUED | OUTPATIENT
Start: 2022-05-18 | End: 2022-05-18

## 2022-05-18 RX ORDER — ENOXAPARIN SODIUM 100 MG/ML
30 INJECTION SUBCUTANEOUS EVERY 24 HOURS
Refills: 0 | Status: DISCONTINUED | OUTPATIENT
Start: 2022-05-18 | End: 2022-05-19

## 2022-05-18 RX ADMIN — REMDESIVIR 500 MILLIGRAM(S): 5 INJECTION INTRAVENOUS at 11:20

## 2022-05-18 RX ADMIN — PIPERACILLIN AND TAZOBACTAM 25 GRAM(S): 4; .5 INJECTION, POWDER, LYOPHILIZED, FOR SOLUTION INTRAVENOUS at 03:21

## 2022-05-18 RX ADMIN — Medication 1: at 12:43

## 2022-05-18 RX ADMIN — INSULIN GLARGINE 5 UNIT(S): 100 INJECTION, SOLUTION SUBCUTANEOUS at 08:14

## 2022-05-18 RX ADMIN — PIPERACILLIN AND TAZOBACTAM 25 GRAM(S): 4; .5 INJECTION, POWDER, LYOPHILIZED, FOR SOLUTION INTRAVENOUS at 13:46

## 2022-05-18 RX ADMIN — Medication 1: at 17:54

## 2022-05-18 RX ADMIN — PIPERACILLIN AND TAZOBACTAM 25 GRAM(S): 4; .5 INJECTION, POWDER, LYOPHILIZED, FOR SOLUTION INTRAVENOUS at 22:02

## 2022-05-18 RX ADMIN — Medication 44 MICROGRAM(S): at 22:02

## 2022-05-18 RX ADMIN — Medication 1: at 01:33

## 2022-05-18 RX ADMIN — BRIMONIDINE TARTRATE 1 DROP(S): 2 SOLUTION/ DROPS OPHTHALMIC at 17:43

## 2022-05-18 RX ADMIN — Medication 40 MILLIGRAM(S): at 06:21

## 2022-05-18 RX ADMIN — ENOXAPARIN SODIUM 30 MILLIGRAM(S): 100 INJECTION SUBCUTANEOUS at 17:48

## 2022-05-18 RX ADMIN — Medication 250 MILLIGRAM(S): at 08:15

## 2022-05-18 RX ADMIN — Medication 6 MILLIGRAM(S): at 06:22

## 2022-05-18 RX ADMIN — LATANOPROST 1 DROP(S): 0.05 SOLUTION/ DROPS OPHTHALMIC; TOPICAL at 22:03

## 2022-05-18 RX ADMIN — BRIMONIDINE TARTRATE 1 DROP(S): 2 SOLUTION/ DROPS OPHTHALMIC at 06:20

## 2022-05-18 NOTE — CONSULT NOTE ADULT - NS ATTEND AMEND GEN_ALL_CORE FT
hypoxia likely from bilateral lower lobe atelectasis secondary to effusions no evidence of covid pna on ct chest  No pe. Suggest bipap q hs, keep sat>90%, o>I as tolerated.  Lovenox 30mg bid. fu duplex le's  observe off abx  david todd and daughter

## 2022-05-18 NOTE — CONSULT NOTE ADULT - REASON FOR ADMISSION
hypoxic/hypercarbic respiratory failure

## 2022-05-18 NOTE — CONSULT NOTE ADULT - SUBJECTIVE AND OBJECTIVE BOX
PULMONARY CONSULT    HPI: 89 y/o F with PMH of DM, afib (not on AC), HTN, CVA (residual left sided weakness), hypothyroidism. Presents with SOB, hypoxia.  Presents from nursing home ([facility] [place]) via EMS for shortness of breath, hypoxia and lethargy, reportedly hypoxic to the 60's on room air, placed on bipap on arrival due to hypoxia. ABG with respiratory acidosis. COVID positive, CT chest showed pleural effusions with compressive atelectasis, MICU was consulted in the ED, patient was given Zosyn, dexamethasone, furosemide 20x1, haldol 5mg x1, admitted to medicine telemetry for further management.  (17 May 2022 00:40)          PAST MEDICAL & SURGICAL HISTORY:  HTN (hypertension)  HLD (hyperlipidemia)  DM (diabetes mellitus)  Hypothyroidism  TIA (transient ischemic attack)  S/P cholecystectomy      Allergies  No Known Allergies    Intolerances      FAMILY HISTORY:  No pertinent family history in first degree relatives      Social history: never a smoker     Review of Systems: unable to obtain, pt confused       Medications:  MEDICATIONS  (STANDING):  brimonidine 0.2% Ophthalmic Solution 1 Drop(s) Both EYES two times a day  chlorhexidine 4% Liquid 1 Application(s) Topical <User Schedule>  dextrose 5%. 1000 milliLiter(s) (100 mL/Hr) IV Continuous <Continuous>  dextrose 5%. 1000 milliLiter(s) (50 mL/Hr) IV Continuous <Continuous>  dextrose 50% Injectable 25 Gram(s) IV Push once  dextrose 50% Injectable 12.5 Gram(s) IV Push once  dextrose 50% Injectable 25 Gram(s) IV Push once  enoxaparin Injectable 80 milliGRAM(s) SubCutaneous every 12 hours  furosemide   Injectable 40 milliGRAM(s) IV Push daily  glucagon  Injectable 1 milliGRAM(s) IntraMuscular once  insulin glargine Injectable (LANTUS) 5 Unit(s) SubCutaneous every morning  insulin lispro (ADMELOG) corrective regimen sliding scale   SubCutaneous every 6 hours  latanoprost 0.005% Ophthalmic Solution 1 Drop(s) Both EYES at bedtime  levothyroxine Injectable 44 MICROGram(s) IV Push at bedtime  piperacillin/tazobactam IVPB.. 3.375 Gram(s) IV Intermittent every 8 hours  remdesivir  IVPB 100 milliGRAM(s) IV Intermittent every 24 hours  remdesivir  IVPB   IV Intermittent   vancomycin  IVPB 1000 milliGRAM(s) IV Intermittent every 24 hours    MEDICATIONS  (PRN):  dextrose Oral Gel 15 Gram(s) Oral once PRN Blood Glucose LESS THAN 70 milliGRAM(s)/deciliter            Vital Signs Last 24 Hrs  T(C): 36.6 (18 May 2022 07:46), Max: 36.8 (17 May 2022 22:31)  T(F): 97.8 (18 May 2022 07:46), Max: 98.3 (17 May 2022 22:31)  HR: 79 (18 May 2022 09:34) (73 - 86)  BP: 143/60 (18 May 2022 07:46) (105/74 - 157/93)  BP(mean): --  RR: 20 (18 May 2022 09:34) (18 - 20)  SpO2: 97% (18 May 2022 09:34) (95% - 99%)    ABG - ( 17 May 2022 00:52 )  pH, Arterial: 7.26  pH, Blood: x     /  pCO2: 56    /  pO2: 199   / HCO3: 25    / Base Excess: -2.8  /  SaO2: 99.0              VBG pH 7.27 05-17 @ 10:25  VBG pCO2 55 05-17 @ 10:25  VBG O2 sat 73.5 05-17 @ 10:25  VBG lactate 1.7 05-17 @ 10:25  VBG pH 7.21 05-16 @ 16:38  VBG pCO2 63 05-16 @ 16:38  VBG O2 sat 60.9 05-16 @ 16:38  VBG lactate 1.9 05-16 @ 16:38        05-17 @ 07:01  -  05-18 @ 07:00  --------------------------------------------------------  IN: 500 mL / OUT: 0 mL / NET: 500 mL          LABS:                        8.9    13.74 )-----------( 487      ( 18 May 2022 06:50 )             29.4     05-18    143  |  105  |  28<H>  ----------------------------<  58<L>  4.4   |  22  |  1.34<H>    Ca    9.5      18 May 2022 06:49  Phos  4.6     05-17  Mg     2.1     05-17    TPro  7.8  /  Alb  3.5  /  TBili  0.5  /  DBili  0.2  /  AST  18  /  ALT  25  /  AlkPhos  269<H>  05-18          CAPILLARY BLOOD GLUCOSE      POCT Blood Glucose.: 191 mg/dL (18 May 2022 11:53)    PT/INR - ( 18 May 2022 06:50 )   PT: 14.8 sec;   INR: 1.27 ratio             Procalcitonin, Serum: 0.24 ng/mL (05-18-22 @ 06:49)    Serum Pro-Brain Natriuretic Peptide: 5061 pg/mL (05-16-22 @ 14:26)              CULTURES:      (collected 05-16-22 @ 13:43)  Source: .Blood Blood-Peripheral  Preliminary Report (05-17-22 @ 18:01):    No growth to date.     (collected 05-16-22 @ 13:32)  Source: .Blood Blood-Peripheral  Preliminary Report (05-17-22 @ 18:01):    No growth to date.                      Physical Examination:    General: No acute distress.      HEENT: Pupils equal, reactive to light.  Symmetric.    PULM: decreased BS at bases     CVS: S1, S2    ABD: Soft, nondistended, nontender, normoactive bowel sounds, no masses    EXT: No edema, nontender    SKIN: Warm and well perfused, no rashes noted.    NEURO: A&O x1        RADIOLOGY REVIEWED  CT chest: < from: CT Angio Chest PE Protocol w/ IV Cont (05.16.22 @ 18:38) >  FINDINGS:    LUNGS AND AIRWAYS: Bilateral lower lung areas of compressive atelectasis   with complete involvement of the bilateral lower lobes. Left upper lobe   areas of atelectasis with involvement of the lingula.  PLEURA: New moderate bilateral pleural effusions.  MEDIASTINUM AND GLENNY: No lymphadenopathy. Small hiatal hernia.  VESSELS: This study is limited for evaluation of the subsegmental or   smaller pulmonary arterial branches due to superimposed respiratory   motion artifact. No pulmonary arterial emboli are identified within the   well visualized pulmonary arteries. Atherosclerotic changes of the aorta   and coronary arteries. Main pulmonary artery appears enlarged which can   be seen in the setting of pulmonary arterial hypertension.  HEART: Heart size is enlarged. No pericardial effusion. Mitral annular   calcifications. Biatrial enlargement.  CHEST WALL AND LOWER NECK: Within normal limits. Subcutaneous edema.  VISUALIZED UPPER ABDOMEN: Left renalcyst.  BONES: Degenerative changes.    IMPRESSION:  Limited study for evaluation of the subsegmental or smaller pulmonary   arterial branches. No pulmonary arterial emboli within the well   visualized pulmonary arteries.    New moderate bilateral pleural effusions with bilateral lower lung areas   of atelectasis, left greater than right, including complete compressive   atelectasis of the bilateral lower lobes.    < end of copied text >     PULMONARY CONSULT    HPI: 89 y/o F with PMH of DM, afib (not on AC), HTN, CVA (residual left sided weakness), hypothyroidism. Presents with SOB, hypoxia.  Presents from nursing home ([facility] in [place]) via EMS for shortness of breath, hypoxia and lethargy, reportedly hypoxic to the 60's on room air, placed on bipap on arrival due to hypoxia. ABG with respiratory acidosis. Found to be COVID positive COVID positive on 5/16 while at NH. CTA chest neg PE, b/l pl effusion with compressive atelectasis. Seen on HFNC, nonlabored sats high 90s. Denies SOB, CP.         PAST MEDICAL & SURGICAL HISTORY:  HTN (hypertension)  HLD (hyperlipidemia)  DM (diabetes mellitus)  Hypothyroidism  TIA (transient ischemic attack)  S/P cholecystectomy      Allergies  No Known Allergies    Intolerances      FAMILY HISTORY:  No pertinent family history in first degree relatives      Social history: never a smoker     Review of Systems: limited, pt confused, hx dementia   CONSTITUTIONAL: No fever, chills, or fatigue  EYES: No eye pain, visual disturbances, or discharge  ENMT:  No difficulty hearing, tinnitus, vertigo; No sinus or throat pain  NECK: No pain or stiffness  RESPIRATORY: Per above  CARDIOVASCULAR: No chest pain, palpitations, dizziness, or leg swelling  GASTROINTESTINAL: No abdominal or epigastric pain. No nausea, vomiting, or hematemesis; No diarrhea or constipation. No melena or hematochezia.  GENITOURINARY: No dysuria, frequency, hematuria, or incontinence  NEUROLOGICAL: No headaches, memory loss, loss of strength, numbness, or tremors  SKIN: No itching, burning, rashes, or lesions   MUSCULOSKELETAL: No joint pain or swelling; No muscle, back, or extremity pain  PSYCHIATRIC: No depression, anxiety, mood swings, or difficulty sleeping      Medications:  MEDICATIONS  (STANDING):  brimonidine 0.2% Ophthalmic Solution 1 Drop(s) Both EYES two times a day  chlorhexidine 4% Liquid 1 Application(s) Topical <User Schedule>  dextrose 5%. 1000 milliLiter(s) (100 mL/Hr) IV Continuous <Continuous>  dextrose 5%. 1000 milliLiter(s) (50 mL/Hr) IV Continuous <Continuous>  dextrose 50% Injectable 25 Gram(s) IV Push once  dextrose 50% Injectable 12.5 Gram(s) IV Push once  dextrose 50% Injectable 25 Gram(s) IV Push once  enoxaparin Injectable 80 milliGRAM(s) SubCutaneous every 12 hours  furosemide   Injectable 40 milliGRAM(s) IV Push daily  glucagon  Injectable 1 milliGRAM(s) IntraMuscular once  insulin glargine Injectable (LANTUS) 5 Unit(s) SubCutaneous every morning  insulin lispro (ADMELOG) corrective regimen sliding scale   SubCutaneous every 6 hours  latanoprost 0.005% Ophthalmic Solution 1 Drop(s) Both EYES at bedtime  levothyroxine Injectable 44 MICROGram(s) IV Push at bedtime  piperacillin/tazobactam IVPB.. 3.375 Gram(s) IV Intermittent every 8 hours  remdesivir  IVPB 100 milliGRAM(s) IV Intermittent every 24 hours  remdesivir  IVPB   IV Intermittent   vancomycin  IVPB 1000 milliGRAM(s) IV Intermittent every 24 hours    MEDICATIONS  (PRN):  dextrose Oral Gel 15 Gram(s) Oral once PRN Blood Glucose LESS THAN 70 milliGRAM(s)/deciliter            Vital Signs Last 24 Hrs  T(C): 36.6 (18 May 2022 07:46), Max: 36.8 (17 May 2022 22:31)  T(F): 97.8 (18 May 2022 07:46), Max: 98.3 (17 May 2022 22:31)  HR: 79 (18 May 2022 09:34) (73 - 86)  BP: 143/60 (18 May 2022 07:46) (105/74 - 157/93)  BP(mean): --  RR: 20 (18 May 2022 09:34) (18 - 20)  SpO2: 97% (18 May 2022 09:34) (95% - 99%)    ABG - ( 17 May 2022 00:52 )  pH, Arterial: 7.26  pH, Blood: x     /  pCO2: 56    /  pO2: 199   / HCO3: 25    / Base Excess: -2.8  /  SaO2: 99.0              VBG pH 7.27 05-17 @ 10:25  VBG pCO2 55 05-17 @ 10:25  VBG O2 sat 73.5 05-17 @ 10:25  VBG lactate 1.7 05-17 @ 10:25  VBG pH 7.21 05-16 @ 16:38  VBG pCO2 63 05-16 @ 16:38  VBG O2 sat 60.9 05-16 @ 16:38  VBG lactate 1.9 05-16 @ 16:38        05-17 @ 07:01  -  05-18 @ 07:00  --------------------------------------------------------  IN: 500 mL / OUT: 0 mL / NET: 500 mL          LABS:                        8.9    13.74 )-----------( 487      ( 18 May 2022 06:50 )             29.4     05-18    143  |  105  |  28<H>  ----------------------------<  58<L>  4.4   |  22  |  1.34<H>    Ca    9.5      18 May 2022 06:49  Phos  4.6     05-17  Mg     2.1     05-17    TPro  7.8  /  Alb  3.5  /  TBili  0.5  /  DBili  0.2  /  AST  18  /  ALT  25  /  AlkPhos  269<H>  05-18          CAPILLARY BLOOD GLUCOSE      POCT Blood Glucose.: 191 mg/dL (18 May 2022 11:53)    PT/INR - ( 18 May 2022 06:50 )   PT: 14.8 sec;   INR: 1.27 ratio             Procalcitonin, Serum: 0.24 ng/mL (05-18-22 @ 06:49)    Serum Pro-Brain Natriuretic Peptide: 5061 pg/mL (05-16-22 @ 14:26)              CULTURES:      (collected 05-16-22 @ 13:43)  Source: .Blood Blood-Peripheral  Preliminary Report (05-17-22 @ 18:01):    No growth to date.     (collected 05-16-22 @ 13:32)  Source: .Blood Blood-Peripheral  Preliminary Report (05-17-22 @ 18:01):    No growth to date.                      Physical Examination:    General: No acute distress.      HEENT: Pupils equal, reactive to light.  Symmetric.    PULM: decreased BS at bases     CVS: S1, S2    ABD: Soft, nondistended, nontender, normoactive bowel sounds, no masses    EXT: No edema, nontender    SKIN: Warm and well perfused, no rashes noted.    NEURO: A&O x1        RADIOLOGY REVIEWED  CT chest: < from: CT Angio Chest PE Protocol w/ IV Cont (05.16.22 @ 18:38) >  FINDINGS:    LUNGS AND AIRWAYS: Bilateral lower lung areas of compressive atelectasis   with complete involvement of the bilateral lower lobes. Left upper lobe   areas of atelectasis with involvement of the lingula.  PLEURA: New moderate bilateral pleural effusions.  MEDIASTINUM AND GLENNY: No lymphadenopathy. Small hiatal hernia.  VESSELS: This study is limited for evaluation of the subsegmental or   smaller pulmonary arterial branches due to superimposed respiratory   motion artifact. No pulmonary arterial emboli are identified within the   well visualized pulmonary arteries. Atherosclerotic changes of the aorta   and coronary arteries. Main pulmonary artery appears enlarged which can   be seen in the setting of pulmonary arterial hypertension.  HEART: Heart size is enlarged. No pericardial effusion. Mitral annular   calcifications. Biatrial enlargement.  CHEST WALL AND LOWER NECK: Within normal limits. Subcutaneous edema.  VISUALIZED UPPER ABDOMEN: Left renalcyst.  BONES: Degenerative changes.    IMPRESSION:  Limited study for evaluation of the subsegmental or smaller pulmonary   arterial branches. No pulmonary arterial emboli within the well   visualized pulmonary arteries.    New moderate bilateral pleural effusions with bilateral lower lung areas   of atelectasis, left greater than right, including complete compressive   atelectasis of the bilateral lower lobes.    < end of copied text >

## 2022-05-18 NOTE — CONSULT NOTE ADULT - PROBLEM SELECTOR RECOMMENDATION 2
CTA chest with b/l pl effusions with compressive atelectasis   -Keep O>I as tolerated
started on remdesivir  on HFNC  titrate supplemental O2 as needed  pulm consulted

## 2022-05-18 NOTE — PROGRESS NOTE ADULT - ASSESSMENT
89yo F w/ PMHx of DM, afib (not on AC), HTN, CVA (residual left sided weakness), hypothyroidism presents with acute hypoxic respiratory failure in setting of covid positive    Problem/Plan - 1:  ·  Problem: Acute respiratory failure with hypoxia and hypercapnia.   ·  Plan: -in setting of COVID positive,      CT : New moderate bilateral pleural effusions with bilateral lower lung areas   of atelectasis, left greater than right, including complete compressive   atelectasis of the bilateral lower lobes.  discussed with pul : pt does not have COVID pna : can stop decadron  however will cont with remdesivir ..  reviewed - d-dimer vlaues from prvious admission : always had been high : will change  lovenox to ppx.   BIPAP per pul    hypoexia  likley sec to pleural effusion/atelectasis   - cont  lasix 40mg daily  -obtain echo complete      Problem/Plan - 2:  ·  Problem: Hypothyroidism.   -c/w home synthroid (convert to IV form).    Problem/Plan - 3:  ·  Problem: Chronic atrial fibrillation.   ·  Plan: -not on treatment (AC or rate/rhythm control) per outside nursing home documents   -monitor on telemetry.  check Echo and fu with cardio     Problem/Plan - 4:  ·  Problem: Hypertension.   ·  Plan: -monitor BP       Problem/Plan - 5:  ·  Problem: DM (diabetes mellitus).   ·  Plan: -insulin sliding scale (NPO scale)  -c/w home glargine 5U.    Problem/Plan - 6:  ·  Problem: Need for prophylactic measure.     DNR/DNI - MOLST in chart.

## 2022-05-18 NOTE — CONSULT NOTE ADULT - ASSESSMENT
89 y/o F with PMH of DM, afib (not on AC), HTN, CVA (residual left sided weakness), hypothyroidism. Presents from NH with SOB, hypoxia. ABG with respiratory acidosis. Found to be COVID positive COVID positive on 5/16 while at NH. CTA chest neg PE, b/l pl effusion with compressive atelectasis.

## 2022-05-18 NOTE — CONSULT NOTE ADULT - PROBLEM SELECTOR RECOMMENDATION 3
+COVID PCR  -Continue Remdesivir x 5 days (monitor creatine, LFTs)   -D/c Decadron, no COVID PNA on CTA chest  -F/u LE duplex  -DVT ppx  -Trend inflammatory markers
stable for now  restart home meds when able to tolerate PO    - amlodipine 10mg PO daily  continue to monitor

## 2022-05-18 NOTE — CONSULT NOTE ADULT - PROBLEM SELECTOR RECOMMENDATION 9
presenting with SOB and hypoxia    - CT chest - pleural effusions with atelectasis    - + COVID 19    - elevated proBNP  in ED given zosyn, lasix, dexamethasone  initially placed on BIPAP, now on HFNC  titrate supplemental O2 as needed  pulm consulted
likely 2nd to volume overload  -CTA chest with pl effusions, compressive atelectasis. No COVID PNA seen.   -C/w diuresis, keep O>I as tolerated  -Start bipap 10/5/40% qHS   -VBG in AM   -Currently on HFNC, can attempt to transition to 6LNC. Keep sats >90% with supplemental O2.

## 2022-05-18 NOTE — CONSULT NOTE ADULT - SUBJECTIVE AND OBJECTIVE BOX
CHIEF COMPLAINT:  Hypoxia    HISTORY OF PRESENT ILLNESS:  89yo F w/ PMHx of DM, afib (not on AC), HTN, CVA (residual left sided weakness), hypothyroidism presents with shortness of breath, pt unable to answer questions, history obtained from chart review, patient presents from nursing home (the [facility] in [place]) via EMS for shortness of breath, hypoxia and lethargy, reportedly hypoxic to the 60's on room air, placed on bipap on arrival due to hypoxia but otherwise was hemodynamically stable, afebrile, saturating well on room air, labs were significant for normocytic anemia, leukocytosis, decreased GFR, increased BNP, respiratory acidosis, COVID positive, CT chest showed pleural effusions with compressive atelectasis, MICU was consulted in the ED, patient was given Zosyn, dexamethasone, furosemide 20x1, haldol 5mg x1, admitted to medicine telemetry for further management.     PAST MEDICAL & SURGICAL HISTORY:  HTN (hypertension)    HLD (hyperlipidemia)    DM (diabetes mellitus)    Hypothyroidism    TIA (transient ischemic attack)    S/P cholecystectomy    MEDICATIONS:  enoxaparin Injectable 30 milliGRAM(s) SubCutaneous every 24 hours  furosemide   Injectable 40 milliGRAM(s) IV Push daily    piperacillin/tazobactam IVPB.. 3.375 Gram(s) IV Intermittent every 8 hours  remdesivir  IVPB 100 milliGRAM(s) IV Intermittent every 24 hours  remdesivir  IVPB   IV Intermittent   vancomycin  IVPB 1000 milliGRAM(s) IV Intermittent every 24 hours    dextrose 50% Injectable 25 Gram(s) IV Push once  dextrose 50% Injectable 12.5 Gram(s) IV Push once  dextrose 50% Injectable 25 Gram(s) IV Push once  dextrose Oral Gel 15 Gram(s) Oral once PRN  glucagon  Injectable 1 milliGRAM(s) IntraMuscular once  insulin glargine Injectable (LANTUS) 5 Unit(s) SubCutaneous every morning  insulin lispro (ADMELOG) corrective regimen sliding scale   SubCutaneous every 6 hours  levothyroxine Injectable 44 MICROGram(s) IV Push at bedtime    brimonidine 0.2% Ophthalmic Solution 1 Drop(s) Both EYES two times a day  chlorhexidine 4% Liquid 1 Application(s) Topical <User Schedule>  dextrose 5%. 1000 milliLiter(s) IV Continuous <Continuous>  dextrose 5%. 1000 milliLiter(s) IV Continuous <Continuous>  latanoprost 0.005% Ophthalmic Solution 1 Drop(s) Both EYES at bedtime    FAMILY HISTORY:  No pertinent family history in first degree relatives    SOCIAL HISTORY:    [ ] Non-smoker  [ ] Smoker  [ ] Alcohol    Allergies    No Known Allergies    Intolerances    REVIEW OF SYSTEMS:  CONSTITUTIONAL: No fever, weight loss, + fatigue  EYES: No eye pain, visual disturbances, or discharge  ENMT:  No difficulty hearing, tinnitus, vertigo; No sinus or throat pain  NECK: No pain or stiffness  RESPIRATORY: No cough, wheezing, chills or hemoptysis; + Shortness of Breath  CARDIOVASCULAR: No chest pain, palpitations, passing out, dizziness, or leg swelling  GASTROINTESTINAL: No abdominal or epigastric pain. No nausea, vomiting, or hematemesis; No diarrhea or constipation. No melena or hematochezia.  GENITOURINARY: No dysuria, frequency, hematuria, or incontinence  NEUROLOGICAL: No headaches, memory loss, loss of strength, numbness, or tremors  SKIN: No itching, burning, rashes, or lesions   LYMPH Nodes: No enlarged glands  ENDOCRINE: No heat or cold intolerance; No hair loss  MUSCULOSKELETAL: No joint pain or swelling; No muscle, back, or extremity pain  PSYCHIATRIC: No depression, anxiety, mood swings, or difficulty sleeping  HEME/LYMPH: No easy bruising, or bleeding gums  ALLERY AND IMMUNOLOGIC: No hives or eczema	    [ ] All others negative	  [ ] Unable to obtain    PHYSICAL EXAM:  T(C): 36.6 (05-18-22 @ 07:46), Max: 36.8 (05-17-22 @ 22:31)  HR: 80 (05-18-22 @ 15:43) (73 - 86)  BP: 143/60 (05-18-22 @ 07:46) (105/74 - 157/93)  RR: 22 (05-18-22 @ 15:43) (18 - 22)  SpO2: 98% (05-18-22 @ 15:43) (95% - 99%)  Wt(kg): --  I&O's Summary    17 May 2022 07:01  -  18 May 2022 07:00  --------------------------------------------------------  IN: 500 mL / OUT: 0 mL / NET: 500 mL    Appearance: NAD	  HEENT: dry oral mucosa, PERRL, EOMI	  Lymphatic: No lymphadenopathy  Cardiovascular: Irregular S1 S2, No JVD, No murmurs, No edema  Respiratory: Decreased BS, HFNC 40L 40%  Psychiatry: A & O x 1-2, Mood & affect appropriate  Gastrointestinal:  Soft, Non-tender, + BS	  Skin: No rashes, No ecchymoses, No cyanosis	  Neurologic: Non-focal  Extremities: Normal range of motion, No clubbing, cyanosis or edema  Vascular: Peripheral pulses palpable 2+ bilaterally    TELEMETRY: 	    ECG:  Afib - no acute ischemic changes  RADIOLOGY: < from: CT Angio Chest PE Protocol w/ IV Cont (05.16.22 @ 18:38) >    ACC: [id number] EXAM:  CT ANGIO CHEST PULM ART [facility]                          PROCEDURE DATE:  05/16/2022      INTERPRETATION:  CLINICAL INFORMATION: Shortness of breath    COMPARISON: CT angiography of the chest 2/10/2022    CONTRAST/COMPLICATIONS:  IV Contrast: Omnipaque 350  90 cc administered   0 cc discarded  Oral Contrast: NONE  Complications: None reported at time of study completion    PROCEDURE:  CT Angiography of the Chest.  Sagittal and coronal reformats were performed as well as 3D (MIP)   reconstructions.    FINDINGS:    LUNGS AND AIRWAYS: Bilateral lower lung areas of compressive atelectasis   with complete involvement of the bilateral lower lobes. Left upper lobe   areas of atelectasis with involvement of the lingula.  PLEURA: New moderate bilateral pleural effusions.  MEDIASTINUM AND GLENNY: No lymphadenopathy. Small hiatal hernia.  VESSELS: This study is limited for evaluation of the subsegmental or   smaller pulmonary arterial branches due to superimposed respiratory   motion artifact. No pulmonary arterial emboli are identified within the   well visualized pulmonary arteries. Atherosclerotic changes of the aorta   and coronary arteries. Main pulmonary artery appears enlarged which can   be seen in the setting of pulmonary arterial hypertension.  HEART: Heart size is enlarged. No pericardial effusion. Mitral annular   calcifications. Biatrial enlargement.  CHEST WALL AND LOWER NECK: Within normal limits. Subcutaneous edema.  VISUALIZED UPPER ABDOMEN: Left renalcyst.  BONES: Degenerative changes.    IMPRESSION:  Limited study for evaluation of the subsegmental or smaller pulmonary   arterial branches. No pulmonary arterial emboli within the well   visualized pulmonary arteries.    New moderate bilateral pleural effusions with bilateral lower lung areas   of atelectasis, left greater than right, including complete compressive   atelectasis of the bilateral lower lobes.    --- End of Report ---    < end of copied text >  < from: Xray Chest 1 View- PORTABLE-Urgent (05.16.22 @ 14:34) >    ACC: [id number] EXAM:  XR CHEST PORTABLE URGENT 1V                          PROCEDURE DATE:  05/16/2022      INTERPRETATION:  EXAMINATION: XR CHEST URGENT    CLINICAL INDICATION: Sepsis    TECHNIQUE: Single frontal, portable view of the chest wasobtained.    COMPARISON: Chest x-ray 5/16/2022 1:37 PM.    FINDINGS:  Heart size cannot be assessed in this projection. Aortic calcifications.  Left basilar/retrocardiac opacity which may represent pneumonia versus   atelectasis.  Right pleural effusion and associated atelectasis.  No pneumothorax.  There are no acute osseous abnormalities.    IMPRESSION:  Left basilar/retrocardiac opacity representing pneumonia versus   atelectasis in the appropriate setting.    Right pleural effusion and associated atelectasis.    --- End of Report ---    < end of copied text >  < from: Xray Chest 1 View-PORTABLE IMMEDIATE (Xray Chest 1 View-PORTABLE IMMEDIATE .) (05.16.22 @ 13:38) >    ACC: [id number] EXAM:  XR CHEST PORTABLE IMMED 1V                          PROCEDURE DATE:  05/16/2022      INTERPRETATION:  EXAMINATION: XR CHEST IMMEDIATE    CLINICAL INDICATION: Shortness of breath    TECHNIQUE: Single frontal, portable view of the chest was obtained. Chin   obscures superior mediastinum and parts of the apices.    COMPARISON: CTA of the chest from February 10, 2022.    FINDINGS:  Heart size and the mediastinum cannot be accurately evaluated on this   projection. Calcifiedthoracic aorta.  New opacities projecting over the lower half of the right hemithorax and   lower third of the left hemithorax with poor definition of the   hemidiaphragms.  No pneumothorax over parts of the apices are obscured.  There is osteoarthritic degenerative change of the spine.    IMPRESSION:  New opacities projecting over the lower half of the right hemithorax and   lower third of the left hemithorax, possibly a small to moderate right   and small left pleural effusions with associated passive atelectasis.   Atelectasis of other cause and/or other underlying pathology including,   but not limited to, pneumonia is not excluded.    --- End of Report ---      < end of copied text >    OTHER: 	  	  LABS:	 	    CARDIAC MARKERS: Troponin T, High Sensitivity Result: 60: Specimen not hemolyzed Troponin T, High Sensitivity Result: 54: Specimen not hemolyzed                        8.9    13.74 )-----------( 487      ( 18 May 2022 06:50 )             29.4     05-18    143  |  105  |  28<H>  ----------------------------<  58<L>  4.4   |  22  |  1.34<H>    Ca    9.5      18 May 2022 06:49  Phos  4.6     05-17  Mg     2.1     05-17    TPro  7.8  /  Alb  3.5  /  TBili  0.5  /  DBili  0.2  /  AST  18  /  ALT  25  /  AlkPhos  269<H>  05-18    proBNP: Serum Pro-Brain Natriuretic Peptide: 5061 pg/mL (05.16.22 @ 14:26)   Lipid Profile:   HgA1c: A1C with Estimated Average Glucose Result: 8.9: Method: Immunoassay   TSH: Thyroid Stimulating Hormone, Serum: 1.15 uIU/mL (05.17.22 @ 07:07)

## 2022-05-18 NOTE — CONSULT NOTE ADULT - PROBLEM SELECTOR RECOMMENDATION 4
by hx  -Per primary team,
rate controlled  EKG reviewed - Afib  not on AC due to history of petechial hemorrhages with CVA  monitor on Tele

## 2022-05-18 NOTE — CONSULT NOTE ADULT - ASSESSMENT
89yo F w/ PMHx of DM, afib (not on AC), HTN, CVA (residual left sided weakness), hypothyroidism presents with shortness of breath.

## 2022-05-18 NOTE — CHART NOTE - NSCHARTNOTEFT_GEN_A_CORE
Started on Lovenox 1 mg/kg BID for elevated d dimer with hypoxia and covid, as per Dr zamora.  Theresa Rutledge NP  [phone]

## 2022-05-19 LAB
ALBUMIN SERPL ELPH-MCNC: 3.3 G/DL — SIGNIFICANT CHANGE UP (ref 3.3–5)
ALP SERPL-CCNC: 274 U/L — HIGH (ref 40–120)
ALT FLD-CCNC: 22 U/L — SIGNIFICANT CHANGE UP (ref 10–45)
ANION GAP SERPL CALC-SCNC: 13 MMOL/L — SIGNIFICANT CHANGE UP (ref 5–17)
AST SERPL-CCNC: 19 U/L — SIGNIFICANT CHANGE UP (ref 10–40)
BASE EXCESS BLDV CALC-SCNC: 4.7 MMOL/L — HIGH (ref -2–2)
BILIRUB SERPL-MCNC: 0.5 MG/DL — SIGNIFICANT CHANGE UP (ref 0.2–1.2)
BUN SERPL-MCNC: 27 MG/DL — HIGH (ref 7–23)
CALCIUM SERPL-MCNC: 9.1 MG/DL — SIGNIFICANT CHANGE UP (ref 8.4–10.5)
CHLORIDE SERPL-SCNC: 100 MMOL/L — SIGNIFICANT CHANGE UP (ref 96–108)
CO2 BLDV-SCNC: 34 MMOL/L — HIGH (ref 22–26)
CO2 SERPL-SCNC: 24 MMOL/L — SIGNIFICANT CHANGE UP (ref 22–31)
CREAT SERPL-MCNC: 1.2 MG/DL — SIGNIFICANT CHANGE UP (ref 0.5–1.3)
EGFR: 42 ML/MIN/1.73M2 — LOW
GAS PNL BLDV: SIGNIFICANT CHANGE UP
GLUCOSE BLDC GLUCOMTR-MCNC: 109 MG/DL — HIGH (ref 70–99)
GLUCOSE BLDC GLUCOMTR-MCNC: 147 MG/DL — HIGH (ref 70–99)
GLUCOSE BLDC GLUCOMTR-MCNC: 157 MG/DL — HIGH (ref 70–99)
GLUCOSE BLDC GLUCOMTR-MCNC: 202 MG/DL — HIGH (ref 70–99)
GLUCOSE SERPL-MCNC: 136 MG/DL — HIGH (ref 70–99)
HCO3 BLDV-SCNC: 32 MMOL/L — HIGH (ref 22–29)
INR BLD: 1.47 RATIO — HIGH (ref 0.88–1.16)
PCO2 BLDV: 58 MMHG — HIGH (ref 39–42)
PH BLDV: 7.35 — SIGNIFICANT CHANGE UP (ref 7.32–7.43)
PO2 BLDV: 27 MMHG — SIGNIFICANT CHANGE UP (ref 25–45)
POTASSIUM SERPL-MCNC: 3.8 MMOL/L — SIGNIFICANT CHANGE UP (ref 3.5–5.3)
POTASSIUM SERPL-SCNC: 3.8 MMOL/L — SIGNIFICANT CHANGE UP (ref 3.5–5.3)
PROT SERPL-MCNC: 8 G/DL — SIGNIFICANT CHANGE UP (ref 6–8.3)
PROTHROM AB SERPL-ACNC: 17 SEC — HIGH (ref 10.5–13.4)
SAO2 % BLDV: 43.3 % — LOW (ref 67–88)
SODIUM SERPL-SCNC: 137 MMOL/L — SIGNIFICANT CHANGE UP (ref 135–145)

## 2022-05-19 RX ORDER — ENOXAPARIN SODIUM 100 MG/ML
30 INJECTION SUBCUTANEOUS EVERY 12 HOURS
Refills: 0 | Status: DISCONTINUED | OUTPATIENT
Start: 2022-05-19 | End: 2022-05-21

## 2022-05-19 RX ADMIN — PIPERACILLIN AND TAZOBACTAM 25 GRAM(S): 4; .5 INJECTION, POWDER, LYOPHILIZED, FOR SOLUTION INTRAVENOUS at 03:52

## 2022-05-19 RX ADMIN — ENOXAPARIN SODIUM 30 MILLIGRAM(S): 100 INJECTION SUBCUTANEOUS at 18:35

## 2022-05-19 RX ADMIN — REMDESIVIR 500 MILLIGRAM(S): 5 INJECTION INTRAVENOUS at 12:12

## 2022-05-19 RX ADMIN — CHLORHEXIDINE GLUCONATE 1 APPLICATION(S): 213 SOLUTION TOPICAL at 16:49

## 2022-05-19 RX ADMIN — Medication 44 MICROGRAM(S): at 21:44

## 2022-05-19 RX ADMIN — LATANOPROST 1 DROP(S): 0.05 SOLUTION/ DROPS OPHTHALMIC; TOPICAL at 21:44

## 2022-05-19 RX ADMIN — Medication 1: at 17:32

## 2022-05-19 RX ADMIN — Medication 2: at 00:47

## 2022-05-19 RX ADMIN — INSULIN GLARGINE 5 UNIT(S): 100 INJECTION, SOLUTION SUBCUTANEOUS at 08:37

## 2022-05-19 RX ADMIN — Medication 40 MILLIGRAM(S): at 06:42

## 2022-05-19 RX ADMIN — BRIMONIDINE TARTRATE 1 DROP(S): 2 SOLUTION/ DROPS OPHTHALMIC at 06:43

## 2022-05-19 RX ADMIN — BRIMONIDINE TARTRATE 1 DROP(S): 2 SOLUTION/ DROPS OPHTHALMIC at 18:33

## 2022-05-19 RX ADMIN — Medication 250 MILLIGRAM(S): at 08:51

## 2022-05-19 NOTE — PROGRESS NOTE ADULT - PROBLEM SELECTOR PLAN 1
presenting with SOB and hypoxia    - CT chest - pleural effusions with atelectasis    - + COVID 19    - elevated proBNP  in ED given zosyn, lasix, dexamethasone  initially placed on BIPAP, now on HFNC  titrate supplemental O2 as needed  pulm consulted

## 2022-05-19 NOTE — PROGRESS NOTE ADULT - PROBLEM SELECTOR PLAN 3
+COVID PCR  -Continue Remdesivir x 5 days (monitor creatine, LFTs)   -No indication for decadron, no COVID PNA on CTA chest  -F/u LE duplex  -DVT ppx  -Trend inflammatory markers.

## 2022-05-19 NOTE — PROGRESS NOTE ADULT - ASSESSMENT
89yo F w/ PMHx of DM, afib (not on AC), HTN, CVA (residual left sided weakness), hypothyroidism presents with acute hypoxic respiratory failure in setting of covid positive    Problem/Plan - 1:  ·  Problem: Acute respiratory failure with hypoxia and hypercapnia.   ·  Plan: -in setting of COVID positive,   CT : New moderate bilateral pleural effusions with bilateral lower lung areas   of atelectasis, left greater than right, including complete compressive   atelectasis of the bilateral lower lobes.  discussed with pul : pt does not have COVID pna : can stop decadron  however will cont with remdesivir ..  reviewed - d-dimer vlaues from prvious admission : always had been high : will change  lovenox to ppx.   BIPAP per pul    hypoxia  likley sec to pleural effusion/atelectasis   - cont  lasix 40mg daily  - echo complete      Problem/Plan - 2:  ·  Problem: Hypothyroidism.   -c/w home synthroid (convert to IV form).    Problem/Plan - 3:  ·  Problem: Chronic atrial fibrillation.   ·  Plan: -not on treatment (AC or rate/rhythm control) per outside nursing home documents   -monitor on telemetry.      Problem/Plan - 4:  ·  Problem: Hypertension.   ·  Plan: -monitor BP       Problem/Plan - 5:  ·  Problem: DM (diabetes mellitus).   ·  Plan: -insulin sliding scale (NPO scale)  -c/w home glargine 5U.    Problem/Plan - 6:  ·  Problem: Need for prophylactic measure.     DNR/DNI - MOLST in chart.

## 2022-05-19 NOTE — PROGRESS NOTE ADULT - PROBLEM SELECTOR PLAN 3
stable  restart home meds when able to tolerate PO    - amlodipine 10mg PO daily  continue to monitor

## 2022-05-19 NOTE — PROGRESS NOTE ADULT - ASSESSMENT
89 y/o F with PMH of DM, afib (not on AC), HTN, CVA (residual left sided weakness), hypothyroidism. Presents from NH with SOB, hypoxia. ABG with respiratory acidosis. Found to be COVID positive COVID positive on 5/16 while at NH. CTA chest neg PE, b/l pl effusion with compressive atelectasis.

## 2022-05-19 NOTE — PROGRESS NOTE ADULT - PROBLEM SELECTOR PLAN 1
likely 2nd to volume overload  -CTA chest with pl effusions, compressive atelectasis. No COVID PNA seen.   -C/w diuresis, keep O>I as tolerated  -Bipap 10/5/40% qHS (pt refused last night)  -VBG noted   -D/c HFNC, placed on 6LNC. Continue to wean O2 as tolerated, keep sats >90%

## 2022-05-20 LAB
ALBUMIN SERPL ELPH-MCNC: 3.4 G/DL — SIGNIFICANT CHANGE UP (ref 3.3–5)
ALP SERPL-CCNC: 243 U/L — HIGH (ref 40–120)
ALT FLD-CCNC: 22 U/L — SIGNIFICANT CHANGE UP (ref 10–45)
ANION GAP SERPL CALC-SCNC: 13 MMOL/L — SIGNIFICANT CHANGE UP (ref 5–17)
AST SERPL-CCNC: 22 U/L — SIGNIFICANT CHANGE UP (ref 10–40)
BILIRUB DIRECT SERPL-MCNC: 0.1 MG/DL — SIGNIFICANT CHANGE UP (ref 0–0.3)
BILIRUB INDIRECT FLD-MCNC: 0.3 MG/DL — SIGNIFICANT CHANGE UP (ref 0.2–1)
BILIRUB SERPL-MCNC: 0.4 MG/DL — SIGNIFICANT CHANGE UP (ref 0.2–1.2)
BUN SERPL-MCNC: 25 MG/DL — HIGH (ref 7–23)
CALCIUM SERPL-MCNC: 9.3 MG/DL — SIGNIFICANT CHANGE UP (ref 8.4–10.5)
CHLORIDE SERPL-SCNC: 101 MMOL/L — SIGNIFICANT CHANGE UP (ref 96–108)
CO2 SERPL-SCNC: 29 MMOL/L — SIGNIFICANT CHANGE UP (ref 22–31)
CREAT SERPL-MCNC: 1.21 MG/DL — SIGNIFICANT CHANGE UP (ref 0.5–1.3)
CREAT SERPL-MCNC: 1.26 MG/DL — SIGNIFICANT CHANGE UP (ref 0.5–1.3)
D DIMER BLD IA.RAPID-MCNC: 590 NG/ML DDU — HIGH
EGFR: 40 ML/MIN/1.73M2 — LOW
EGFR: 42 ML/MIN/1.73M2 — LOW
GLUCOSE BLDC GLUCOMTR-MCNC: 107 MG/DL — HIGH (ref 70–99)
GLUCOSE BLDC GLUCOMTR-MCNC: 139 MG/DL — HIGH (ref 70–99)
GLUCOSE BLDC GLUCOMTR-MCNC: 140 MG/DL — HIGH (ref 70–99)
GLUCOSE BLDC GLUCOMTR-MCNC: 151 MG/DL — HIGH (ref 70–99)
GLUCOSE BLDC GLUCOMTR-MCNC: 197 MG/DL — HIGH (ref 70–99)
GLUCOSE SERPL-MCNC: 107 MG/DL — HIGH (ref 70–99)
HCT VFR BLD CALC: 31.3 % — LOW (ref 34.5–45)
HGB BLD-MCNC: 9.5 G/DL — LOW (ref 11.5–15.5)
INR BLD: 1.42 RATIO — HIGH (ref 0.88–1.16)
MCHC RBC-ENTMCNC: 25.5 PG — LOW (ref 27–34)
MCHC RBC-ENTMCNC: 30.4 GM/DL — LOW (ref 32–36)
MCV RBC AUTO: 83.9 FL — SIGNIFICANT CHANGE UP (ref 80–100)
NRBC # BLD: 0 /100 WBCS — SIGNIFICANT CHANGE UP (ref 0–0)
PLATELET # BLD AUTO: 468 K/UL — HIGH (ref 150–400)
POTASSIUM SERPL-MCNC: 3.9 MMOL/L — SIGNIFICANT CHANGE UP (ref 3.5–5.3)
POTASSIUM SERPL-SCNC: 3.9 MMOL/L — SIGNIFICANT CHANGE UP (ref 3.5–5.3)
PROT SERPL-MCNC: 7.7 G/DL — SIGNIFICANT CHANGE UP (ref 6–8.3)
PROTHROM AB SERPL-ACNC: 16.4 SEC — HIGH (ref 10.5–13.4)
RBC # BLD: 3.73 M/UL — LOW (ref 3.8–5.2)
RBC # FLD: 15.9 % — HIGH (ref 10.3–14.5)
SODIUM SERPL-SCNC: 143 MMOL/L — SIGNIFICANT CHANGE UP (ref 135–145)
WBC # BLD: 8.15 K/UL — SIGNIFICANT CHANGE UP (ref 3.8–10.5)
WBC # FLD AUTO: 8.15 K/UL — SIGNIFICANT CHANGE UP (ref 3.8–10.5)

## 2022-05-20 PROCEDURE — 93970 EXTREMITY STUDY: CPT | Mod: 26

## 2022-05-20 PROCEDURE — 71045 X-RAY EXAM CHEST 1 VIEW: CPT | Mod: 26

## 2022-05-20 RX ADMIN — ENOXAPARIN SODIUM 30 MILLIGRAM(S): 100 INJECTION SUBCUTANEOUS at 05:20

## 2022-05-20 RX ADMIN — LATANOPROST 1 DROP(S): 0.05 SOLUTION/ DROPS OPHTHALMIC; TOPICAL at 22:15

## 2022-05-20 RX ADMIN — INSULIN GLARGINE 5 UNIT(S): 100 INJECTION, SOLUTION SUBCUTANEOUS at 08:02

## 2022-05-20 RX ADMIN — Medication 1: at 01:17

## 2022-05-20 RX ADMIN — Medication 1: at 12:13

## 2022-05-20 RX ADMIN — REMDESIVIR 500 MILLIGRAM(S): 5 INJECTION INTRAVENOUS at 12:13

## 2022-05-20 RX ADMIN — Medication 40 MILLIGRAM(S): at 05:20

## 2022-05-20 RX ADMIN — BRIMONIDINE TARTRATE 1 DROP(S): 2 SOLUTION/ DROPS OPHTHALMIC at 17:28

## 2022-05-20 RX ADMIN — ENOXAPARIN SODIUM 30 MILLIGRAM(S): 100 INJECTION SUBCUTANEOUS at 17:28

## 2022-05-20 RX ADMIN — CHLORHEXIDINE GLUCONATE 1 APPLICATION(S): 213 SOLUTION TOPICAL at 08:03

## 2022-05-20 RX ADMIN — BRIMONIDINE TARTRATE 1 DROP(S): 2 SOLUTION/ DROPS OPHTHALMIC at 05:19

## 2022-05-20 NOTE — PROGRESS NOTE ADULT - PROBLEM SELECTOR PLAN 1
likely 2nd to volume overload  -CTA chest with pl effusions, compressive atelectasis. No COVID PNA seen.   -C/w diuresis, keep O>I as tolerated  -Pt refused bipap past 2 nights, d/c'd  -VBG 5/19 noted   -No longer requiring HFNC, breathing comfortably on nasal cannula  -O2 decreased to 2LNC, continue to wean O2 as tolerated, keep sats >90%

## 2022-05-20 NOTE — PROGRESS NOTE ADULT - PROBLEM SELECTOR PLAN 3
+COVID PCR  -Continue Remdesivir x 5 days (monitor creatine, LFTs)   -No indication for decadron, no COVID PNA on CTA chest  -F/u LE duplex  -DVT ppx  -Trend inflammatory markers. Ddimer added to AM labs.

## 2022-05-20 NOTE — PROGRESS NOTE ADULT - ASSESSMENT
89yo F w/ PMHx of DM, afib (not on AC), HTN, CVA (residual left sided weakness), hypothyroidism presents with acute hypoxic respiratory failure in setting of covid positive    Problem/Plan - 1:  ·  Problem: Acute respiratory failure with hypoxia and hypercapnia.   ·  Plan: -in setting of COVID positive,   CT : New moderate bilateral pleural effusions with bilateral lower lung areas   of atelectasis, left greater than right, including complete compressive   atelectasis of the bilateral lower lobes.  discussed with pul : pt does not have COVID pna : can stop decadron  however will cont with remdesivir ..  reviewed - d-dimer vlaues from prvious admission : always had been high : will change  lovenox to ppx.   BIPAP per pul    hypoxia  likley sec to pleural effusion/atelectasis   - cont  lasix 40mg daily: fu cxr and will change to po in the next 24 hrs if cont to improve       Problem/Plan - 2:  ·  Problem: Hypothyroidism.   -c/w home synthroid (convert to IV form).    Problem/Plan - 3:  ·  Problem: Chronic atrial fibrillation.   ·  Plan: -not on treatment (AC or rate/rhythm control) per outside nursing home documents   -monitor on telemetry.      Problem/Plan - 4:  ·  Problem: Hypertension.   ·  Plan: -monitor BP       Problem/Plan - 5:  ·  Problem: DM (diabetes mellitus).   ·  Plan: -insulin sliding scale (NPO scale)  -c/w home glargine 5U.    Problem/Plan - 6:  ·  Problem: Need for prophylactic measure.     DNR/DNI - MOLST in chart.

## 2022-05-20 NOTE — PROGRESS NOTE ADULT - PROBLEM SELECTOR PLAN 1
presenting with SOB and hypoxia    - CT chest - pleural effusions with atelectasis    - + COVID 19    - elevated proBNP  in ED given zosyn, lasix, dexamethasone  initially placed on BIPAP, now on NC   titrate supplemental O2 as needed  pulm consulted

## 2022-05-21 LAB
ALBUMIN SERPL ELPH-MCNC: 3.4 G/DL — SIGNIFICANT CHANGE UP (ref 3.3–5)
ALP SERPL-CCNC: 242 U/L — HIGH (ref 40–120)
ALT FLD-CCNC: 19 U/L — SIGNIFICANT CHANGE UP (ref 10–45)
AST SERPL-CCNC: 16 U/L — SIGNIFICANT CHANGE UP (ref 10–40)
BILIRUB DIRECT SERPL-MCNC: 0.1 MG/DL — SIGNIFICANT CHANGE UP (ref 0–0.3)
BILIRUB INDIRECT FLD-MCNC: 0.4 MG/DL — SIGNIFICANT CHANGE UP (ref 0.2–1)
BILIRUB SERPL-MCNC: 0.5 MG/DL — SIGNIFICANT CHANGE UP (ref 0.2–1.2)
BUN SERPL-MCNC: 21 MG/DL — SIGNIFICANT CHANGE UP (ref 7–23)
CALCIUM SERPL-MCNC: 9.4 MG/DL — SIGNIFICANT CHANGE UP (ref 8.4–10.5)
CHLORIDE SERPL-SCNC: 100 MMOL/L — SIGNIFICANT CHANGE UP (ref 96–108)
CO2 SERPL-SCNC: 31 MMOL/L — SIGNIFICANT CHANGE UP (ref 22–31)
CREAT SERPL-MCNC: 0.98 MG/DL — SIGNIFICANT CHANGE UP (ref 0.5–1.3)
CREAT SERPL-MCNC: 0.98 MG/DL — SIGNIFICANT CHANGE UP (ref 0.5–1.3)
CULTURE RESULTS: SIGNIFICANT CHANGE UP
CULTURE RESULTS: SIGNIFICANT CHANGE UP
EGFR: 54 ML/MIN/1.73M2 — LOW
EGFR: 54 ML/MIN/1.73M2 — LOW
GLUCOSE BLDC GLUCOMTR-MCNC: 121 MG/DL — HIGH (ref 70–99)
GLUCOSE BLDC GLUCOMTR-MCNC: 131 MG/DL — HIGH (ref 70–99)
GLUCOSE BLDC GLUCOMTR-MCNC: 149 MG/DL — HIGH (ref 70–99)
GLUCOSE BLDC GLUCOMTR-MCNC: 201 MG/DL — HIGH (ref 70–99)
GLUCOSE SERPL-MCNC: 104 MG/DL — HIGH (ref 70–99)
HCT VFR BLD CALC: 33.4 % — LOW (ref 34.5–45)
HGB BLD-MCNC: 10.2 G/DL — LOW (ref 11.5–15.5)
INR BLD: 1.46 RATIO — HIGH (ref 0.88–1.16)
MCHC RBC-ENTMCNC: 25.6 PG — LOW (ref 27–34)
MCHC RBC-ENTMCNC: 30.5 GM/DL — LOW (ref 32–36)
MCV RBC AUTO: 83.9 FL — SIGNIFICANT CHANGE UP (ref 80–100)
NRBC # BLD: 0 /100 WBCS — SIGNIFICANT CHANGE UP (ref 0–0)
PLATELET # BLD AUTO: 453 K/UL — HIGH (ref 150–400)
POTASSIUM SERPL-MCNC: 3.7 MMOL/L — SIGNIFICANT CHANGE UP (ref 3.5–5.3)
POTASSIUM SERPL-SCNC: 3.7 MMOL/L — SIGNIFICANT CHANGE UP (ref 3.5–5.3)
PROT SERPL-MCNC: 7.9 G/DL — SIGNIFICANT CHANGE UP (ref 6–8.3)
PROTHROM AB SERPL-ACNC: 16.9 SEC — HIGH (ref 10.5–13.4)
RBC # BLD: 3.98 M/UL — SIGNIFICANT CHANGE UP (ref 3.8–5.2)
RBC # FLD: 15.5 % — HIGH (ref 10.3–14.5)
SODIUM SERPL-SCNC: 142 MMOL/L — SIGNIFICANT CHANGE UP (ref 135–145)
SPECIMEN SOURCE: SIGNIFICANT CHANGE UP
SPECIMEN SOURCE: SIGNIFICANT CHANGE UP
WBC # BLD: 7.68 K/UL — SIGNIFICANT CHANGE UP (ref 3.8–10.5)
WBC # FLD AUTO: 7.68 K/UL — SIGNIFICANT CHANGE UP (ref 3.8–10.5)

## 2022-05-21 RX ORDER — ENOXAPARIN SODIUM 100 MG/ML
30 INJECTION SUBCUTANEOUS EVERY 12 HOURS
Refills: 0 | Status: DISCONTINUED | OUTPATIENT
Start: 2022-05-21 | End: 2022-05-26

## 2022-05-21 RX ORDER — ENOXAPARIN SODIUM 100 MG/ML
40 INJECTION SUBCUTANEOUS EVERY 24 HOURS
Refills: 0 | Status: DISCONTINUED | OUTPATIENT
Start: 2022-05-21 | End: 2022-05-21

## 2022-05-21 RX ADMIN — LATANOPROST 1 DROP(S): 0.05 SOLUTION/ DROPS OPHTHALMIC; TOPICAL at 23:24

## 2022-05-21 RX ADMIN — BRIMONIDINE TARTRATE 1 DROP(S): 2 SOLUTION/ DROPS OPHTHALMIC at 18:07

## 2022-05-21 RX ADMIN — BRIMONIDINE TARTRATE 1 DROP(S): 2 SOLUTION/ DROPS OPHTHALMIC at 07:00

## 2022-05-21 RX ADMIN — Medication 40 MILLIGRAM(S): at 07:00

## 2022-05-21 RX ADMIN — ENOXAPARIN SODIUM 30 MILLIGRAM(S): 100 INJECTION SUBCUTANEOUS at 18:06

## 2022-05-21 RX ADMIN — INSULIN GLARGINE 5 UNIT(S): 100 INJECTION, SOLUTION SUBCUTANEOUS at 08:10

## 2022-05-21 RX ADMIN — ENOXAPARIN SODIUM 30 MILLIGRAM(S): 100 INJECTION SUBCUTANEOUS at 07:05

## 2022-05-21 RX ADMIN — CHLORHEXIDINE GLUCONATE 1 APPLICATION(S): 213 SOLUTION TOPICAL at 07:00

## 2022-05-21 RX ADMIN — REMDESIVIR 500 MILLIGRAM(S): 5 INJECTION INTRAVENOUS at 10:13

## 2022-05-21 NOTE — PROGRESS NOTE ADULT - PROBLEM SELECTOR PLAN 1
presenting with SOB and hypoxia    - CT chest - pleural effusions with atelectasis    - + COVID 19    - elevated proBNP  in ED given zosyn, lasix, dexamethasone  initially placed on BIPAP, now on NC   c/w lasix   titrate supplemental O2 as needed  pulm consulted

## 2022-05-21 NOTE — PROGRESS NOTE ADULT - ASSESSMENT
89yo F w/ PMHx of DM, afib (not on AC), HTN, CVA (residual left sided weakness), hypothyroidism presents with shortness of breath.

## 2022-05-22 LAB
ALBUMIN SERPL ELPH-MCNC: 3.2 G/DL — LOW (ref 3.3–5)
ALP SERPL-CCNC: 212 U/L — HIGH (ref 40–120)
ALT FLD-CCNC: 14 U/L — SIGNIFICANT CHANGE UP (ref 10–45)
AST SERPL-CCNC: 15 U/L — SIGNIFICANT CHANGE UP (ref 10–40)
BILIRUB DIRECT SERPL-MCNC: 0.1 MG/DL — SIGNIFICANT CHANGE UP (ref 0–0.3)
BILIRUB INDIRECT FLD-MCNC: 0.4 MG/DL — SIGNIFICANT CHANGE UP (ref 0.2–1)
BILIRUB SERPL-MCNC: 0.5 MG/DL — SIGNIFICANT CHANGE UP (ref 0.2–1.2)
CREAT SERPL-MCNC: 1.12 MG/DL — SIGNIFICANT CHANGE UP (ref 0.5–1.3)
EGFR: 46 ML/MIN/1.73M2 — LOW
GLUCOSE BLDC GLUCOMTR-MCNC: 125 MG/DL — HIGH (ref 70–99)
GLUCOSE BLDC GLUCOMTR-MCNC: 143 MG/DL — HIGH (ref 70–99)
GLUCOSE BLDC GLUCOMTR-MCNC: 170 MG/DL — HIGH (ref 70–99)
GLUCOSE BLDC GLUCOMTR-MCNC: 220 MG/DL — HIGH (ref 70–99)
GLUCOSE BLDC GLUCOMTR-MCNC: 78 MG/DL — SIGNIFICANT CHANGE UP (ref 70–99)
INR BLD: 1.58 RATIO — HIGH (ref 0.88–1.16)
PROT SERPL-MCNC: 7.4 G/DL — SIGNIFICANT CHANGE UP (ref 6–8.3)
PROTHROM AB SERPL-ACNC: 18.2 SEC — HIGH (ref 10.5–13.4)

## 2022-05-22 PROCEDURE — 71045 X-RAY EXAM CHEST 1 VIEW: CPT | Mod: 26

## 2022-05-22 RX ORDER — POTASSIUM CHLORIDE 20 MEQ
40 PACKET (EA) ORAL EVERY 4 HOURS
Refills: 0 | Status: COMPLETED | OUTPATIENT
Start: 2022-05-22 | End: 2022-05-23

## 2022-05-22 RX ORDER — POTASSIUM CHLORIDE 20 MEQ
40 PACKET (EA) ORAL ONCE
Refills: 0 | Status: COMPLETED | OUTPATIENT
Start: 2022-05-22 | End: 2022-05-22

## 2022-05-22 RX ADMIN — Medication 40 MILLIEQUIVALENT(S): at 17:11

## 2022-05-22 RX ADMIN — Medication 2: at 12:12

## 2022-05-22 RX ADMIN — Medication 40 MILLIGRAM(S): at 05:41

## 2022-05-22 RX ADMIN — Medication 44 MICROGRAM(S): at 21:04

## 2022-05-22 RX ADMIN — ENOXAPARIN SODIUM 30 MILLIGRAM(S): 100 INJECTION SUBCUTANEOUS at 05:40

## 2022-05-22 RX ADMIN — LATANOPROST 1 DROP(S): 0.05 SOLUTION/ DROPS OPHTHALMIC; TOPICAL at 22:51

## 2022-05-22 RX ADMIN — Medication 44 MICROGRAM(S): at 01:46

## 2022-05-22 RX ADMIN — BRIMONIDINE TARTRATE 1 DROP(S): 2 SOLUTION/ DROPS OPHTHALMIC at 17:12

## 2022-05-22 RX ADMIN — Medication 40 MILLIEQUIVALENT(S): at 21:05

## 2022-05-22 RX ADMIN — INSULIN GLARGINE 5 UNIT(S): 100 INJECTION, SOLUTION SUBCUTANEOUS at 08:09

## 2022-05-22 RX ADMIN — CHLORHEXIDINE GLUCONATE 1 APPLICATION(S): 213 SOLUTION TOPICAL at 06:28

## 2022-05-22 RX ADMIN — ENOXAPARIN SODIUM 30 MILLIGRAM(S): 100 INJECTION SUBCUTANEOUS at 17:11

## 2022-05-22 RX ADMIN — BRIMONIDINE TARTRATE 1 DROP(S): 2 SOLUTION/ DROPS OPHTHALMIC at 06:28

## 2022-05-22 NOTE — PROGRESS NOTE ADULT - ASSESSMENT
89 yo F w/ PMHx of DM, afib (not on AC), HTN, CVA (residual left sided weakness), hypothyroidism presents with acute hypoxic respiratory failure in setting of Covid19 positive.    Problem/Plan - 1:  ·  Problem: Acute respiratory failure with hypoxia and hypercapnia   ·  Plan: - in the setting of COVID19 positive  CT: New moderate bilateral pleural effusions with bilateral lower lung areas of atelectasis, left greater than right, including complete compressive atelectasis of the bilateral lower lobes.  Discussed with pulm: pt does not have COVID pna: stopped decadron.  However, will cont with Remdesivir ..  reviewed - d-dimer values from previous admission: always had been high: change Lovenox to ppx dosing. (BID given her weight)  Completed 5 days of Remdesivir.   CTa chest neg for PE, LE dopplers neg for DVT.   BIPAP per pulm as needed.  Hypoxia likely sec to pleural effusion/atelectasis  - cont Lasix 40 mg IV daily: CXR on 5/20: unchanged pleural effusion. will repeat another one (ordered). Her O2 much improving (6L --> 4L --> 2L NC).   will change to po in the next 24 hrs if cont to improve. cardiology follow up.   wean NC O2 as tolerates   Limited TTE on 2/2022: hyperdynamic LV  Given pleural effusion and hypoxia, on diuretics will repeat TTE to evaluate her LV.    Problem/Plan - 2:  ·  Problem: Hypothyroidism.   - c/w home synthroid (can convert to PO if pt swallowing)    Problem/Plan - 3:  ·  Problem: Chronic atrial fibrillation.   ·  Plan: - not on treatment (AC or rate/rhythm control) per outside nursing home documents   -monitor on telemetry.    Problem/Plan - 4:  ·  Problem: Hypertension.   ·  Plan: -monitor BP     Problem/Plan - 5:  ·  Problem: DM (diabetes mellitus).   ·  Plan: -insulin sliding scale (NPO scale)  -c/w home glargine 5U.    Problem/Plan - 6:  ·  Problem: Need for prophylactic measure.     DNR/DNI - MOLST in chart.

## 2022-05-22 NOTE — PROGRESS NOTE ADULT - NSPROGADDITIONALINFOA_GEN_ALL_CORE
d/w pt and Rn, d/w NP.     --- Coverage for Dr. Palencia ---  - Dr. KAYLIE Harmon ([facility])  - [phone]
d/w pt and RN, d/w NP.   d/w the granddaughter Emmanuelle at bedside.     --- Coverage for Dr. Palencia ---  - [facility]  - [phone]

## 2022-05-23 LAB
ALBUMIN SERPL ELPH-MCNC: 3.3 G/DL — SIGNIFICANT CHANGE UP (ref 3.3–5)
ALP SERPL-CCNC: 203 U/L — HIGH (ref 40–120)
ALT FLD-CCNC: 16 U/L — SIGNIFICANT CHANGE UP (ref 10–45)
ANION GAP SERPL CALC-SCNC: 9 MMOL/L — SIGNIFICANT CHANGE UP (ref 5–17)
AST SERPL-CCNC: 17 U/L — SIGNIFICANT CHANGE UP (ref 10–40)
BILIRUB DIRECT SERPL-MCNC: 0.1 MG/DL — SIGNIFICANT CHANGE UP (ref 0–0.3)
BILIRUB INDIRECT FLD-MCNC: 0.2 MG/DL — SIGNIFICANT CHANGE UP (ref 0.2–1)
BILIRUB SERPL-MCNC: 0.3 MG/DL — SIGNIFICANT CHANGE UP (ref 0.2–1.2)
BUN SERPL-MCNC: 26 MG/DL — HIGH (ref 7–23)
CALCIUM SERPL-MCNC: 9.3 MG/DL — SIGNIFICANT CHANGE UP (ref 8.4–10.5)
CHLORIDE SERPL-SCNC: 102 MMOL/L — SIGNIFICANT CHANGE UP (ref 96–108)
CO2 SERPL-SCNC: 30 MMOL/L — SIGNIFICANT CHANGE UP (ref 22–31)
CREAT SERPL-MCNC: 1.14 MG/DL — SIGNIFICANT CHANGE UP (ref 0.5–1.3)
CREAT SERPL-MCNC: 1.14 MG/DL — SIGNIFICANT CHANGE UP (ref 0.5–1.3)
EGFR: 45 ML/MIN/1.73M2 — LOW
EGFR: 45 ML/MIN/1.73M2 — LOW
GLUCOSE BLDC GLUCOMTR-MCNC: 102 MG/DL — HIGH (ref 70–99)
GLUCOSE BLDC GLUCOMTR-MCNC: 128 MG/DL — HIGH (ref 70–99)
GLUCOSE BLDC GLUCOMTR-MCNC: 129 MG/DL — HIGH (ref 70–99)
GLUCOSE BLDC GLUCOMTR-MCNC: 133 MG/DL — HIGH (ref 70–99)
GLUCOSE BLDC GLUCOMTR-MCNC: 146 MG/DL — HIGH (ref 70–99)
GLUCOSE SERPL-MCNC: 108 MG/DL — HIGH (ref 70–99)
HCT VFR BLD CALC: 35.5 % — SIGNIFICANT CHANGE UP (ref 34.5–45)
HGB BLD-MCNC: 10.6 G/DL — LOW (ref 11.5–15.5)
INR BLD: 1.38 RATIO — HIGH (ref 0.88–1.16)
MAGNESIUM SERPL-MCNC: 1.8 MG/DL — SIGNIFICANT CHANGE UP (ref 1.6–2.6)
MCHC RBC-ENTMCNC: 25.4 PG — LOW (ref 27–34)
MCHC RBC-ENTMCNC: 29.9 GM/DL — LOW (ref 32–36)
MCV RBC AUTO: 85.1 FL — SIGNIFICANT CHANGE UP (ref 80–100)
NRBC # BLD: 0 /100 WBCS — SIGNIFICANT CHANGE UP (ref 0–0)
PLATELET # BLD AUTO: 436 K/UL — HIGH (ref 150–400)
POTASSIUM SERPL-MCNC: 5.5 MMOL/L — HIGH (ref 3.5–5.3)
POTASSIUM SERPL-SCNC: 5.5 MMOL/L — HIGH (ref 3.5–5.3)
PROT SERPL-MCNC: 7.8 G/DL — SIGNIFICANT CHANGE UP (ref 6–8.3)
PROTHROM AB SERPL-ACNC: 16.1 SEC — HIGH (ref 10.5–13.4)
RBC # BLD: 4.17 M/UL — SIGNIFICANT CHANGE UP (ref 3.8–5.2)
RBC # FLD: 15.8 % — HIGH (ref 10.3–14.5)
SODIUM SERPL-SCNC: 141 MMOL/L — SIGNIFICANT CHANGE UP (ref 135–145)
WBC # BLD: 7.24 K/UL — SIGNIFICANT CHANGE UP (ref 3.8–10.5)
WBC # FLD AUTO: 7.24 K/UL — SIGNIFICANT CHANGE UP (ref 3.8–10.5)

## 2022-05-23 RX ORDER — FUROSEMIDE 40 MG
40 TABLET ORAL DAILY
Refills: 0 | Status: DISCONTINUED | OUTPATIENT
Start: 2022-05-23 | End: 2022-05-26

## 2022-05-23 RX ADMIN — Medication 40 MILLIEQUIVALENT(S): at 02:45

## 2022-05-23 RX ADMIN — Medication 44 MICROGRAM(S): at 23:07

## 2022-05-23 RX ADMIN — Medication 40 MILLIGRAM(S): at 05:07

## 2022-05-23 RX ADMIN — LATANOPROST 1 DROP(S): 0.05 SOLUTION/ DROPS OPHTHALMIC; TOPICAL at 23:08

## 2022-05-23 RX ADMIN — BRIMONIDINE TARTRATE 1 DROP(S): 2 SOLUTION/ DROPS OPHTHALMIC at 17:38

## 2022-05-23 RX ADMIN — ENOXAPARIN SODIUM 30 MILLIGRAM(S): 100 INJECTION SUBCUTANEOUS at 17:38

## 2022-05-23 RX ADMIN — ENOXAPARIN SODIUM 30 MILLIGRAM(S): 100 INJECTION SUBCUTANEOUS at 05:08

## 2022-05-23 RX ADMIN — BRIMONIDINE TARTRATE 1 DROP(S): 2 SOLUTION/ DROPS OPHTHALMIC at 06:38

## 2022-05-23 RX ADMIN — INSULIN GLARGINE 5 UNIT(S): 100 INJECTION, SOLUTION SUBCUTANEOUS at 08:14

## 2022-05-23 RX ADMIN — CHLORHEXIDINE GLUCONATE 1 APPLICATION(S): 213 SOLUTION TOPICAL at 06:39

## 2022-05-23 NOTE — PROGRESS NOTE ADULT - PROBLEM SELECTOR PLAN 3
stable  restart home meds when able to tolerate PO    - amlodipine 10mg PO daily  continue to monitor stable for now - on no meds  continue to monitor

## 2022-05-23 NOTE — PROGRESS NOTE ADULT - ASSESSMENT
89 yo F w/ PMHx of DM, afib (not on AC), HTN, CVA (residual left sided weakness), hypothyroidism presents with acute hypoxic respiratory failure in setting of Covid19 positive.    Problem/Plan - 1:  ·  Problem: Acute respiratory failure with hypoxia and hypercapnia   ·  Plan: - in the setting of COVID19 positive  CT: New moderate bilateral pleural effusions with bilateral lower lung areas of atelectasis, left greater than right, including complete compressive atelectasis of the bilateral lower lobes.  Discussed with pulm: pt does not have COVID pna: stopped decadron.  However, will cont with Remdesivir ..  reviewed - d-dimer values from previous admission: always had been high: change Lovenox to ppx dosing. (BID given her weight)  Completed 5 days of Remdesivir.   CTa chest neg for PE, LE dopplers neg for DVT.   BIPAP per pulm as needed.  Hypoxia likely sec to pleural effusion/atelectasis  - cont Lasix 40 mg IV daily: CXR on 5/20: unchanged pleural effusion. will repeat another one (ordered). Her O2 much improving (6L --> 4L --> 2L NC).   now on po lasix : will plan to dc in 24 hrs    wean NC O2 as tolerates   Limited TTE on 2/2022: hyperdynamic LV  Given pleural effusion and hypoxia, on diuretics will repeat TTE to evaluate her LV.    Problem/Plan - 2:  ·  Problem: Hypothyroidism.   - c/w home synthroid (can convert to PO if pt swallowing)    Problem/Plan - 3:  ·  Problem: Chronic atrial fibrillation.   ·  Plan: - not on treatment (AC or rate/rhythm control) per outside nursing home documents   -monitor on telemetry.    Problem/Plan - 4:  ·  Problem: Hypertension.   ·  Plan: -monitor BP     Problem/Plan - 5:  ·  Problem: DM (diabetes mellitus).   ·  Plan: -insulin sliding scale (NPO scale)  -c/w home glargine 5U.    Problem/Plan - 6:  ·  Problem: Need for prophylactic measure.     DNR/DNI - MOLST in chart.

## 2022-05-23 NOTE — PROGRESS NOTE ADULT - PROBLEM SELECTOR PLAN 1
presenting with SOB and hypoxia    - CT chest - pleural effusions with atelectasis    - + COVID 19    - elevated proBNP  in ED given zosyn, lasix, dexamethasone  initially placed on BIPAP, now on RA  c/w lasix   titrate supplemental O2 as needed  pulm consulted presenting with SOB and hypoxia    - CT chest - pleural effusions with atelectasis    - + COVID 19    - elevated proBNP  in ED given zosyn, lasix, dexamethasone  initially placed on BIPAP, now on RA  c/w lasix - changed to PO  titrate supplemental O2 as needed  pulm consulted

## 2022-05-23 NOTE — PROGRESS NOTE ADULT - PROBLEM SELECTOR PLAN 3
+COVID PCR  -Continue Remdesivir x 5 days (monitor creatine, LFTs)   -No indication for decadron, no COVID PNA on CTA chest  -LE duplex negative DVT, CTA chest neg PE  -DVT ppx  -Trend inflammatory markers.

## 2022-05-23 NOTE — PROGRESS NOTE ADULT - PROBLEM SELECTOR PLAN 1
likely 2nd to volume overload  -CTA chest with pl effusions, compressive atelectasis. No COVID PNA seen.   -Refused bipap, no longer requiring HFNC.  -VBG 5/19 noted   -C/w diuresis, keep O>I as tolerated. Consider changing to PO diuretics.   -Hypoxia resolved, keep sats >90% with O2 PRN

## 2022-05-24 LAB
ALBUMIN SERPL ELPH-MCNC: 3.7 G/DL — SIGNIFICANT CHANGE UP (ref 3.3–5)
ALP SERPL-CCNC: 202 U/L — HIGH (ref 40–120)
ALT FLD-CCNC: 19 U/L — SIGNIFICANT CHANGE UP (ref 10–45)
ANION GAP SERPL CALC-SCNC: 10 MMOL/L — SIGNIFICANT CHANGE UP (ref 5–17)
AST SERPL-CCNC: 26 U/L — SIGNIFICANT CHANGE UP (ref 10–40)
BILIRUB DIRECT SERPL-MCNC: 0.2 MG/DL — SIGNIFICANT CHANGE UP (ref 0–0.3)
BILIRUB INDIRECT FLD-MCNC: 0.3 MG/DL — SIGNIFICANT CHANGE UP (ref 0.2–1)
BILIRUB SERPL-MCNC: 0.5 MG/DL — SIGNIFICANT CHANGE UP (ref 0.2–1.2)
BUN SERPL-MCNC: 28 MG/DL — HIGH (ref 7–23)
CALCIUM SERPL-MCNC: 9.9 MG/DL — SIGNIFICANT CHANGE UP (ref 8.4–10.5)
CHLORIDE SERPL-SCNC: 99 MMOL/L — SIGNIFICANT CHANGE UP (ref 96–108)
CO2 SERPL-SCNC: 32 MMOL/L — HIGH (ref 22–31)
CREAT SERPL-MCNC: 1.23 MG/DL — SIGNIFICANT CHANGE UP (ref 0.5–1.3)
CREAT SERPL-MCNC: 1.23 MG/DL — SIGNIFICANT CHANGE UP (ref 0.5–1.3)
EGFR: 41 ML/MIN/1.73M2 — LOW
EGFR: 41 ML/MIN/1.73M2 — LOW
GLUCOSE BLDC GLUCOMTR-MCNC: 108 MG/DL — HIGH (ref 70–99)
GLUCOSE BLDC GLUCOMTR-MCNC: 112 MG/DL — HIGH (ref 70–99)
GLUCOSE BLDC GLUCOMTR-MCNC: 130 MG/DL — HIGH (ref 70–99)
GLUCOSE BLDC GLUCOMTR-MCNC: 130 MG/DL — HIGH (ref 70–99)
GLUCOSE BLDC GLUCOMTR-MCNC: 137 MG/DL — HIGH (ref 70–99)
GLUCOSE BLDC GLUCOMTR-MCNC: 157 MG/DL — HIGH (ref 70–99)
GLUCOSE SERPL-MCNC: 102 MG/DL — HIGH (ref 70–99)
HCT VFR BLD CALC: 37.2 % — SIGNIFICANT CHANGE UP (ref 34.5–45)
HGB BLD-MCNC: 11.2 G/DL — LOW (ref 11.5–15.5)
INR BLD: 1.26 RATIO — HIGH (ref 0.88–1.16)
MCHC RBC-ENTMCNC: 25.6 PG — LOW (ref 27–34)
MCHC RBC-ENTMCNC: 30.1 GM/DL — LOW (ref 32–36)
MCV RBC AUTO: 85.1 FL — SIGNIFICANT CHANGE UP (ref 80–100)
NRBC # BLD: 0 /100 WBCS — SIGNIFICANT CHANGE UP (ref 0–0)
PLATELET # BLD AUTO: 460 K/UL — HIGH (ref 150–400)
POTASSIUM SERPL-MCNC: 4.8 MMOL/L — SIGNIFICANT CHANGE UP (ref 3.5–5.3)
POTASSIUM SERPL-SCNC: 4.8 MMOL/L — SIGNIFICANT CHANGE UP (ref 3.5–5.3)
PROT SERPL-MCNC: 8.7 G/DL — HIGH (ref 6–8.3)
PROTHROM AB SERPL-ACNC: 14.6 SEC — HIGH (ref 10.5–13.4)
RBC # BLD: 4.37 M/UL — SIGNIFICANT CHANGE UP (ref 3.8–5.2)
RBC # FLD: 15.7 % — HIGH (ref 10.3–14.5)
SODIUM SERPL-SCNC: 141 MMOL/L — SIGNIFICANT CHANGE UP (ref 135–145)
WBC # BLD: 6.55 K/UL — SIGNIFICANT CHANGE UP (ref 3.8–10.5)
WBC # FLD AUTO: 6.55 K/UL — SIGNIFICANT CHANGE UP (ref 3.8–10.5)

## 2022-05-24 PROCEDURE — 93321 DOPPLER ECHO F-UP/LMTD STD: CPT | Mod: 26

## 2022-05-24 PROCEDURE — 93308 TTE F-UP OR LMTD: CPT | Mod: 26

## 2022-05-24 RX ADMIN — ENOXAPARIN SODIUM 30 MILLIGRAM(S): 100 INJECTION SUBCUTANEOUS at 04:59

## 2022-05-24 RX ADMIN — INSULIN GLARGINE 5 UNIT(S): 100 INJECTION, SOLUTION SUBCUTANEOUS at 12:00

## 2022-05-24 RX ADMIN — Medication 1: at 12:11

## 2022-05-24 RX ADMIN — BRIMONIDINE TARTRATE 1 DROP(S): 2 SOLUTION/ DROPS OPHTHALMIC at 17:41

## 2022-05-24 RX ADMIN — Medication 40 MILLIGRAM(S): at 04:59

## 2022-05-24 RX ADMIN — ENOXAPARIN SODIUM 30 MILLIGRAM(S): 100 INJECTION SUBCUTANEOUS at 17:41

## 2022-05-24 RX ADMIN — Medication 44 MICROGRAM(S): at 22:56

## 2022-05-24 RX ADMIN — BRIMONIDINE TARTRATE 1 DROP(S): 2 SOLUTION/ DROPS OPHTHALMIC at 06:36

## 2022-05-24 RX ADMIN — LATANOPROST 1 DROP(S): 0.05 SOLUTION/ DROPS OPHTHALMIC; TOPICAL at 22:02

## 2022-05-24 RX ADMIN — CHLORHEXIDINE GLUCONATE 1 APPLICATION(S): 213 SOLUTION TOPICAL at 06:36

## 2022-05-24 NOTE — PROGRESS NOTE ADULT - PROBLEM SELECTOR PLAN 1
presenting with SOB and hypoxia    - CT chest - pleural effusions with atelectasis    - + COVID 19    - elevated proBNP  in ED given zosyn, lasix, dexamethasone  initially placed on BIPAP, now on RA  c/w PO lasix  titrate supplemental O2 as needed  pulm consulted

## 2022-05-24 NOTE — PROGRESS NOTE ADULT - PROBLEM SELECTOR PLAN 1
likely 2nd to volume overload  -CTA chest with pl effusions, compressive atelectasis. No COVID PNA seen.   -Refused bipap, no longer requiring HFNC.  -VBG 5/19 noted   -Keep O>I as tolerated. S/p IV diuretics, now transitioned to PO Lasix.   -Hypoxia resolved, keep sats >90% with O2 PRN

## 2022-05-24 NOTE — PHYSICAL THERAPY INITIAL EVALUATION ADULT - ADDITIONAL COMMENTS
prior to February 2022 was independent in the home & using a rolling walker, prior to this admission patient was at inpatient rehabilitation requiring assistance with all activities

## 2022-05-24 NOTE — PROGRESS NOTE ADULT - PROBLEM SELECTOR PLAN 3
+COVID PCR  -S/p Remdesivir x 5 days   -No indication for decadron, no COVID PNA on CTA chest  -LE duplex negative DVT, CTA chest neg PE  -DVT ppx  -Trend inflammatory markers.

## 2022-05-24 NOTE — PROGRESS NOTE ADULT - ASSESSMENT
89 yo F w/ PMHx of DM, afib (not on AC), HTN, CVA (residual left sided weakness), hypothyroidism presents with acute hypoxic respiratory failure in setting of Covid19 positive.    Problem/Plan - 1:  ·  Problem: Acute respiratory failure with hypoxia and hypercapnia   ·  Plan: - in the setting of COVID19 positive  CT: New moderate bilateral pleural effusions with bilateral lower lung areas of atelectasis, left greater than right, including complete compressive atelectasis of the bilateral lower lobes.  Discussed with pulm: pt does not have COVID pna: stopped decadron.  However, will cont with Remdesivir ..  reviewed - d-dimer values from previous admission: always had been high: change Lovenox to ppx dosing. (BID given her weight)  Completed 5 days of Remdesivir.   CTa chest neg for PE, LE dopplers neg for DVT.   BIPAP per pulm as needed.  Hypoxia likely sec to pleural effusion/atelectasis  - cont Lasix 40 mg IV daily: CXR on 5/20: unchanged pleural effusion. will repeat another one (ordered). Her O2 much improving (6L --> 4L --> 2L NC).   now on po lasix : will plan to dc in 24 hrs    wean NC O2 as tolerates   Limited TTE on 2/2022: hyperdynamic LV  Given pleural effusion and hypoxia, on diuretics will repeat TTE to evaluate her LV.    Problem/Plan - 2:  ·  Problem: Hypothyroidism.   - c/w home synthroid (can convert to PO if pt swallowing)    Problem/Plan - 3:  ·  Problem: Chronic atrial fibrillation.   ·  Plan: - not on treatment (AC or rate/rhythm control) per outside nursing home documents   -monitor on telemetry.    Problem/Plan - 4:  ·  Problem: Hypertension.   ·  Plan: -monitor BP     Problem/Plan - 5:  ·  Problem: DM (diabetes mellitus).   ·  Plan: -insulin sliding scale (NPO scale)  -c/w home glargine 5U.    Problem/Plan - 6:  ·  Problem: Need for prophylactic measure.     DNR/DNI - MOLST in chart.

## 2022-05-24 NOTE — PHYSICAL THERAPY INITIAL EVALUATION ADULT - PERTINENT HX OF CURRENT PROBLEM, REHAB EVAL
as per chart review: PMH of DM, afib (not on AC), HTN, CVA (residual left sided weakness), hypothyroidism. Presents from NH with SOB, hypoxia. ABG with respiratory acidosis. Found to be COVID positive COVID positive on 5/16 while at NH. CTA chest neg PE, bilateral pleural effusion with compressive atelectasis

## 2022-05-25 ENCOUNTER — TRANSCRIPTION ENCOUNTER (OUTPATIENT)
Age: 87
End: 2022-05-25

## 2022-05-25 LAB
ALBUMIN SERPL ELPH-MCNC: 3.6 G/DL — SIGNIFICANT CHANGE UP (ref 3.3–5)
ALP SERPL-CCNC: 194 U/L — HIGH (ref 40–120)
ALT FLD-CCNC: 18 U/L — SIGNIFICANT CHANGE UP (ref 10–45)
AST SERPL-CCNC: 18 U/L — SIGNIFICANT CHANGE UP (ref 10–40)
BILIRUB DIRECT SERPL-MCNC: 0.1 MG/DL — SIGNIFICANT CHANGE UP (ref 0–0.3)
BILIRUB INDIRECT FLD-MCNC: 0.4 MG/DL — SIGNIFICANT CHANGE UP (ref 0.2–1)
BILIRUB SERPL-MCNC: 0.5 MG/DL — SIGNIFICANT CHANGE UP (ref 0.2–1.2)
CREAT SERPL-MCNC: 1.25 MG/DL — SIGNIFICANT CHANGE UP (ref 0.5–1.3)
EGFR: 40 ML/MIN/1.73M2 — LOW
GLUCOSE BLDC GLUCOMTR-MCNC: 102 MG/DL — HIGH (ref 70–99)
GLUCOSE BLDC GLUCOMTR-MCNC: 144 MG/DL — HIGH (ref 70–99)
GLUCOSE BLDC GLUCOMTR-MCNC: 164 MG/DL — HIGH (ref 70–99)
GLUCOSE BLDC GLUCOMTR-MCNC: 179 MG/DL — HIGH (ref 70–99)
INR BLD: 1.21 RATIO — HIGH (ref 0.88–1.16)
PROT SERPL-MCNC: 8.6 G/DL — HIGH (ref 6–8.3)
PROTHROM AB SERPL-ACNC: 14.1 SEC — HIGH (ref 10.5–13.4)
SARS-COV-2 RNA SPEC QL NAA+PROBE: DETECTED

## 2022-05-25 RX ORDER — ATORVASTATIN CALCIUM 80 MG/1
1 TABLET, FILM COATED ORAL
Qty: 0 | Refills: 0 | DISCHARGE

## 2022-05-25 RX ORDER — ENOXAPARIN SODIUM 100 MG/ML
30 INJECTION SUBCUTANEOUS
Qty: 0 | Refills: 0 | DISCHARGE

## 2022-05-25 RX ORDER — RIVAROXABAN 15 MG-20MG
1 KIT ORAL
Qty: 0 | Refills: 0 | DISCHARGE

## 2022-05-25 RX ORDER — FUROSEMIDE 40 MG
1 TABLET ORAL
Qty: 0 | Refills: 0 | DISCHARGE
Start: 2022-05-25

## 2022-05-25 RX ORDER — IPRATROPIUM BROMIDE 0.2 MG/ML
2 SOLUTION, NON-ORAL INHALATION
Qty: 0 | Refills: 0 | DISCHARGE

## 2022-05-25 RX ADMIN — Medication 40 MILLIGRAM(S): at 05:56

## 2022-05-25 RX ADMIN — BRIMONIDINE TARTRATE 1 DROP(S): 2 SOLUTION/ DROPS OPHTHALMIC at 17:57

## 2022-05-25 RX ADMIN — Medication 44 MICROGRAM(S): at 22:15

## 2022-05-25 RX ADMIN — LATANOPROST 1 DROP(S): 0.05 SOLUTION/ DROPS OPHTHALMIC; TOPICAL at 22:23

## 2022-05-25 RX ADMIN — BRIMONIDINE TARTRATE 1 DROP(S): 2 SOLUTION/ DROPS OPHTHALMIC at 06:17

## 2022-05-25 RX ADMIN — ENOXAPARIN SODIUM 30 MILLIGRAM(S): 100 INJECTION SUBCUTANEOUS at 17:59

## 2022-05-25 RX ADMIN — INSULIN GLARGINE 5 UNIT(S): 100 INJECTION, SOLUTION SUBCUTANEOUS at 08:37

## 2022-05-25 RX ADMIN — ENOXAPARIN SODIUM 30 MILLIGRAM(S): 100 INJECTION SUBCUTANEOUS at 05:56

## 2022-05-25 RX ADMIN — CHLORHEXIDINE GLUCONATE 1 APPLICATION(S): 213 SOLUTION TOPICAL at 13:05

## 2022-05-25 RX ADMIN — Medication 1: at 18:00

## 2022-05-25 NOTE — DISCHARGE NOTE PROVIDER - NSDCCPCAREPLAN_GEN_ALL_CORE_FT
PRINCIPAL DISCHARGE DIAGNOSIS  Diagnosis: New onset of congestive heart failure  Assessment and Plan of Treatment: Weigh yourself daily.  If you gain 3lbs in 3 days, or 5lbs in a week call your Health Care Provider.  Do not eat or drink foods containing more than 2000mg of salt (sodium) in your diet every day.  Call your Health Care Provider if you have any swelling or increased swelling in your feet, ankles, and/or stomach.  Take all of your medication as directed.  If you become dizzy call your Health Care Provider.        SECONDARY DISCHARGE DIAGNOSES  Diagnosis: 2019 novel coronavirus disease (COVID-19)  Assessment and Plan of Treatment: You tested positive for COVID 19.  You no longer require hospitalization.  Please restrict activities outside of your home except for getting medical care.  Do not go to work, school, or public areas.  Avoid using public transportation, ride-sharing, or taxis.  Separate yourself from other people and animals in your home.  Call ahead before visiting your doctor.  Wear a facemask when you are around other people. Cover your cough and sneezes.  Clean your hands often.  Avoid sharing personal household items.  Clean all frequently touched surfaces daily.      Diagnosis: Acute respiratory failure with hypoxia  Assessment and Plan of Treatment: Continue oxygen as needed    Diagnosis: Pleural effusion  Assessment and Plan of Treatment: Improved on lasix    Diagnosis: Chronic atrial fibrillation  Assessment and Plan of Treatment:     Diagnosis: Hypothyroidism  Assessment and Plan of Treatment:     Diagnosis: Hypertension  Assessment and Plan of Treatment:     Diagnosis: DM (diabetes mellitus)  Assessment and Plan of Treatment:      PRINCIPAL DISCHARGE DIAGNOSIS  Diagnosis: New onset of congestive heart failure  Assessment and Plan of Treatment: Weigh yourself daily.  If you gain 3lbs in 3 days, or 5lbs in a week call your Health Care Provider.  Do not eat or drink foods containing more than 2000mg of salt (sodium) in your diet every day.  Call your Health Care Provider if you have any swelling or increased swelling in your feet, ankles, and/or stomach.  Take all of your medication as directed.  If you become dizzy call your Health Care Provider.  Continue lasix 40 oral daily  Follow up with pulmonoloy, primary care doctor and cardiology        SECONDARY DISCHARGE DIAGNOSES  Diagnosis: Acute respiratory failure with hypoxia  Assessment and Plan of Treatment: Continue oxygen as needed, currently on room air    Diagnosis: Hypothyroidism  Assessment and Plan of Treatment: continue synthroid    Diagnosis: Hypertension  Assessment and Plan of Treatment: continue meds as ordered    Diagnosis: DM (diabetes mellitus)  Assessment and Plan of Treatment: continue gome meds  lwo carb diet    Diagnosis: Chronic atrial fibrillation  Assessment and Plan of Treatment: Xarelto as ordered    Diagnosis: 2019 novel coronavirus disease (COVID-19)  Assessment and Plan of Treatment: You tested positive for COVID 19.  You no longer require hospitalization.  Please restrict activities outside of your home except for getting medical care.  Do not go to work, school, or public areas.  Avoid using public transportation, ride-sharing, or taxis.  Separate yourself from other people and animals in your home.  Call ahead before visiting your doctor.  Wear a facemask when you are around other people. Cover your cough and sneezes.  Clean your hands often.  Avoid sharing personal household items.  Clean all frequently touched surfaces daily.  You did not require steroids, but did complete 5 days of remdesivir. No further medication needs for this      Diagnosis: Pleural effusion  Assessment and Plan of Treatment: Improved on lasix

## 2022-05-25 NOTE — DISCHARGE NOTE PROVIDER - HOSPITAL COURSE
89 yo F w/ PMHx of DM, afib (not on AC), HTN, CVA (residual left sided weakness), hypothyroidism presents with acute hypoxic respiratory failure in setting of Covid19 positive.      ·  Problem: Acute respiratory failure with hypoxia and hypercapnia   ·  Plan: - in the setting of COVID19 positive  CT: New moderate bilateral pleural effusions with bilateral lower lung areas of atelectasis, left greater than right, including complete compressive atelectasis of the bilateral lower lobes.  Discussed with pulm: pt does not have COVID pna: stopped decadron.  However, will cont with Remdesivir ..  reviewed - d-dimer values from previous admission: always had been high: change Lovenox to ppx dosing. (BID given her weight)  Completed 5 days of Remdesivir.   CTa chest neg for PE, LE dopplers neg for DVT.   BIPAP per pulm as needed.  Hypoxia likely sec to pleural effusion/atelectasis  - Her O2 much improving (6L --> 4L --> 2L NC).   now on po lasix  wean NC O2 as tolerates   Limited TTE on 2/2022: hyperdynamic LV  Given pleural effusion and hypoxia, on diuretics will repeat TTE to evaluate her LV.    ·  Problem: Hypothyroidism.   - c/w home synthroid (can convert to PO if pt swallowing)    ·  Problem: Chronic atrial fibrillation.   ·  Plan: - not on treatment (AC or rate/rhythm control) per outside nursing home documents       ·  Problem: Hypertension.   ·  Plan: -monitor BP     ·  Problem: DM (diabetes mellitus).   ·  Plan: -insulin sliding scale (NPO scale)  -c/w home glargine 5U.    Cleared by Dr Palencia for discharge to [facility]   89 yo F w/ PMHx of DM, afib (not on AC), HTN, CVA (residual left sided weakness), hypothyroidism presents with acute hypoxic respiratory failure in setting of Covid19 positive.      ·  Problem: Acute respiratory failure with hypoxia and hypercapnia   ·  Plan: - in the setting of COVID19 positive  CT: New moderate bilateral pleural effusions with bilateral lower lung areas of atelectasis, left greater than right, including complete compressive atelectasis of the bilateral lower lobes.  Discussed with pulm: pt does not have COVID pna: stopped decadron.  change Lovenox to ppx dosing. (BID given her weight)  Completed 5 days of Remdesivir.   CTa chest neg for PE, LE dopplers neg for DVT.   BIPAP per pulm as needed.  Hypoxia likely sec to pleural effusion/atelectasis  - Her O2 much improving (6L --> 4L --> 2L NC).   now on po lasix  wean NC O2 as tolerates - now on RA  Limited TTE on 2/2022: hyperdynamic LV  Given pleural effusion and hypoxia, on diuretics will repeat TTE to evaluate her LV.    ·  Problem: Hypothyroidism.   - c/w home synthroid (can convert to PO if pt swallowing)    ·  Problem: Chronic atrial fibrillation.   ·  Plan: - not on treatment (AC or rate/rhythm control) per outside nursing home documents   - on lovenox BID    ·  Problem: Hypertension.   ·  Plan: -monitor BP     ·  Problem: DM (diabetes mellitus).   ·  Plan: -insulin sliding scale (NPO scale)  -c/w home glargine 5U.    DNR/DNI MOLST in chart    Cleared by Dr Palencia for discharge to [facility]  Pt is medically cleared for  discharge back to [facility] in [place]. Pt and daughter are in  agreement. Per Leticia at [facility] [place], unable to accept pt today as there is no  bed available. Bed will be available on Thursday 5/26/2022. Transportation  arrangements made via [facility] as requested for 11 am on 5/26/2022.

## 2022-05-25 NOTE — PROVIDER CONTACT NOTE (CRITICAL VALUE NOTIFICATION) - ACTION/TREATMENT ORDERED:
no added treatment @ this time continue isolation as ordered-pt pending d/c to [facility] when bed available

## 2022-05-25 NOTE — PROGRESS NOTE ADULT - PROBLEM SELECTOR PLAN 1
likely 2nd to volume overload  -CTA chest with pl effusions, compressive atelectasis. No COVID PNA seen.   -Refused bipap, no longer requiring HFNC.  -VBG 5/19 noted   -Keep O>I as tolerated. S/p IV diuretics, now transitioned to PO Lasix.   -Hypoxia had resolved, now back on 2LNC. Possibly erroneous read as reported poor waveform. Suggest replace pulse ox probe and re-check O2 sats on RA. Keep sats >90% with O2 PRN.  -D/c planning per primary team

## 2022-05-25 NOTE — DISCHARGE NOTE PROVIDER - PROVIDER TOKENS
PROVIDER:[TOKEN:[[id number]]],PROVIDER:[TOKEN:[[id number]]] PROVIDER:[TOKEN:[[id number]]],PROVIDER:[TOKEN:[[id number]]],PROVIDER:[TOKEN:[[id number]],FOLLOWUP:[2 weeks]]

## 2022-05-25 NOTE — DISCHARGE NOTE PROVIDER - NSDCACTIVITY_GEN_ALL_CORE
Per recommendations from Dr. Garza patient and provider would like his BH chart with Dr. Vazquez reopened.  
No restrictions

## 2022-05-25 NOTE — DISCHARGE NOTE PROVIDER - CARE PROVIDER_API CALL
Emeli Moctezuma)  Medicine  [facility]  [address] [place] [address]  Phone: [phone]  Fax: [phone]  Follow Up Time:     Jeyson Patino)  Critical Care Medicine  [address] [facility] [address]  [place] [address]  Phone: [phone]  Fax: [phone]  Follow Up Time:    Emeli Moctezuma)  Medicine  [facility]  [address] [place] [address]  Phone: [phone]  Fax: [phone]  Follow Up Time:     Jeyson Patino (MD)  Critical Care Medicine  [address] [facility] [address]  [place] [address]  Phone: [phone]  Fax: [phone]  Follow Up Time:     Ty Keys ()  Cardiology; Internal Medicine  [address] [facility], [address]  [place] [address]  Phone: [phone]  Fax: [phone]  Follow Up Time: 2 weeks

## 2022-05-25 NOTE — DISCHARGE NOTE PROVIDER - NSDCMRMEDTOKEN_GEN_ALL_CORE_FT
brimonidine 0.15% ophthalmic solution: 1 drop(s) to each affected eye 2 times a day  donepezil 5 mg oral tablet: 1 tab(s) orally once a day (at bedtime)  furosemide 40 mg oral tablet: 1 tab(s) orally once a day  insulin glargine: 5 unit(s) subcutaneous once a day (at bedtime)  latanoprost 0.005% ophthalmic solution: 1 drop(s) to each affected eye once a day (in the evening)  levothyroxine: 88 microgram(s) orally once a day  Xarelto 10 mg oral tablet: 1 tab(s) orally once a day x 30 days

## 2022-05-26 ENCOUNTER — TRANSCRIPTION ENCOUNTER (OUTPATIENT)
Age: 87
End: 2022-05-26

## 2022-05-26 VITALS
OXYGEN SATURATION: 97 % | DIASTOLIC BLOOD PRESSURE: 60 MMHG | SYSTOLIC BLOOD PRESSURE: 126 MMHG | RESPIRATION RATE: 18 BRPM | HEART RATE: 67 BPM | TEMPERATURE: 98 F

## 2022-05-26 LAB
ALBUMIN SERPL ELPH-MCNC: 3.3 G/DL — SIGNIFICANT CHANGE UP (ref 3.3–5)
ALP SERPL-CCNC: 175 U/L — HIGH (ref 40–120)
ALT FLD-CCNC: 15 U/L — SIGNIFICANT CHANGE UP (ref 10–45)
AST SERPL-CCNC: 15 U/L — SIGNIFICANT CHANGE UP (ref 10–40)
BILIRUB DIRECT SERPL-MCNC: 0.1 MG/DL — SIGNIFICANT CHANGE UP (ref 0–0.3)
BILIRUB INDIRECT FLD-MCNC: 0.2 MG/DL — SIGNIFICANT CHANGE UP (ref 0.2–1)
BILIRUB SERPL-MCNC: 0.3 MG/DL — SIGNIFICANT CHANGE UP (ref 0.2–1.2)
CREAT SERPL-MCNC: 1.32 MG/DL — HIGH (ref 0.5–1.3)
EGFR: 38 ML/MIN/1.73M2 — LOW
GLUCOSE BLDC GLUCOMTR-MCNC: 103 MG/DL — HIGH (ref 70–99)
GLUCOSE BLDC GLUCOMTR-MCNC: 107 MG/DL — HIGH (ref 70–99)
GLUCOSE BLDC GLUCOMTR-MCNC: 149 MG/DL — HIGH (ref 70–99)
INR BLD: 1.14 RATIO — SIGNIFICANT CHANGE UP (ref 0.88–1.16)
PROT SERPL-MCNC: 8 G/DL — SIGNIFICANT CHANGE UP (ref 6–8.3)
PROTHROM AB SERPL-ACNC: 13.3 SEC — SIGNIFICANT CHANGE UP (ref 10.5–13.4)

## 2022-05-26 PROCEDURE — 83735 ASSAY OF MAGNESIUM: CPT

## 2022-05-26 PROCEDURE — 84132 ASSAY OF SERUM POTASSIUM: CPT

## 2022-05-26 PROCEDURE — 82330 ASSAY OF CALCIUM: CPT

## 2022-05-26 PROCEDURE — 87040 BLOOD CULTURE FOR BACTERIA: CPT

## 2022-05-26 PROCEDURE — 84295 ASSAY OF SERUM SODIUM: CPT

## 2022-05-26 PROCEDURE — U0005: CPT

## 2022-05-26 PROCEDURE — 82803 BLOOD GASES ANY COMBINATION: CPT

## 2022-05-26 PROCEDURE — 84100 ASSAY OF PHOSPHORUS: CPT

## 2022-05-26 PROCEDURE — 85025 COMPLETE CBC W/AUTO DIFF WBC: CPT

## 2022-05-26 PROCEDURE — 94660 CPAP INITIATION&MGMT: CPT

## 2022-05-26 PROCEDURE — 83880 ASSAY OF NATRIURETIC PEPTIDE: CPT

## 2022-05-26 PROCEDURE — 82565 ASSAY OF CREATININE: CPT

## 2022-05-26 PROCEDURE — 99291 CRITICAL CARE FIRST HOUR: CPT

## 2022-05-26 PROCEDURE — 83605 ASSAY OF LACTIC ACID: CPT

## 2022-05-26 PROCEDURE — 93308 TTE F-UP OR LMTD: CPT

## 2022-05-26 PROCEDURE — 82435 ASSAY OF BLOOD CHLORIDE: CPT

## 2022-05-26 PROCEDURE — 82248 BILIRUBIN DIRECT: CPT

## 2022-05-26 PROCEDURE — 71275 CT ANGIOGRAPHY CHEST: CPT | Mod: MA

## 2022-05-26 PROCEDURE — 82728 ASSAY OF FERRITIN: CPT

## 2022-05-26 PROCEDURE — 85018 HEMOGLOBIN: CPT

## 2022-05-26 PROCEDURE — 85730 THROMBOPLASTIN TIME PARTIAL: CPT

## 2022-05-26 PROCEDURE — 85014 HEMATOCRIT: CPT

## 2022-05-26 PROCEDURE — 93005 ELECTROCARDIOGRAM TRACING: CPT

## 2022-05-26 PROCEDURE — 36415 COLL VENOUS BLD VENIPUNCTURE: CPT

## 2022-05-26 PROCEDURE — 83036 HEMOGLOBIN GLYCOSYLATED A1C: CPT

## 2022-05-26 PROCEDURE — 87641 MR-STAPH DNA AMP PROBE: CPT

## 2022-05-26 PROCEDURE — 80048 BASIC METABOLIC PNL TOTAL CA: CPT

## 2022-05-26 PROCEDURE — U0003: CPT

## 2022-05-26 PROCEDURE — 82947 ASSAY GLUCOSE BLOOD QUANT: CPT

## 2022-05-26 PROCEDURE — 80053 COMPREHEN METABOLIC PANEL: CPT

## 2022-05-26 PROCEDURE — 87640 STAPH A DNA AMP PROBE: CPT

## 2022-05-26 PROCEDURE — 82962 GLUCOSE BLOOD TEST: CPT

## 2022-05-26 PROCEDURE — 84145 PROCALCITONIN (PCT): CPT

## 2022-05-26 PROCEDURE — 71045 X-RAY EXAM CHEST 1 VIEW: CPT

## 2022-05-26 PROCEDURE — 96375 TX/PRO/DX INJ NEW DRUG ADDON: CPT

## 2022-05-26 PROCEDURE — 85379 FIBRIN DEGRADATION QUANT: CPT

## 2022-05-26 PROCEDURE — 96372 THER/PROPH/DIAG INJ SC/IM: CPT | Mod: XU

## 2022-05-26 PROCEDURE — 85027 COMPLETE CBC AUTOMATED: CPT

## 2022-05-26 PROCEDURE — 80076 HEPATIC FUNCTION PANEL: CPT

## 2022-05-26 PROCEDURE — 84443 ASSAY THYROID STIM HORMONE: CPT

## 2022-05-26 PROCEDURE — 97161 PT EVAL LOW COMPLEX 20 MIN: CPT

## 2022-05-26 PROCEDURE — 93321 DOPPLER ECHO F-UP/LMTD STD: CPT

## 2022-05-26 PROCEDURE — 93970 EXTREMITY STUDY: CPT

## 2022-05-26 PROCEDURE — 85610 PROTHROMBIN TIME: CPT

## 2022-05-26 PROCEDURE — 96374 THER/PROPH/DIAG INJ IV PUSH: CPT | Mod: XU

## 2022-05-26 PROCEDURE — 84484 ASSAY OF TROPONIN QUANT: CPT

## 2022-05-26 RX ADMIN — Medication 40 MILLIGRAM(S): at 05:38

## 2022-05-26 RX ADMIN — CHLORHEXIDINE GLUCONATE 1 APPLICATION(S): 213 SOLUTION TOPICAL at 05:38

## 2022-05-26 RX ADMIN — ENOXAPARIN SODIUM 30 MILLIGRAM(S): 100 INJECTION SUBCUTANEOUS at 05:38

## 2022-05-26 RX ADMIN — BRIMONIDINE TARTRATE 1 DROP(S): 2 SOLUTION/ DROPS OPHTHALMIC at 05:38

## 2022-05-26 RX ADMIN — INSULIN GLARGINE 5 UNIT(S): 100 INJECTION, SOLUTION SUBCUTANEOUS at 08:33

## 2022-05-26 NOTE — PROGRESS NOTE ADULT - SUBJECTIVE AND OBJECTIVE BOX
Date of service: 22 @ 15:22      Patient is a 89y old  Female who presents with a chief complaint of hypoxic/hypercarbic respiratory failure (17 May 2022 09:15)                                                               INTERVAL HPI/OVERNIGHT EVENTS:    REVIEW OF SYSTEMS:     CONSTITUTIONAL: No weakness, fevers or chills  RESPIRATORY: No cough, wheezing,  No shortness of breath  CARDIOVASCULAR: No chest pain or palpitations  GASTROINTESTINAL: No abdominal pain  . No nausea, vomiting, or hematemesis; No diarrhea or constipation. No melena or hematochezia.  GENITOURINARY: No dysuria, frequency or hematuria  NEUROLOGICAL: No numbness or weakness                                                                                                                                                                                                                                                                         Medications:  MEDICATIONS  (STANDING):  brimonidine 0.2% Ophthalmic Solution 1 Drop(s) Both EYES two times a day  chlorhexidine 4% Liquid 1 Application(s) Topical <User Schedule>  dexAMETHasone  Injectable 6 milliGRAM(s) IV Push daily  dextrose 5%. 1000 milliLiter(s) (100 mL/Hr) IV Continuous <Continuous>  dextrose 5%. 1000 milliLiter(s) (50 mL/Hr) IV Continuous <Continuous>  dextrose 50% Injectable 25 Gram(s) IV Push once  dextrose 50% Injectable 12.5 Gram(s) IV Push once  dextrose 50% Injectable 25 Gram(s) IV Push once  enoxaparin Injectable 80 milliGRAM(s) SubCutaneous every 12 hours  furosemide   Injectable 40 milliGRAM(s) IV Push daily  glucagon  Injectable 1 milliGRAM(s) IntraMuscular once  insulin glargine Injectable (LANTUS) 5 Unit(s) SubCutaneous every morning  insulin lispro (ADMELOG) corrective regimen sliding scale   SubCutaneous every 6 hours  latanoprost 0.005% Ophthalmic Solution 1 Drop(s) Both EYES at bedtime  levothyroxine Injectable 44 MICROGram(s) IV Push at bedtime  piperacillin/tazobactam IVPB.. 3.375 Gram(s) IV Intermittent every 8 hours  remdesivir  IVPB 100 milliGRAM(s) IV Intermittent every 24 hours  remdesivir  IVPB   IV Intermittent   vancomycin  IVPB 1000 milliGRAM(s) IV Intermittent every 24 hours    MEDICATIONS  (PRN):  dextrose Oral Gel 15 Gram(s) Oral once PRN Blood Glucose LESS THAN 70 milliGRAM(s)/deciliter       Allergies    No Known Allergies    Intolerances      Vital Signs Last 24 Hrs  T(C): 36.6 (18 May 2022 07:46), Max: 36.8 (17 May 2022 22:31)  T(F): 97.8 (18 May 2022 07:46), Max: 98.3 (17 May 2022 22:31)  HR: 79 (18 May 2022 09:34) (73 - 86)  BP: 143/60 (18 May 2022 07:46) (105/74 - 157/93)  BP(mean): --  RR: 20 (18 May 2022 09:34) (18 - 20)  SpO2: 97% (18 May 2022 09:34) (95% - 99%)  CAPILLARY BLOOD GLUCOSE      POCT Blood Glucose.: 191 mg/dL (18 May 2022 11:53)  POCT Blood Glucose.: 95 mg/dL (18 May 2022 07:57)  POCT Blood Glucose.: 78 mg/dL (18 May 2022 05:44)  POCT Blood Glucose.: 164 mg/dL (18 May 2022 01:06)  POCT Blood Glucose.: 220 mg/dL (17 May 2022 20:59)  POCT Blood Glucose.: 252 mg/dL (17 May 2022 16:48)      - @ 07:01  -   @ 07:00  --------------------------------------------------------  IN: 500 mL / OUT: 0 mL / NET: 500 mL      Physical Exam:    Daily     Daily Weight in k (18 May 2022 07:46)  General:  elderly mild distress   HEENT:  Nonicteric, PERRLA  CV:  RRR, S1S2   Lungs:  B ronchi   Abdomen:  Soft, non-tender, no distended, positive BS  Extremities:  2+ pulses, no c/c, no edema  Skin:  Warm and dry, no rashes  :  No long  Neuro:  grossly NF                                                                                                                                                                                                                                                                    LABS:                               8.9    13.74 )-----------( 487      ( 18 May 2022 06:50 )             29.4                          143  |  105  |  28<H>  ----------------------------<  58<L>  4.4   |  22  |  1.34<H>    Ca    9.5      18 May 2022 06:49  Phos  4.6     05-  Mg     2.1     -    TPro  7.8  /  Alb  3.5  /  TBili  0.5  /  DBili  0.2  /  AST  18  /  ALT  25  /  AlkPhos  269<H>  05-18                       RADIOLOGY & ADDITIONAL TESTS         I personally reviewed: [  ]EKG   [  ]CXR    [  ] CT      A/P:         Discussed with :     Eunice consultants' Notes   Time spent :  
Date of service: 22 @ 22:42      Patient is a 89y old  Female who presents with a chief complaint of hypoxic/hypercarbic respiratory failure (25 May 2022 17:33)                                                               INTERVAL HPI/OVERNIGHT EVENTS:    REVIEW OF SYSTEMS:     CONSTITUTIONAL: No weakness, fevers or chills  EYES/ENT: No visual changes , no ear ache   NECK: No pain or stiffness  RESPIRATORY: No cough, wheezing,  No shortness of breath  CARDIOVASCULAR: No chest pain or palpitations  GASTROINTESTINAL: No abdominal pain  . No nausea, vomiting, or hematemesis; No diarrhea or constipation. No melena or hematochezia.  GENITOURINARY: No dysuria, frequency or hematuria  NEUROLOGICAL: No numbness or weakness  SKIN: No itching, burning, rashes, or lesions                                                                                                                                                                                                                                                                                 Medications:  MEDICATIONS  (STANDING):  brimonidine 0.2% Ophthalmic Solution 1 Drop(s) Both EYES two times a day  chlorhexidine 4% Liquid 1 Application(s) Topical <User Schedule>  dextrose 5%. 1000 milliLiter(s) (50 mL/Hr) IV Continuous <Continuous>  dextrose 5%. 1000 milliLiter(s) (100 mL/Hr) IV Continuous <Continuous>  dextrose 50% Injectable 25 Gram(s) IV Push once  dextrose 50% Injectable 12.5 Gram(s) IV Push once  dextrose 50% Injectable 25 Gram(s) IV Push once  enoxaparin Injectable 30 milliGRAM(s) SubCutaneous every 12 hours  furosemide    Tablet 40 milliGRAM(s) Oral daily  glucagon  Injectable 1 milliGRAM(s) IntraMuscular once  insulin glargine Injectable (LANTUS) 5 Unit(s) SubCutaneous every morning  insulin lispro (ADMELOG) corrective regimen sliding scale   SubCutaneous every 6 hours  latanoprost 0.005% Ophthalmic Solution 1 Drop(s) Both EYES at bedtime  levothyroxine Injectable 44 MICROGram(s) IV Push at bedtime    MEDICATIONS  (PRN):  dextrose Oral Gel 15 Gram(s) Oral once PRN Blood Glucose LESS THAN 70 milliGRAM(s)/deciliter       Allergies    No Known Allergies    Intolerances      Vital Signs Last 24 Hrs  T(C): 36.4 (25 May 2022 17:49), Max: 36.9 (25 May 2022 08:59)  T(F): 97.6 (25 May 2022 17:49), Max: 98.5 (25 May 2022 08:59)  HR: 73 (25 May 2022 17:49) (67 - 78)  BP: 148/72 (25 May 2022 17:49) (117/65 - 148/72)  BP(mean): --  RR: 18 (25 May 2022 17:49) (18 - 18)  SpO2: 97% (25 May 2022 17:49) (97% - 97%)  CAPILLARY BLOOD GLUCOSE      POCT Blood Glucose.: 179 mg/dL (25 May 2022 22:20)  POCT Blood Glucose.: 164 mg/dL (25 May 2022 17:02)  POCT Blood Glucose.: 144 mg/dL (25 May 2022 12:13)  POCT Blood Glucose.: 102 mg/dL (25 May 2022 08:11)       @ 07:  -   @ 07:00  --------------------------------------------------------  IN: 420 mL / OUT: 800 mL / NET: -380 mL     @ 07:01  -   @ 22:42  --------------------------------------------------------  IN: 610 mL / OUT: 0 mL / NET: 610 mL      Physical Exam:    Daily     Daily Weight in k (25 May 2022 09:33)  General:  Well appearing, NAD, not cachetic  HEENT:  Nonicteric, PERRLA  CV:  RRR, S1S2   Lungs:  CTA B/L, no wheezes, rales, rhonchi  Abdomen:  Soft, non-tender, no distended, positive BS  Extremities:  2+ pulses, no c/c, no edema  Skin:  Warm and dry, no rashes  :  No long  Neuro:  AAOx3, non-focal, grossly intact                                                                                                                                                                                                                                                                                                LABS:                               11.2   6.55  )-----------( 460      ( 24 May 2022 07:08 )             37.2                      05-25    x   |  x   |  x   ----------------------------<  x   x    |  x   |  1.25    Ca    9.9      24 May 2022 07:05    TPro  8.6<H>  /  Alb  3.6  /  TBili  0.5  /  DBili  0.1  /  AST  18  /  ALT  18  /  AlkPhos  194<H>  05-25                       RADIOLOGY & ADDITIONAL TESTS         I personally reviewed: [  ]EKG   [  ]CXR    [  ] CT      A/P:         Discussed with :     Eunice consultants' Notes   Time spent :  
Follow-up Pulm Progress Note    No new respiratory events overnight.  Denies SOB/CP.   Sats 96% RA     Medications:  MEDICATIONS  (STANDING):  brimonidine 0.2% Ophthalmic Solution 1 Drop(s) Both EYES two times a day  chlorhexidine 4% Liquid 1 Application(s) Topical <User Schedule>  dextrose 5%. 1000 milliLiter(s) (100 mL/Hr) IV Continuous <Continuous>  dextrose 5%. 1000 milliLiter(s) (50 mL/Hr) IV Continuous <Continuous>  dextrose 50% Injectable 25 Gram(s) IV Push once  dextrose 50% Injectable 12.5 Gram(s) IV Push once  dextrose 50% Injectable 25 Gram(s) IV Push once  enoxaparin Injectable 30 milliGRAM(s) SubCutaneous every 12 hours  furosemide   Injectable 40 milliGRAM(s) IV Push daily  glucagon  Injectable 1 milliGRAM(s) IntraMuscular once  insulin glargine Injectable (LANTUS) 5 Unit(s) SubCutaneous every morning  insulin lispro (ADMELOG) corrective regimen sliding scale   SubCutaneous every 6 hours  latanoprost 0.005% Ophthalmic Solution 1 Drop(s) Both EYES at bedtime  levothyroxine Injectable 44 MICROGram(s) IV Push at bedtime    MEDICATIONS  (PRN):  dextrose Oral Gel 15 Gram(s) Oral once PRN Blood Glucose LESS THAN 70 milliGRAM(s)/deciliter          Vital Signs Last 24 Hrs  T(C): 36.4 (23 May 2022 00:58), Max: 36.4 (23 May 2022 00:58)  T(F): 97.6 (23 May 2022 00:58), Max: 97.6 (23 May 2022 00:58)  HR: 70 (23 May 2022 05:26) (70 - 79)  BP: 136/74 (23 May 2022 05:26) (136/74 - 166/68)  BP(mean): --  RR: 18 (23 May 2022 05:26) (18 - 18)  SpO2: 95% (23 May 2022 05:26) (95% - 96%)          05-22 @ 07:01  -  05-23 @ 07:00  --------------------------------------------------------  IN: 240 mL / OUT: 300 mL / NET: -60 mL          LABS:                        10.6   7.24  )-----------( 436      ( 23 May 2022 07:18 )             35.5     05-23    141  |  102  |  26<H>  ----------------------------<  108<H>  5.5<H>   |  30  |  1.14    Ca    9.3      23 May 2022 07:18  Mg     1.8     05-23    TPro  7.8  /  Alb  3.3  /  TBili  0.3  /  DBili  0.1  /  AST  17  /  ALT  16  /  AlkPhos  203<H>  05-23          CAPILLARY BLOOD GLUCOSE      POCT Blood Glucose.: 133 mg/dL (23 May 2022 07:58)    PT/INR - ( 23 May 2022 07:18 )   PT: 16.1 sec;   INR: 1.38 ratio                             CULTURES: (if applicable)    Culture - Blood (collected 05-16-22 @ 13:43)  Source: .Blood Blood-Peripheral  Final Report (05-21-22 @ 18:00):    No Growth Final    Culture - Blood (collected 05-16-22 @ 13:32)  Source: .Blood Blood-Peripheral  Final Report (05-21-22 @ 18:00):    No Growth Final          Physical Examination:  PULM: decreased BS bases   CVS: S1, S2 heard    RADIOLOGY REVIEWED  5/22: small b/l pl effusions/atelectasis       CT chest: < from: CT Angio Chest PE Protocol w/ IV Cont (05.16.22 @ 18:38) >  FINDINGS:    LUNGS AND AIRWAYS: Bilateral lower lung areas of compressive atelectasis   with complete involvement of the bilateral lower lobes. Left upper lobe   areas of atelectasis with involvement of the lingula.  PLEURA: New moderate bilateral pleural effusions.  MEDIASTINUM AND GLENNY: No lymphadenopathy. Small hiatal hernia.  VESSELS: This study is limited for evaluation of the subsegmental or   smaller pulmonary arterial branches due to superimposed respiratory   motion artifact. No pulmonary arterial emboli are identified within the   well visualized pulmonary arteries. Atherosclerotic changes of the aorta   and coronary arteries. Main pulmonary artery appears enlarged which can   be seen in the setting of pulmonary arterial hypertension.  HEART: Heart size is enlarged. No pericardial effusion. Mitral annular   calcifications. Biatrial enlargement.  CHEST WALL AND LOWER NECK: Within normal limits. Subcutaneous edema.  VISUALIZED UPPER ABDOMEN: Left renalcyst.  BONES: Degenerative changes.    IMPRESSION:  Limited study for evaluation of the subsegmental or smaller pulmonary   arterial branches. No pulmonary arterial emboli within the well   visualized pulmonary arteries.    New moderate bilateral pleural effusions with bilateral lower lung areas   of atelectasis, left greater than right, including complete compressive   atelectasis of the bilateral lower lobes.    < end of copied text >  
Follow-up Pulm Progress Note    changed from HFNC to 6LNC sats maintaining 97%   denies SOB/CP     Medications:  MEDICATIONS  (STANDING):  brimonidine 0.2% Ophthalmic Solution 1 Drop(s) Both EYES two times a day  chlorhexidine 4% Liquid 1 Application(s) Topical <User Schedule>  dextrose 5%. 1000 milliLiter(s) (100 mL/Hr) IV Continuous <Continuous>  dextrose 5%. 1000 milliLiter(s) (50 mL/Hr) IV Continuous <Continuous>  dextrose 50% Injectable 25 Gram(s) IV Push once  dextrose 50% Injectable 12.5 Gram(s) IV Push once  dextrose 50% Injectable 25 Gram(s) IV Push once  enoxaparin Injectable 30 milliGRAM(s) SubCutaneous every 12 hours  furosemide   Injectable 40 milliGRAM(s) IV Push daily  glucagon  Injectable 1 milliGRAM(s) IntraMuscular once  insulin glargine Injectable (LANTUS) 5 Unit(s) SubCutaneous every morning  insulin lispro (ADMELOG) corrective regimen sliding scale   SubCutaneous every 6 hours  latanoprost 0.005% Ophthalmic Solution 1 Drop(s) Both EYES at bedtime  levothyroxine Injectable 44 MICROGram(s) IV Push at bedtime  remdesivir  IVPB 100 milliGRAM(s) IV Intermittent every 24 hours  remdesivir  IVPB   IV Intermittent     MEDICATIONS  (PRN):  dextrose Oral Gel 15 Gram(s) Oral once PRN Blood Glucose LESS THAN 70 milliGRAM(s)/deciliter          Vital Signs Last 24 Hrs  T(C): 36.6 (18 May 2022 23:52), Max: 36.6 (18 May 2022 23:52)  T(F): 97.9 (18 May 2022 23:52), Max: 97.9 (18 May 2022 23:52)  HR: 69 (19 May 2022 08:47) (69 - 89)  BP: 156/79 (18 May 2022 23:52) (145/86 - 156/79)  BP(mean): --  RR: 20 (19 May 2022 08:47) (18 - 22)  SpO2: 99% (19 May 2022 08:47) (94% - 99%)      VBG pH 7.35 05-19 @ 07:07    VBG pCO2 58 05-19 @ 07:07    VBG O2 sat 43.3 05-19 @ 07:07    VBG lactate -- 05-19 @ 07:07      05-18 @ 07:01  -  05-19 @ 07:00  --------------------------------------------------------  IN: 100 mL / OUT: 700 mL / NET: -600 mL          LABS:                        8.9    13.74 )-----------( 487      ( 18 May 2022 06:50 )             29.4     05-19    137  |  100  |  27<H>  ----------------------------<  136<H>  3.8   |  24  |  1.20    Ca    9.1      19 May 2022 06:55    TPro  8.0  /  Alb  3.3  /  TBili  0.5  /  DBili  x   /  AST  19  /  ALT  22  /  AlkPhos  274<H>  05-19          CAPILLARY BLOOD GLUCOSE      POCT Blood Glucose.: 147 mg/dL (19 May 2022 12:04)    PT/INR - ( 19 May 2022 07:02 )   PT: 17.0 sec;   INR: 1.47 ratio             Procalcitonin, Serum: 0.24 ng/mL (05-18-22 @ 06:49)    Serum Pro-Brain Natriuretic Peptide: 5061 pg/mL (05-16-22 @ 14:26)                CULTURES:     Culture - Blood (collected 05-16-22 @ 13:43)  Source: .Blood Blood-Peripheral  Preliminary Report (05-17-22 @ 18:01):    No growth to date.    Culture - Blood (collected 05-16-22 @ 13:32)  Source: .Blood Blood-Peripheral  Preliminary Report (05-17-22 @ 18:01):    No growth to date.            Physical Examination:  PULM: decreased BS bases   CVS: S1, S2 heard    RADIOLOGY REVIEWED  CT chest: < from: CT Angio Chest PE Protocol w/ IV Cont (05.16.22 @ 18:38) >  FINDINGS:    LUNGS AND AIRWAYS: Bilateral lower lung areas of compressive atelectasis   with complete involvement of the bilateral lower lobes. Left upper lobe   areas of atelectasis with involvement of the lingula.  PLEURA: New moderate bilateral pleural effusions.  MEDIASTINUM AND GLENNY: No lymphadenopathy. Small hiatal hernia.  VESSELS: This study is limited for evaluation of the subsegmental or   smaller pulmonary arterial branches due to superimposed respiratory   motion artifact. No pulmonary arterial emboli are identified within the   well visualized pulmonary arteries. Atherosclerotic changes of the aorta   and coronary arteries. Main pulmonary artery appears enlarged which can   be seen in the setting of pulmonary arterial hypertension.  HEART: Heart size is enlarged. No pericardial effusion. Mitral annular   calcifications. Biatrial enlargement.  CHEST WALL AND LOWER NECK: Within normal limits. Subcutaneous edema.  VISUALIZED UPPER ABDOMEN: Left renalcyst.  BONES: Degenerative changes.    IMPRESSION:  Limited study for evaluation of the subsegmental or smaller pulmonary   arterial branches. No pulmonary arterial emboli within the well   visualized pulmonary arteries.    New moderate bilateral pleural effusions with bilateral lower lung areas   of atelectasis, left greater than right, including complete compressive   atelectasis of the bilateral lower lobes.    < end of copied text >  
Follow-up Pulm Progress Note    refused bipap overnight again  sats 100% 6LNC  O2 lowered to 2LNC sats maintaining 97%     Medications:  MEDICATIONS  (STANDING):  brimonidine 0.2% Ophthalmic Solution 1 Drop(s) Both EYES two times a day  chlorhexidine 4% Liquid 1 Application(s) Topical <User Schedule>  dextrose 5%. 1000 milliLiter(s) (100 mL/Hr) IV Continuous <Continuous>  dextrose 5%. 1000 milliLiter(s) (50 mL/Hr) IV Continuous <Continuous>  dextrose 50% Injectable 25 Gram(s) IV Push once  dextrose 50% Injectable 12.5 Gram(s) IV Push once  dextrose 50% Injectable 25 Gram(s) IV Push once  enoxaparin Injectable 30 milliGRAM(s) SubCutaneous every 12 hours  furosemide   Injectable 40 milliGRAM(s) IV Push daily  glucagon  Injectable 1 milliGRAM(s) IntraMuscular once  insulin glargine Injectable (LANTUS) 5 Unit(s) SubCutaneous every morning  insulin lispro (ADMELOG) corrective regimen sliding scale   SubCutaneous every 6 hours  latanoprost 0.005% Ophthalmic Solution 1 Drop(s) Both EYES at bedtime  levothyroxine Injectable 44 MICROGram(s) IV Push at bedtime  remdesivir  IVPB 100 milliGRAM(s) IV Intermittent every 24 hours  remdesivir  IVPB   IV Intermittent     MEDICATIONS  (PRN):  dextrose Oral Gel 15 Gram(s) Oral once PRN Blood Glucose LESS THAN 70 milliGRAM(s)/deciliter          Vital Signs Last 24 Hrs  T(C): 36.9 (20 May 2022 00:29), Max: 36.9 (20 May 2022 00:29)  T(F): 98.4 (20 May 2022 00:29), Max: 98.4 (20 May 2022 00:29)  HR: 93 (20 May 2022 00:29) (67 - 93)  BP: 149/88 (20 May 2022 00:29) (149/88 - 151/67)  BP(mean): --  RR: 20 (20 May 2022 00:29) (20 - 20)  SpO2: 98% (20 May 2022 00:29) (97% - 98%)      VBG pH 7.35 05-19 @ 07:07    VBG pCO2 58 05-19 @ 07:07    VBG O2 sat 43.3 05-19 @ 07:07    VBG lactate -- 05-19 @ 07:07      05-19 @ 07:01  -  05-20 @ 07:00  --------------------------------------------------------  IN: 0 mL / OUT: 3550 mL / NET: -3550 mL          LABS:                        9.5    8.15  )-----------( 468      ( 20 May 2022 07:23 )             31.3     05-20    143  |  101  |  25<H>  ----------------------------<  107<H>  3.9   |  29  |  1.26    Ca    9.3      20 May 2022 07:20    TPro  7.7  /  Alb  3.4  /  TBili  0.4  /  DBili  0.1  /  AST  22  /  ALT  22  /  AlkPhos  243<H>  05-20          CAPILLARY BLOOD GLUCOSE      POCT Blood Glucose.: 107 mg/dL (20 May 2022 07:47)    PT/INR - ( 20 May 2022 07:24 )   PT: 16.4 sec;   INR: 1.42 ratio             Procalcitonin, Serum: 0.24 ng/mL (05-18-22 @ 06:49)          CULTURES:     Culture - Blood (collected 05-16-22 @ 13:43)  Source: .Blood Blood-Peripheral  Preliminary Report (05-17-22 @ 18:01):    No growth to date.    Culture - Blood (collected 05-16-22 @ 13:32)  Source: .Blood Blood-Peripheral  Preliminary Report (05-17-22 @ 18:01):    No growth to date.            Physical Examination:  PULM: decreased BS bases   CVS: S1, S2 heard    RADIOLOGY REVIEWED  CT chest: < from: CT Angio Chest PE Protocol w/ IV Cont (05.16.22 @ 18:38) >  FINDINGS:    LUNGS AND AIRWAYS: Bilateral lower lung areas of compressive atelectasis   with complete involvement of the bilateral lower lobes. Left upper lobe   areas of atelectasis with involvement of the lingula.  PLEURA: New moderate bilateral pleural effusions.  MEDIASTINUM AND GLENNY: No lymphadenopathy. Small hiatal hernia.  VESSELS: This study is limited for evaluation of the subsegmental or   smaller pulmonary arterial branches due to superimposed respiratory   motion artifact. No pulmonary arterial emboli are identified within the   well visualized pulmonary arteries. Atherosclerotic changes of the aorta   and coronary arteries. Main pulmonary artery appears enlarged which can   be seen in the setting of pulmonary arterial hypertension.  HEART: Heart size is enlarged. No pericardial effusion. Mitral annular   calcifications. Biatrial enlargement.  CHEST WALL AND LOWER NECK: Within normal limits. Subcutaneous edema.  VISUALIZED UPPER ABDOMEN: Left renalcyst.  BONES: Degenerative changes.    IMPRESSION:  Limited study for evaluation of the subsegmental or smaller pulmonary   arterial branches. No pulmonary arterial emboli within the well   visualized pulmonary arteries.    New moderate bilateral pleural effusions with bilateral lower lung areas   of atelectasis, left greater than right, including complete compressive   atelectasis of the bilateral lower lobes.    < end of copied text >  
SUBJECTIVE / OVERNIGHT EVENTS:  --- Coverage for Dr. Palencia ---   O2 is improving, weaning down nasal canula.   the granddaughter Emmanuelle at bedside.   no cp, no sob, no n/v/d. no abdominal pain.  no headache, no dizziness.       --------------------------------------------------------------------------------------------  LABS:                        10.2   7.68  )-----------( 453      ( 21 May 2022 07:04 )             33.4     05-22    x   |  x   |  x   ----------------------------<  x   x    |  x   |  1.12    Ca    9.4      21 May 2022 06:58    TPro  7.4  /  Alb  3.2<L>  /  TBili  0.5  /  DBili  0.1  /  AST  15  /  ALT  14  /  AlkPhos  212<H>  05-22    PT/INR - ( 22 May 2022 09:00 )   PT: 18.2 sec;   INR: 1.58 ratio           CAPILLARY BLOOD GLUCOSE      POCT Blood Glucose.: 220 mg/dL (22 May 2022 12:10)  POCT Blood Glucose.: 143 mg/dL (22 May 2022 08:08)  POCT Blood Glucose.: 125 mg/dL (22 May 2022 01:55)  POCT Blood Glucose.: 201 mg/dL (21 May 2022 21:14)  POCT Blood Glucose.: 149 mg/dL (21 May 2022 17:14)            RADIOLOGY & ADDITIONAL TESTS:    Imaging Personally Reviewed:  [x] YES  [ ] NO    Consultant(s) Notes Reviewed:  [x] YES  [ ] NO    MEDICATIONS  (STANDING):  brimonidine 0.2% Ophthalmic Solution 1 Drop(s) Both EYES two times a day  chlorhexidine 4% Liquid 1 Application(s) Topical <User Schedule>  dextrose 5%. 1000 milliLiter(s) (100 mL/Hr) IV Continuous <Continuous>  dextrose 5%. 1000 milliLiter(s) (50 mL/Hr) IV Continuous <Continuous>  dextrose 50% Injectable 25 Gram(s) IV Push once  dextrose 50% Injectable 12.5 Gram(s) IV Push once  dextrose 50% Injectable 25 Gram(s) IV Push once  enoxaparin Injectable 30 milliGRAM(s) SubCutaneous every 12 hours  furosemide   Injectable 40 milliGRAM(s) IV Push daily  glucagon  Injectable 1 milliGRAM(s) IntraMuscular once  insulin glargine Injectable (LANTUS) 5 Unit(s) SubCutaneous every morning  insulin lispro (ADMELOG) corrective regimen sliding scale   SubCutaneous every 6 hours  latanoprost 0.005% Ophthalmic Solution 1 Drop(s) Both EYES at bedtime  levothyroxine Injectable 44 MICROGram(s) IV Push at bedtime    MEDICATIONS  (PRN):  dextrose Oral Gel 15 Gram(s) Oral once PRN Blood Glucose LESS THAN 70 milliGRAM(s)/deciliter      Care Discussed with Consultants/Other Providers [x] YES  [ ] NO    Vital Signs Last 24 Hrs  T(C): 37.1 (22 May 2022 08:27), Max: 37.1 (21 May 2022 16:47)  T(F): 98.7 (22 May 2022 08:27), Max: 98.7 (21 May 2022 16:47)  HR: 74 (22 May 2022 08:27) (60 - 74)  BP: 114/74 (22 May 2022 08:27) (114/74 - 155/72)  BP(mean): --  RR: 18 (22 May 2022 08:27) (18 - 20)  SpO2: 95% (22 May 2022 08:27) (93% - 96%)  I&O's Summary    21 May 2022 07:01  -  22 May 2022 07:00  --------------------------------------------------------  IN: 290 mL / OUT: 450 mL / NET: -160 mL        PHYSICAL EXAM:  GENERAL: NAD, well-developed, comfortable on 6L --> 4L --> 2L NC  HEAD:  Atraumatic, Normocephalic  EYES: EOMI, PERRLA, conjunctiva and sclera clear  NECK: Supple, No JVD  CHEST/LUNG: mild decrease breath sounds bilaterally; No wheeze   HEART: Irregular rate and rhythm; No murmurs, rubs, or gallops  ABDOMEN: Soft, Nontender, Nondistended; Bowel sounds present  Neuro: AAOx1-2, forgetful baseline, poor historian, no focal weakness   EXTREMITIES:  2+ Peripheral Pulses, No clubbing, cyanosis, or edema  SKIN: No rashes or lesions     
Subjective: Patient seen and examined. No new events except as noted.     REVIEW OF SYSTEMS:    CONSTITUTIONAL: + weakness, fevers or chills  EYES/ENT: No visual changes;  No vertigo or throat pain   NECK: No pain or stiffness  RESPIRATORY: No cough, wheezing, hemoptysis; No shortness of breath  CARDIOVASCULAR: No chest pain or palpitations  GASTROINTESTINAL: No abdominal or epigastric pain. No nausea, vomiting, or hematemesis; No diarrhea or constipation. No melena or hematochezia.  GENITOURINARY: No dysuria, frequency or hematuria  NEUROLOGICAL: No numbness or weakness  SKIN: No itching, burning, rashes, or lesions   All other review of systems is negative unless indicated above.    MEDICATIONS:  MEDICATIONS  (STANDING):  brimonidine 0.2% Ophthalmic Solution 1 Drop(s) Both EYES two times a day  chlorhexidine 4% Liquid 1 Application(s) Topical <User Schedule>  dextrose 5%. 1000 milliLiter(s) (100 mL/Hr) IV Continuous <Continuous>  dextrose 5%. 1000 milliLiter(s) (50 mL/Hr) IV Continuous <Continuous>  dextrose 50% Injectable 25 Gram(s) IV Push once  dextrose 50% Injectable 12.5 Gram(s) IV Push once  dextrose 50% Injectable 25 Gram(s) IV Push once  enoxaparin Injectable 30 milliGRAM(s) SubCutaneous every 12 hours  furosemide   Injectable 40 milliGRAM(s) IV Push daily  glucagon  Injectable 1 milliGRAM(s) IntraMuscular once  insulin glargine Injectable (LANTUS) 5 Unit(s) SubCutaneous every morning  insulin lispro (ADMELOG) corrective regimen sliding scale   SubCutaneous every 6 hours  latanoprost 0.005% Ophthalmic Solution 1 Drop(s) Both EYES at bedtime  levothyroxine Injectable 44 MICROGram(s) IV Push at bedtime  remdesivir  IVPB 100 milliGRAM(s) IV Intermittent every 24 hours  remdesivir  IVPB   IV Intermittent     PHYSICAL EXAM:  T(C): 36.6 (05-21-22 @ 01:29), Max: 37.2 (05-20-22 @ 11:52)  HR: 87 (05-21-22 @ 01:29) (87 - 89)  BP: 129/68 (05-21-22 @ 01:29) (129/68 - 147/87)  RR: 20 (05-21-22 @ 01:29) (20 - 20)  SpO2: 96% (05-21-22 @ 01:29) (96% - 98%)  Wt(kg): --  I&O's Summary    19 May 2022 07:01  -  20 May 2022 07:00  --------------------------------------------------------  IN: 0 mL / OUT: 3550 mL / NET: -3550 mL    Appearance: NAD  HEENT: dry oral mucosa, PERRL, EOMI	  Lymphatic: No lymphadenopathy , no edema  Cardiovascular: irregular S1 S2, No JVD, No murmurs , Peripheral pulses palpable 2+ bilaterally  Respiratory: Decreased BS, normal effort, NC  Gastrointestinal:  Soft, Non-tender, + BS	  Skin: No rashes, No ecchymoses, No cyanosis, warm to touch  Musculoskeletal: decreased ROM/Strength  Psychiatry: Calm, Confused  Ext: No edema  LABS:    CARDIAC MARKERS:                       9.5    8.15  )-----------( 468      ( 20 May 2022 07:23 )             31.3     05-20    143  |  101  |  25<H>  ----------------------------<  107<H>  3.9   |  29  |  1.26    Ca    9.3      20 May 2022 07:20    TPro  7.7  /  Alb  3.4  /  TBili  0.4  /  DBili  0.1  /  AST  22  /  ALT  22  /  AlkPhos  243<H>  05-20    proBNP:   Lipid Profile:   HgA1c:   TSH:     TELEMETRY: Afib 70-80s  ECG:  	  RADIOLOGY:   DIAGNOSTIC TESTING:  [ ] Echocardiogram:  [ ]  Catheterization:  [ ] Stress Test:    OTHER: 	  
Subjective: Patient seen and examined. No new events except as noted.     REVIEW OF SYSTEMS:    CONSTITUTIONAL: + weakness, fevers or chills  EYES/ENT: No visual changes;  No vertigo or throat pain   NECK: No pain or stiffness  RESPIRATORY: No cough, wheezing, hemoptysis; No shortness of breath  CARDIOVASCULAR: No chest pain or palpitations  GASTROINTESTINAL: No abdominal or epigastric pain. No nausea, vomiting, or hematemesis; No diarrhea or constipation. No melena or hematochezia.  GENITOURINARY: No dysuria, frequency or hematuria  NEUROLOGICAL: No numbness or weakness  SKIN: No itching, burning, rashes, or lesions   All other review of systems is negative unless indicated above.    MEDICATIONS:  MEDICATIONS  (STANDING):  brimonidine 0.2% Ophthalmic Solution 1 Drop(s) Both EYES two times a day  chlorhexidine 4% Liquid 1 Application(s) Topical <User Schedule>  dextrose 5%. 1000 milliLiter(s) (50 mL/Hr) IV Continuous <Continuous>  dextrose 5%. 1000 milliLiter(s) (100 mL/Hr) IV Continuous <Continuous>  dextrose 50% Injectable 25 Gram(s) IV Push once  dextrose 50% Injectable 12.5 Gram(s) IV Push once  dextrose 50% Injectable 25 Gram(s) IV Push once  enoxaparin Injectable 30 milliGRAM(s) SubCutaneous every 12 hours  furosemide    Tablet 40 milliGRAM(s) Oral daily  glucagon  Injectable 1 milliGRAM(s) IntraMuscular once  insulin glargine Injectable (LANTUS) 5 Unit(s) SubCutaneous every morning  insulin lispro (ADMELOG) corrective regimen sliding scale   SubCutaneous every 6 hours  latanoprost 0.005% Ophthalmic Solution 1 Drop(s) Both EYES at bedtime  levothyroxine Injectable 44 MICROGram(s) IV Push at bedtime      PHYSICAL EXAM:  T(C): 36.9 (05-25-22 @ 08:59), Max: 36.9 (05-25-22 @ 08:59)  HR: 78 (05-25-22 @ 08:59) (67 - 78)  BP: 117/65 (05-25-22 @ 08:59) (117/65 - 140/72)  RR: 18 (05-25-22 @ 08:59) (18 - 18)  SpO2: 97% (05-25-22 @ 00:42) (95% - 97%)  Wt(kg): --  I&O's Summary    24 May 2022 07:01  -  25 May 2022 07:00  --------------------------------------------------------  IN: 420 mL / OUT: 800 mL / NET: -380 mL    25 May 2022 07:01  -  25 May 2022 10:11  --------------------------------------------------------  IN: 180 mL / OUT: 0 mL / NET: 180 mL    Appearance: NAD	  HEENT: dry oral mucosa, PERRL, EOMI	  Lymphatic: No lymphadenopathy , no edema  Cardiovascular: Irregular S1 S2, No JVD, No murmurs , Peripheral pulses palpable 2+ bilaterally  Respiratory: Decreased BS  Gastrointestinal:  Soft, Non-tender, + BS	  Skin: No rashes, No ecchymoses, No cyanosis, warm to touch  Musculoskeletal: Decreased ROM/Strength  Psychiatry:  Mood & affect appropriate  Ext: No edema    LABS:    CARDIAC MARKERS:                        11.2   6.55  )-----------( 460      ( 24 May 2022 07:08 )             37.2     05-25    x   |  x   |  x   ----------------------------<  x   x    |  x   |  1.25    Ca    9.9      24 May 2022 07:05    TPro  8.6<H>  /  Alb  3.6  /  TBili  0.5  /  DBili  0.1  /  AST  18  /  ALT  18  /  AlkPhos  194<H>  05-25    proBNP:   Lipid Profile:   HgA1c:   TSH:     TELEMETRY: Afib	    ECG:  	  RADIOLOGY:   DIAGNOSTIC TESTING:  [ ] Echocardiogram:  [ ]  Catheterization:  [ ] Stress Test:    OTHER: 
Subjective: Patient seen and examined. No new events except as noted.     REVIEW OF SYSTEMS:    CONSTITUTIONAL: +weakness, fevers or chills  EYES/ENT: No visual changes;  No vertigo or throat pain   NECK: No pain or stiffness  RESPIRATORY: No cough, wheezing, hemoptysis; No shortness of breath  CARDIOVASCULAR: No chest pain or palpitations  GASTROINTESTINAL: No abdominal or epigastric pain. No nausea, vomiting, or hematemesis; No diarrhea or constipation. No melena or hematochezia.  GENITOURINARY: No dysuria, frequency or hematuria  NEUROLOGICAL: No numbness or weakness  SKIN: No itching, burning, rashes, or lesions   All other review of systems is negative unless indicated above.    MEDICATIONS:  MEDICATIONS  (STANDING):  brimonidine 0.2% Ophthalmic Solution 1 Drop(s) Both EYES two times a day  chlorhexidine 4% Liquid 1 Application(s) Topical <User Schedule>  dextrose 5%. 1000 milliLiter(s) (100 mL/Hr) IV Continuous <Continuous>  dextrose 5%. 1000 milliLiter(s) (50 mL/Hr) IV Continuous <Continuous>  dextrose 50% Injectable 25 Gram(s) IV Push once  dextrose 50% Injectable 12.5 Gram(s) IV Push once  dextrose 50% Injectable 25 Gram(s) IV Push once  enoxaparin Injectable 30 milliGRAM(s) SubCutaneous every 12 hours  furosemide   Injectable 40 milliGRAM(s) IV Push daily  glucagon  Injectable 1 milliGRAM(s) IntraMuscular once  insulin glargine Injectable (LANTUS) 5 Unit(s) SubCutaneous every morning  insulin lispro (ADMELOG) corrective regimen sliding scale   SubCutaneous every 6 hours  latanoprost 0.005% Ophthalmic Solution 1 Drop(s) Both EYES at bedtime  levothyroxine Injectable 44 MICROGram(s) IV Push at bedtime      PHYSICAL EXAM:  T(C): 37 (05-22-22 @ 00:49), Max: 37.1 (05-21-22 @ 16:47)  HR: 74 (05-22-22 @ 00:49) (60 - 75)  BP: 155/72 (05-22-22 @ 00:49) (104/55 - 155/72)  RR: 20 (05-22-22 @ 00:49) (20 - 20)  SpO2: 96% (05-22-22 @ 00:49) (93% - 98%)  Wt(kg): --  I&O's Summary    21 May 2022 07:01  -  22 May 2022 07:00  --------------------------------------------------------  IN: 290 mL / OUT: 450 mL / NET: -160 mL      Appearance: NAD  HEENT: dry oral mucosa, PERRL, EOMI	  Lymphatic: No lymphadenopathy , no edema  Cardiovascular: irregular S1 S2, No JVD, No murmurs , Peripheral pulses palpable 2+ bilaterally  Respiratory: Decreased BS, normal effort, NC  Gastrointestinal:  Soft, Non-tender, + BS	  Skin: No rashes, No ecchymoses, No cyanosis, warm to touch  Musculoskeletal: decreased ROM/Strength  Psychiatry: Calm, Confused  Ext: No edema    LABS:    CARDIAC MARKERS:                                10.2   7.68  )-----------( 453      ( 21 May 2022 07:04 )             33.4     05-21    142  |  100  |  21  ----------------------------<  104<H>  3.7   |  31  |  0.98    Ca    9.4      21 May 2022 06:58    TPro  7.9  /  Alb  3.4  /  TBili  0.5  /  DBili  0.1  /  AST  16  /  ALT  19  /  AlkPhos  242<H>  05-21    proBNP:   Lipid Profile:   HgA1c:   TSH:             TELEMETRY: 	AF     ECG:  	  RADIOLOGY:   DIAGNOSTIC TESTING:  [ ] Echocardiogram:  [ ]  Catheterization:  [ ] Stress Test:    OTHER: 	          
pt is stable for dc   arrangements made   see dc summary 
Follow-up Pulm Progress Note    reportedly 89% on RA this AM but poor waveform on pulse ox   denies SOB/CP     Medications:  MEDICATIONS  (STANDING):  brimonidine 0.2% Ophthalmic Solution 1 Drop(s) Both EYES two times a day  chlorhexidine 4% Liquid 1 Application(s) Topical <User Schedule>  dextrose 5%. 1000 milliLiter(s) (50 mL/Hr) IV Continuous <Continuous>  dextrose 5%. 1000 milliLiter(s) (100 mL/Hr) IV Continuous <Continuous>  dextrose 50% Injectable 25 Gram(s) IV Push once  dextrose 50% Injectable 12.5 Gram(s) IV Push once  dextrose 50% Injectable 25 Gram(s) IV Push once  enoxaparin Injectable 30 milliGRAM(s) SubCutaneous every 12 hours  furosemide    Tablet 40 milliGRAM(s) Oral daily  glucagon  Injectable 1 milliGRAM(s) IntraMuscular once  insulin glargine Injectable (LANTUS) 5 Unit(s) SubCutaneous every morning  insulin lispro (ADMELOG) corrective regimen sliding scale   SubCutaneous every 6 hours  latanoprost 0.005% Ophthalmic Solution 1 Drop(s) Both EYES at bedtime  levothyroxine Injectable 44 MICROGram(s) IV Push at bedtime    MEDICATIONS  (PRN):  dextrose Oral Gel 15 Gram(s) Oral once PRN Blood Glucose LESS THAN 70 milliGRAM(s)/deciliter          Vital Signs Last 24 Hrs  T(C): 36.9 (25 May 2022 08:59), Max: 36.9 (25 May 2022 08:59)  T(F): 98.5 (25 May 2022 08:59), Max: 98.5 (25 May 2022 08:59)  HR: 78 (25 May 2022 08:59) (67 - 78)  BP: 117/65 (25 May 2022 08:59) (117/65 - 140/72)  BP(mean): --  RR: 18 (25 May 2022 08:59) (18 - 18)  SpO2: 97% (25 May 2022 00:42) (95% - 97%)          05-24 @ 07:01  -  05-25 @ 07:00  --------------------------------------------------------  IN: 420 mL / OUT: 800 mL / NET: -380 mL          LABS:                        11.2   6.55  )-----------( 460      ( 24 May 2022 07:08 )             37.2     05-25    x   |  x   |  x   ----------------------------<  x   x    |  x   |  1.25    Ca    9.9      24 May 2022 07:05    TPro  8.6<H>  /  Alb  3.6  /  TBili  0.5  /  DBili  0.1  /  AST  18  /  ALT  18  /  AlkPhos  194<H>  05-25          CAPILLARY BLOOD GLUCOSE      POCT Blood Glucose.: 144 mg/dL (25 May 2022 12:13)    PT/INR - ( 25 May 2022 06:55 )   PT: 14.1 sec;   INR: 1.21 ratio           CULTURES:     Culture - Blood (collected 05-16-22 @ 13:43)  Source: .Blood Blood-Peripheral  Final Report (05-21-22 @ 18:00):    No Growth Final    Culture - Blood (collected 05-16-22 @ 13:32)  Source: .Blood Blood-Peripheral  Final Report (05-21-22 @ 18:00):    No Growth Final                  Physical Examination:  PULM: decreased BS bases   CVS: S1, S2 heard    RADIOLOGY REVIEWED  5/22: small b/l pl effusions/atelectasis       CT chest: < from: CT Angio Chest PE Protocol w/ IV Cont (05.16.22 @ 18:38) >  FINDINGS:    LUNGS AND AIRWAYS: Bilateral lower lung areas of compressive atelectasis   with complete involvement of the bilateral lower lobes. Left upper lobe   areas of atelectasis with involvement of the lingula.  PLEURA: New moderate bilateral pleural effusions.  MEDIASTINUM AND GLENNY: No lymphadenopathy. Small hiatal hernia.  VESSELS: This study is limited for evaluation of the subsegmental or   smaller pulmonary arterial branches due to superimposed respiratory   motion artifact. No pulmonary arterial emboli are identified within the   well visualized pulmonary arteries. Atherosclerotic changes of the aorta   and coronary arteries. Main pulmonary artery appears enlarged which can   be seen in the setting of pulmonary arterial hypertension.  HEART: Heart size is enlarged. No pericardial effusion. Mitral annular   calcifications. Biatrial enlargement.  CHEST WALL AND LOWER NECK: Within normal limits. Subcutaneous edema.  VISUALIZED UPPER ABDOMEN: Left renalcyst.  BONES: Degenerative changes.    IMPRESSION:  Limited study for evaluation of the subsegmental or smaller pulmonary   arterial branches. No pulmonary arterial emboli within the well   visualized pulmonary arteries.    New moderate bilateral pleural effusions with bilateral lower lung areas   of atelectasis, left greater than right, including complete compressive   atelectasis of the bilateral lower lobes.    < end of copied text >    
Date of service: 05-19-22 @ 18:52      Patient is a 89y old  Female who presents with a chief complaint of hypoxic/hypercarbic respiratory failure (19 May 2022 13:16)                                                               INTERVAL HPI/OVERNIGHT EVENTS:    REVIEW OF SYSTEMS:     CONSTITUTIONAL: No weakness, fevers or chills  RESPIRATORY: No cough, wheezing,  No shortness of breath  CARDIOVASCULAR: No chest pain or palpitations  GASTROINTESTINAL: No abdominal pain  . No nausea, vomiting, or hematemesis; No diarrhea or constipation. No melena or hematochezia.  GENITOURINARY: No dysuria, frequency or hematuria  NEUROLOGICAL: No numbness or weakness                                                                                                                                                                                                                                                                                Medications:  MEDICATIONS  (STANDING):  brimonidine 0.2% Ophthalmic Solution 1 Drop(s) Both EYES two times a day  chlorhexidine 4% Liquid 1 Application(s) Topical <User Schedule>  dextrose 5%. 1000 milliLiter(s) (100 mL/Hr) IV Continuous <Continuous>  dextrose 5%. 1000 milliLiter(s) (50 mL/Hr) IV Continuous <Continuous>  dextrose 50% Injectable 25 Gram(s) IV Push once  dextrose 50% Injectable 12.5 Gram(s) IV Push once  dextrose 50% Injectable 25 Gram(s) IV Push once  enoxaparin Injectable 30 milliGRAM(s) SubCutaneous every 12 hours  furosemide   Injectable 40 milliGRAM(s) IV Push daily  glucagon  Injectable 1 milliGRAM(s) IntraMuscular once  insulin glargine Injectable (LANTUS) 5 Unit(s) SubCutaneous every morning  insulin lispro (ADMELOG) corrective regimen sliding scale   SubCutaneous every 6 hours  latanoprost 0.005% Ophthalmic Solution 1 Drop(s) Both EYES at bedtime  levothyroxine Injectable 44 MICROGram(s) IV Push at bedtime  remdesivir  IVPB 100 milliGRAM(s) IV Intermittent every 24 hours  remdesivir  IVPB   IV Intermittent     MEDICATIONS  (PRN):  dextrose Oral Gel 15 Gram(s) Oral once PRN Blood Glucose LESS THAN 70 milliGRAM(s)/deciliter       Allergies    No Known Allergies    Intolerances      Vital Signs Last 24 Hrs  T(C): 36.8 (19 May 2022 16:00), Max: 36.8 (19 May 2022 16:00)  T(F): 98.3 (19 May 2022 16:00), Max: 98.3 (19 May 2022 16:00)  HR: 86 (19 May 2022 16:00) (67 - 89)  BP: 151/67 (19 May 2022 16:00) (151/67 - 156/79)  BP(mean): --  RR: 20 (19 May 2022 16:00) (20 - 20)  SpO2: 98% (19 May 2022 16:00) (94% - 99%)  CAPILLARY BLOOD GLUCOSE      POCT Blood Glucose.: 157 mg/dL (19 May 2022 17:01)  POCT Blood Glucose.: 147 mg/dL (19 May 2022 12:04)  POCT Blood Glucose.: 109 mg/dL (19 May 2022 08:13)  POCT Blood Glucose.: 203 mg/dL (18 May 2022 23:48)      05-18 @ 07:01  -  05-19 @ 07:00  --------------------------------------------------------  IN: 100 mL / OUT: 700 mL / NET: -600 mL    05-19 @ 07:01  -  05-19 @ 18:52  --------------------------------------------------------  IN: 0 mL / OUT: 2000 mL / NET: -2000 mL      Physical Exam:    Daily     Daily   General:  Well appearing, NAD, not cachetic  HEENT:  Nonicteric, PERRLA  CV:  RRR, S1S2   Lungs: poor effort otherwise cta  Abdomen:  Soft, non-tender, no distended, positive BS  Extremities:  no edema   Neuro:  AAOx3, non-focal, grossly intact                                                                                                                                                                                                                                                                                                LABS:                               8.9    13.74 )-----------( 487      ( 18 May 2022 06:50 )             29.4                      05-19    137  |  100  |  27<H>  ----------------------------<  136<H>  3.8   |  24  |  1.20    Ca    9.1      19 May 2022 06:55    TPro  8.0  /  Alb  3.3  /  TBili  0.5  /  DBili  x   /  AST  19  /  ALT  22  /  AlkPhos  274<H>  05-19                       RADIOLOGY & ADDITIONAL TESTS         I personally reviewed: [  ]EKG   [  ]CXR    [  ] CT      A/P:         Discussed with :     Eunice consultants' Notes   Time spent :  
Follow-up Pulm Progress Note    No new respiratory events overnight.  Denies SOB/CP.   Sats 96% RA    Medications:  MEDICATIONS  (STANDING):  brimonidine 0.2% Ophthalmic Solution 1 Drop(s) Both EYES two times a day  chlorhexidine 4% Liquid 1 Application(s) Topical <User Schedule>  dextrose 5%. 1000 milliLiter(s) (100 mL/Hr) IV Continuous <Continuous>  dextrose 5%. 1000 milliLiter(s) (50 mL/Hr) IV Continuous <Continuous>  dextrose 50% Injectable 25 Gram(s) IV Push once  dextrose 50% Injectable 12.5 Gram(s) IV Push once  dextrose 50% Injectable 25 Gram(s) IV Push once  enoxaparin Injectable 30 milliGRAM(s) SubCutaneous every 12 hours  furosemide    Tablet 40 milliGRAM(s) Oral daily  glucagon  Injectable 1 milliGRAM(s) IntraMuscular once  insulin glargine Injectable (LANTUS) 5 Unit(s) SubCutaneous every morning  insulin lispro (ADMELOG) corrective regimen sliding scale   SubCutaneous every 6 hours  latanoprost 0.005% Ophthalmic Solution 1 Drop(s) Both EYES at bedtime  levothyroxine Injectable 44 MICROGram(s) IV Push at bedtime    MEDICATIONS  (PRN):  dextrose Oral Gel 15 Gram(s) Oral once PRN Blood Glucose LESS THAN 70 milliGRAM(s)/deciliter          Vital Signs Last 24 Hrs  T(C): 36.3 (24 May 2022 08:31), Max: 36.7 (23 May 2022 10:49)  T(F): 97.3 (24 May 2022 08:31), Max: 98.1 (23 May 2022 10:49)  HR: 74 (24 May 2022 08:31) (68 - 88)  BP: 155/83 (24 May 2022 08:31) (105/61 - 155/83)  BP(mean): --  RR: 18 (24 May 2022 08:31) (18 - 18)  SpO2: 95% (24 May 2022 08:31) (95% - 97%)          05-23 @ 07:01  -  05-24 @ 07:00  --------------------------------------------------------  IN: 200 mL / OUT: 2100 mL / NET: -1900 mL          LABS:                        11.2   6.55  )-----------( 460      ( 24 May 2022 07:08 )             37.2     05-24    141  |  99  |  28<H>  ----------------------------<  102<H>  4.8   |  32<H>  |  1.23    Ca    9.9      24 May 2022 07:05  Mg     1.8     05-23    TPro  8.7<H>  /  Alb  3.7  /  TBili  0.5  /  DBili  0.2  /  AST  26  /  ALT  19  /  AlkPhos  202<H>  05-24          CAPILLARY BLOOD GLUCOSE      POCT Blood Glucose.: 108 mg/dL (24 May 2022 08:04)    PT/INR - ( 24 May 2022 07:11 )   PT: 14.6 sec;   INR: 1.26 ratio               CULTURES:     Culture - Blood (collected 05-16-22 @ 13:43)  Source: .Blood Blood-Peripheral  Final Report (05-21-22 @ 18:00):    No Growth Final    Culture - Blood (collected 05-16-22 @ 13:32)  Source: .Blood Blood-Peripheral  Final Report (05-21-22 @ 18:00):    No Growth Final            Physical Examination:  PULM: decreased BS bases   CVS: S1, S2 heard    RADIOLOGY REVIEWED  5/22: small b/l pl effusions/atelectasis       CT chest: < from: CT Angio Chest PE Protocol w/ IV Cont (05.16.22 @ 18:38) >  FINDINGS:    LUNGS AND AIRWAYS: Bilateral lower lung areas of compressive atelectasis   with complete involvement of the bilateral lower lobes. Left upper lobe   areas of atelectasis with involvement of the lingula.  PLEURA: New moderate bilateral pleural effusions.  MEDIASTINUM AND GLENNY: No lymphadenopathy. Small hiatal hernia.  VESSELS: This study is limited for evaluation of the subsegmental or   smaller pulmonary arterial branches due to superimposed respiratory   motion artifact. No pulmonary arterial emboli are identified within the   well visualized pulmonary arteries. Atherosclerotic changes of the aorta   and coronary arteries. Main pulmonary artery appears enlarged which can   be seen in the setting of pulmonary arterial hypertension.  HEART: Heart size is enlarged. No pericardial effusion. Mitral annular   calcifications. Biatrial enlargement.  CHEST WALL AND LOWER NECK: Within normal limits. Subcutaneous edema.  VISUALIZED UPPER ABDOMEN: Left renalcyst.  BONES: Degenerative changes.    IMPRESSION:  Limited study for evaluation of the subsegmental or smaller pulmonary   arterial branches. No pulmonary arterial emboli within the well   visualized pulmonary arteries.    New moderate bilateral pleural effusions with bilateral lower lung areas   of atelectasis, left greater than right, including complete compressive   atelectasis of the bilateral lower lobes.    < end of copied text >  
Subjective: Patient seen and examined. No new events except as noted.   Sitting up in bed, eating breakfast, on Room Air.     REVIEW OF SYSTEMS:    CONSTITUTIONAL: + weakness, fevers or chills  EYES/ENT: No visual changes;  No vertigo or throat pain   NECK: No pain or stiffness  RESPIRATORY: No cough, wheezing, hemoptysis; No shortness of breath  CARDIOVASCULAR: No chest pain or palpitations  GASTROINTESTINAL: No abdominal or epigastric pain. No nausea, vomiting, or hematemesis; No diarrhea or constipation. No melena or hematochezia.  GENITOURINARY: No dysuria, frequency or hematuria  NEUROLOGICAL: No numbness or weakness  SKIN: No itching, burning, rashes, or lesions   All other review of systems is negative unless indicated above.    MEDICATIONS:  MEDICATIONS  (STANDING):  brimonidine 0.2% Ophthalmic Solution 1 Drop(s) Both EYES two times a day  chlorhexidine 4% Liquid 1 Application(s) Topical <User Schedule>  dextrose 5%. 1000 milliLiter(s) (100 mL/Hr) IV Continuous <Continuous>  dextrose 5%. 1000 milliLiter(s) (50 mL/Hr) IV Continuous <Continuous>  dextrose 50% Injectable 25 Gram(s) IV Push once  dextrose 50% Injectable 12.5 Gram(s) IV Push once  dextrose 50% Injectable 25 Gram(s) IV Push once  enoxaparin Injectable 30 milliGRAM(s) SubCutaneous every 12 hours  furosemide   Injectable 40 milliGRAM(s) IV Push daily  glucagon  Injectable 1 milliGRAM(s) IntraMuscular once  insulin glargine Injectable (LANTUS) 5 Unit(s) SubCutaneous every morning  insulin lispro (ADMELOG) corrective regimen sliding scale   SubCutaneous every 6 hours  latanoprost 0.005% Ophthalmic Solution 1 Drop(s) Both EYES at bedtime  levothyroxine Injectable 44 MICROGram(s) IV Push at bedtime    PHYSICAL EXAM:  T(C): 36.4 (05-23-22 @ 00:58), Max: 36.4 (05-23-22 @ 00:58)  HR: 70 (05-23-22 @ 05:26) (70 - 79)  BP: 136/74 (05-23-22 @ 05:26) (136/74 - 166/68)  RR: 18 (05-23-22 @ 05:26) (18 - 18)  SpO2: 95% (05-23-22 @ 05:26) (95% - 96%)  Wt(kg): --  I&O's Summary    22 May 2022 07:01  -  23 May 2022 07:00  --------------------------------------------------------  IN: 240 mL / OUT: 300 mL / NET: -60 mL    Appearance: NAD  HEENT: dry oral mucosa, PERRL, EOMI	  Lymphatic: No lymphadenopathy , no edema  Cardiovascular: irregular S1 S2, No JVD, No murmurs , Peripheral pulses palpable 2+ bilaterally  Respiratory: Decreased BS, normal effort, NC  Gastrointestinal:  Soft, Non-tender, + BS	  Skin: No rashes, No ecchymoses, No cyanosis, warm to touch  Musculoskeletal: decreased ROM/Strength  Psychiatry: Calm, Confused  Ext: No edema    LABS:    CARDIAC MARKERS:                        10.6   7.24  )-----------( 436      ( 23 May 2022 07:18 )             35.5     05-23    141  |  102  |  26<H>  ----------------------------<  108<H>  5.5<H>   |  30  |  1.14    Ca    9.3      23 May 2022 07:18  Mg     1.8     05-23    TPro  7.8  /  Alb  3.3  /  TBili  0.3  /  DBili  0.1  /  AST  17  /  ALT  16  /  AlkPhos  203<H>  05-23    proBNP:   Lipid Profile:   HgA1c:   TSH:     TELEMETRY: Afib 70s  ECG:  	  RADIOLOGY:   DIAGNOSTIC TESTING:  [ ] Echocardiogram:  [ ]  Catheterization:  [ ] Stress Test:    OTHER: 	
Date of service: 05-20-22 @ 13:38      Patient is a 89y old  Female who presents with a chief complaint of hypoxic/hypercarbic respiratory failure (20 May 2022 10:07)                                                               INTERVAL HPI/OVERNIGHT EVENTS:    REVIEW OF SYSTEMS:     CONSTITUTIONAL: No weakness, fevers or chills  RESPIRATORY: No cough, wheezing,  No shortness of breath  CARDIOVASCULAR: No chest pain or palpitations  GASTROINTESTINAL: No abdominal pain  . No nausea, vomiting, or hematemesis; No diarrhea or constipation. No melena or hematochezia.  GENITOURINARY: No dysuria, frequency or hematuria  NEUROLOGICAL: No numbness or weakness                                                                                                                                                                                                                                                                                  Medications:  MEDICATIONS  (STANDING):  brimonidine 0.2% Ophthalmic Solution 1 Drop(s) Both EYES two times a day  chlorhexidine 4% Liquid 1 Application(s) Topical <User Schedule>  dextrose 5%. 1000 milliLiter(s) (100 mL/Hr) IV Continuous <Continuous>  dextrose 5%. 1000 milliLiter(s) (50 mL/Hr) IV Continuous <Continuous>  dextrose 50% Injectable 25 Gram(s) IV Push once  dextrose 50% Injectable 12.5 Gram(s) IV Push once  dextrose 50% Injectable 25 Gram(s) IV Push once  enoxaparin Injectable 30 milliGRAM(s) SubCutaneous every 12 hours  furosemide   Injectable 40 milliGRAM(s) IV Push daily  glucagon  Injectable 1 milliGRAM(s) IntraMuscular once  insulin glargine Injectable (LANTUS) 5 Unit(s) SubCutaneous every morning  insulin lispro (ADMELOG) corrective regimen sliding scale   SubCutaneous every 6 hours  latanoprost 0.005% Ophthalmic Solution 1 Drop(s) Both EYES at bedtime  levothyroxine Injectable 44 MICROGram(s) IV Push at bedtime  remdesivir  IVPB 100 milliGRAM(s) IV Intermittent every 24 hours  remdesivir  IVPB   IV Intermittent     MEDICATIONS  (PRN):  dextrose Oral Gel 15 Gram(s) Oral once PRN Blood Glucose LESS THAN 70 milliGRAM(s)/deciliter       Allergies    No Known Allergies    Intolerances      Vital Signs Last 24 Hrs  T(C): 37.2 (20 May 2022 11:52), Max: 37.2 (20 May 2022 11:52)  T(F): 98.9 (20 May 2022 11:52), Max: 98.9 (20 May 2022 11:52)  HR: 89 (20 May 2022 11:52) (67 - 93)  BP: 141/83 (20 May 2022 11:52) (141/83 - 151/67)  BP(mean): --  RR: 20 (20 May 2022 11:52) (20 - 20)  SpO2: 96% (20 May 2022 11:52) (96% - 98%)  CAPILLARY BLOOD GLUCOSE      POCT Blood Glucose.: 197 mg/dL (20 May 2022 12:05)  POCT Blood Glucose.: 107 mg/dL (20 May 2022 07:47)  POCT Blood Glucose.: 151 mg/dL (20 May 2022 01:11)  POCT Blood Glucose.: 202 mg/dL (19 May 2022 21:33)  POCT Blood Glucose.: 157 mg/dL (19 May 2022 17:01)      05-19 @ 07:01  -  05-20 @ 07:00  --------------------------------------------------------  IN: 0 mL / OUT: 3550 mL / NET: -3550 mL      Physical Exam:    General: NAD  HEENT:  Nonicteric, PERRLA  CV:  RRR, S1S2   Lungs:  CTA B/L, no wheezes, rales, rhonchi  Abdomen:  Soft, non-tender, no distended, positive BS  Extremities: no edema                                                                                                                                                                                                                                                                                       LABS:                               9.5    8.15  )-----------( 468      ( 20 May 2022 07:23 )             31.3                      05-20    143  |  101  |  25<H>  ----------------------------<  107<H>  3.9   |  29  |  1.26    Ca    9.3      20 May 2022 07:20    TPro  7.7  /  Alb  3.4  /  TBili  0.4  /  DBili  0.1  /  AST  22  /  ALT  22  /  AlkPhos  243<H>  05-20                       RADIOLOGY & ADDITIONAL TESTS         I personally reviewed: [  ]EKG   [  ]CXR    [  ] CT      A/P:         Discussed with :     Eunice consultants' Notes   Time spent :  
Date of service: 05-23-22 @ 21:11      Patient is a 89y old  Female who presents with a chief complaint of hypoxic/hypercarbic respiratory failure (23 May 2022 09:58)                                                               INTERVAL HPI/OVERNIGHT EVENTS:    REVIEW OF SYSTEMS:     CONSTITUTIONAL: No weakness, fevers or chills  EYES/ENT: No visual changes , no ear ache   NECK: No pain or stiffness  RESPIRATORY: No cough, wheezing,  No shortness of breath  CARDIOVASCULAR: No chest pain or palpitations  GASTROINTESTINAL: No abdominal pain  . No nausea, vomiting, or hematemesis; No diarrhea or constipation. No melena or hematochezia.  GENITOURINARY: No dysuria, frequency or hematuria  NEUROLOGICAL: No numbness or weakness  SKIN: No itching, burning, rashes, or lesions                                                                                                                                                                                                                                                                                 Medications:  MEDICATIONS  (STANDING):  brimonidine 0.2% Ophthalmic Solution 1 Drop(s) Both EYES two times a day  chlorhexidine 4% Liquid 1 Application(s) Topical <User Schedule>  dextrose 5%. 1000 milliLiter(s) (50 mL/Hr) IV Continuous <Continuous>  dextrose 5%. 1000 milliLiter(s) (100 mL/Hr) IV Continuous <Continuous>  dextrose 50% Injectable 25 Gram(s) IV Push once  dextrose 50% Injectable 12.5 Gram(s) IV Push once  dextrose 50% Injectable 25 Gram(s) IV Push once  enoxaparin Injectable 30 milliGRAM(s) SubCutaneous every 12 hours  furosemide    Tablet 40 milliGRAM(s) Oral daily  glucagon  Injectable 1 milliGRAM(s) IntraMuscular once  insulin glargine Injectable (LANTUS) 5 Unit(s) SubCutaneous every morning  insulin lispro (ADMELOG) corrective regimen sliding scale   SubCutaneous every 6 hours  latanoprost 0.005% Ophthalmic Solution 1 Drop(s) Both EYES at bedtime  levothyroxine Injectable 44 MICROGram(s) IV Push at bedtime    MEDICATIONS  (PRN):  dextrose Oral Gel 15 Gram(s) Oral once PRN Blood Glucose LESS THAN 70 milliGRAM(s)/deciliter       Allergies    No Known Allergies    Intolerances      Vital Signs Last 24 Hrs  T(C): 36.7 (23 May 2022 17:52), Max: 36.7 (23 May 2022 10:49)  T(F): 98 (23 May 2022 17:52), Max: 98.1 (23 May 2022 10:49)  HR: 88 (23 May 2022 17:52) (70 - 88)  BP: 143/80 (23 May 2022 17:52) (136/74 - 166/68)  BP(mean): --  RR: 18 (23 May 2022 17:52) (18 - 18)  SpO2: 97% (23 May 2022 17:52) (95% - 97%)  CAPILLARY BLOOD GLUCOSE      POCT Blood Glucose.: 102 mg/dL (23 May 2022 17:30)  POCT Blood Glucose.: 129 mg/dL (23 May 2022 12:10)  POCT Blood Glucose.: 133 mg/dL (23 May 2022 07:58)  POCT Blood Glucose.: 146 mg/dL (23 May 2022 00:35)  POCT Blood Glucose.: 170 mg/dL (22 May 2022 21:47)      05-22 @ 07:01  -  05-23 @ 07:00  --------------------------------------------------------  IN: 240 mL / OUT: 300 mL / NET: -60 mL    05-23 @ 07:01  -  05-23 @ 21:11  --------------------------------------------------------  IN: 200 mL / OUT: 1700 mL / NET: -1500 mL      Physical Exam:    Daily     Daily   General:  Well appearing, NAD, not cachetic  HEENT:  Nonicteric, PERRLA  CV:  RRR, S1S2   Lungs:  CTA B/L, no wheezes, rales, rhonchi  Abdomen:  Soft, non-tender, no distended, positive BS  Extremities:  2+ pulses, no c/c, no edema  Skin:  Warm and dry, no rashes  :  No long  Neuro:  AAOx3, non-focal, grossly intact                                                                                                                                                                                                                                                                                                LABS:                               10.6   7.24  )-----------( 436      ( 23 May 2022 07:18 )             35.5                      05-23    141  |  102  |  26<H>  ----------------------------<  108<H>  5.5<H>   |  30  |  1.14    Ca    9.3      23 May 2022 07:18  Mg     1.8     05-23    TPro  7.8  /  Alb  3.3  /  TBili  0.3  /  DBili  0.1  /  AST  17  /  ALT  16  /  AlkPhos  203<H>  05-23                       RADIOLOGY & ADDITIONAL TESTS         I personally reviewed: [  ]EKG   [  ]CXR    [  ] CT      A/P:         Discussed with :     Eunice consultants' Notes   Time spent :  
Date of service: 05-24-22 @ 23:35      Patient is a 89y old  Female who presents with a chief complaint of hypoxic/hypercarbic respiratory failure (24 May 2022 10:29)                                                               INTERVAL HPI/OVERNIGHT EVENTS:    REVIEW OF SYSTEMS:     CONSTITUTIONAL: No weakness, fevers or chills  EYES/ENT: No visual changes , no ear ache   NECK: No pain or stiffness  RESPIRATORY: No cough, wheezing,  No shortness of breath  CARDIOVASCULAR: No chest pain or palpitations  GASTROINTESTINAL: No abdominal pain  . No nausea, vomiting, or hematemesis; No diarrhea or constipation. No melena or hematochezia.  GENITOURINARY: No dysuria, frequency or hematuria  NEUROLOGICAL: No numbness or weakness  SKIN: No itching, burning, rashes, or lesions                                                                                                                                                                                                                                                                                 Medications:  MEDICATIONS  (STANDING):  brimonidine 0.2% Ophthalmic Solution 1 Drop(s) Both EYES two times a day  chlorhexidine 4% Liquid 1 Application(s) Topical <User Schedule>  dextrose 5%. 1000 milliLiter(s) (50 mL/Hr) IV Continuous <Continuous>  dextrose 5%. 1000 milliLiter(s) (100 mL/Hr) IV Continuous <Continuous>  dextrose 50% Injectable 25 Gram(s) IV Push once  dextrose 50% Injectable 12.5 Gram(s) IV Push once  dextrose 50% Injectable 25 Gram(s) IV Push once  enoxaparin Injectable 30 milliGRAM(s) SubCutaneous every 12 hours  furosemide    Tablet 40 milliGRAM(s) Oral daily  glucagon  Injectable 1 milliGRAM(s) IntraMuscular once  insulin glargine Injectable (LANTUS) 5 Unit(s) SubCutaneous every morning  insulin lispro (ADMELOG) corrective regimen sliding scale   SubCutaneous every 6 hours  latanoprost 0.005% Ophthalmic Solution 1 Drop(s) Both EYES at bedtime  levothyroxine Injectable 44 MICROGram(s) IV Push at bedtime    MEDICATIONS  (PRN):  dextrose Oral Gel 15 Gram(s) Oral once PRN Blood Glucose LESS THAN 70 milliGRAM(s)/deciliter       Allergies    No Known Allergies    Intolerances      Vital Signs Last 24 Hrs  T(C): 36.2 (24 May 2022 17:53), Max: 36.7 (24 May 2022 00:44)  T(F): 97.2 (24 May 2022 17:53), Max: 98.1 (24 May 2022 00:44)  HR: 75 (24 May 2022 17:53) (68 - 75)  BP: 140/72 (24 May 2022 17:53) (105/61 - 155/83)  BP(mean): --  RR: 18 (24 May 2022 17:53) (18 - 18)  SpO2: 95% (24 May 2022 17:53) (95% - 95%)  CAPILLARY BLOOD GLUCOSE      POCT Blood Glucose.: 137 mg/dL (24 May 2022 22:07)  POCT Blood Glucose.: 130 mg/dL (24 May 2022 17:14)  POCT Blood Glucose.: 157 mg/dL (24 May 2022 11:45)  POCT Blood Glucose.: 108 mg/dL (24 May 2022 08:04)  POCT Blood Glucose.: 112 mg/dL (24 May 2022 06:54)  POCT Blood Glucose.: 130 mg/dL (24 May 2022 00:31)      05-23 @ 07:01 - 05-24 @ 07:00  --------------------------------------------------------  IN: 200 mL / OUT: 2100 mL / NET: -1900 mL    05-24 @ 07:01 - 05-24 @ 23:35  --------------------------------------------------------  IN: 420 mL / OUT: 400 mL / NET: 20 mL      Physical Exam:    Daily     Daily   General:  Well appearing, NAD, not cachetic  HEENT:  Nonicteric, PERRLA  CV:  RRR, S1S2   Lungs:  CTA B/L, no wheezes, rales, rhonchi  Abdomen:  Soft, non-tender, no distended, positive BS  Extremities:  2+ pulses, no c/c, no edema  Skin:  Warm and dry, no rashes  :  No long  Neuro:  AAOx3, non-focal, grossly intact                                                                                                                                                                                                                                                                                                LABS:                               11.2   6.55  )-----------( 460      ( 24 May 2022 07:08 )             37.2                      05-24    141  |  99  |  28<H>  ----------------------------<  102<H>  4.8   |  32<H>  |  1.23    Ca    9.9      24 May 2022 07:05  Mg     1.8     05-23    TPro  8.7<H>  /  Alb  3.7  /  TBili  0.5  /  DBili  0.2  /  AST  26  /  ALT  19  /  AlkPhos  202<H>  05-24                       RADIOLOGY & ADDITIONAL TESTS         I personally reviewed: [  ]EKG   [  ]CXR    [  ] CT      A/P:         Discussed with :     Eunice consultants' Notes   Time spent :  
SUBJECTIVE / OVERNIGHT EVENTS:  --- Coverage for Dr. Palencia ---   No overnight events.  No complaints. stable on nasal canula  No cp, sob, abdominal pain.  No n/v/d. no HA/dizziness.       --------------------------------------------------------------------------------------------  LABS:                        10.2   7.68  )-----------( 453      ( 21 May 2022 07:04 )             33.4     05-21    142  |  100  |  21  ----------------------------<  104<H>  3.7   |  31  |  0.98    Ca    9.4      21 May 2022 06:58    TPro  7.9  /  Alb  3.4  /  TBili  0.5  /  DBili  0.1  /  AST  16  /  ALT  19  /  AlkPhos  242<H>  05-21    PT/INR - ( 21 May 2022 07:04 )   PT: 16.9 sec;   INR: 1.46 ratio           CAPILLARY BLOOD GLUCOSE      POCT Blood Glucose.: 131 mg/dL (21 May 2022 12:10)  POCT Blood Glucose.: 121 mg/dL (21 May 2022 07:54)  POCT Blood Glucose.: 140 mg/dL (20 May 2022 22:50)  POCT Blood Glucose.: 139 mg/dL (20 May 2022 17:29)            RADIOLOGY & ADDITIONAL TESTS:    Imaging Personally Reviewed:  [x] YES  [ ] NO    Consultant(s) Notes Reviewed:  [x] YES  [ ] NO    MEDICATIONS  (STANDING):  brimonidine 0.2% Ophthalmic Solution 1 Drop(s) Both EYES two times a day  chlorhexidine 4% Liquid 1 Application(s) Topical <User Schedule>  dextrose 5%. 1000 milliLiter(s) (100 mL/Hr) IV Continuous <Continuous>  dextrose 5%. 1000 milliLiter(s) (50 mL/Hr) IV Continuous <Continuous>  dextrose 50% Injectable 25 Gram(s) IV Push once  dextrose 50% Injectable 12.5 Gram(s) IV Push once  dextrose 50% Injectable 25 Gram(s) IV Push once  enoxaparin Injectable 30 milliGRAM(s) SubCutaneous every 12 hours  furosemide   Injectable 40 milliGRAM(s) IV Push daily  glucagon  Injectable 1 milliGRAM(s) IntraMuscular once  insulin glargine Injectable (LANTUS) 5 Unit(s) SubCutaneous every morning  insulin lispro (ADMELOG) corrective regimen sliding scale   SubCutaneous every 6 hours  latanoprost 0.005% Ophthalmic Solution 1 Drop(s) Both EYES at bedtime  levothyroxine Injectable 44 MICROGram(s) IV Push at bedtime    MEDICATIONS  (PRN):  dextrose Oral Gel 15 Gram(s) Oral once PRN Blood Glucose LESS THAN 70 milliGRAM(s)/deciliter      Care Discussed with Consultants/Other Providers [x] YES  [ ] NO    Vital Signs Last 24 Hrs  T(C): 36.9 (21 May 2022 08:33), Max: 36.9 (20 May 2022 17:48)  T(F): 98.5 (21 May 2022 08:33), Max: 98.5 (21 May 2022 08:33)  HR: 75 (21 May 2022 08:33) (75 - 87)  BP: 104/55 (21 May 2022 08:33) (104/55 - 147/87)  BP(mean): --  RR: 20 (21 May 2022 08:33) (20 - 20)  SpO2: 98% (21 May 2022 08:33) (96% - 98%)  I&O's Summary    20 May 2022 07:01  -  21 May 2022 07:00  --------------------------------------------------------  IN: 0 mL / OUT: 1100 mL / NET: -1100 mL    21 May 2022 07:01  -  21 May 2022 14:50  --------------------------------------------------------  IN: 240 mL / OUT: 0 mL / NET: 240 mL    PHYSICAL EXAM:  GENERAL: NAD, well-developed, comfortable on 6L NC  HEAD:  Atraumatic, Normocephalic  EYES: EOMI, PERRLA, conjunctiva and sclera clear  NECK: Supple, No JVD  CHEST/LUNG: mild decrease breath sounds bilaterally; No wheeze   HEART: Irregular rate and rhythm; No murmurs, rubs, or gallops  ABDOMEN: Soft, Nontender, Nondistended; Bowel sounds present  Neuro: AAOx1-2, forgetful, poor historian, no focal weakness   EXTREMITIES:  2+ Peripheral Pulses, No clubbing, cyanosis, or edema  SKIN: No rashes or lesions     
Subjective: Patient seen and examined. No new events except as noted.     REVIEW OF SYSTEMS:    CONSTITUTIONAL: + weakness, fevers or chills  EYES/ENT: No visual changes;  No vertigo or throat pain   NECK: No pain or stiffness  RESPIRATORY: No cough, wheezing, hemoptysis; No shortness of breath  CARDIOVASCULAR: No chest pain or palpitations  GASTROINTESTINAL: No abdominal or epigastric pain. No nausea, vomiting, or hematemesis; No diarrhea or constipation. No melena or hematochezia.  GENITOURINARY: No dysuria, frequency or hematuria  NEUROLOGICAL: No numbness or weakness  SKIN: No itching, burning, rashes, or lesions   All other review of systems is negative unless indicated above.    MEDICATIONS:  MEDICATIONS  (STANDING):  brimonidine 0.2% Ophthalmic Solution 1 Drop(s) Both EYES two times a day  chlorhexidine 4% Liquid 1 Application(s) Topical <User Schedule>  dextrose 5%. 1000 milliLiter(s) (100 mL/Hr) IV Continuous <Continuous>  dextrose 5%. 1000 milliLiter(s) (50 mL/Hr) IV Continuous <Continuous>  dextrose 50% Injectable 25 Gram(s) IV Push once  dextrose 50% Injectable 12.5 Gram(s) IV Push once  dextrose 50% Injectable 25 Gram(s) IV Push once  enoxaparin Injectable 30 milliGRAM(s) SubCutaneous every 12 hours  furosemide    Tablet 40 milliGRAM(s) Oral daily  glucagon  Injectable 1 milliGRAM(s) IntraMuscular once  insulin glargine Injectable (LANTUS) 5 Unit(s) SubCutaneous every morning  insulin lispro (ADMELOG) corrective regimen sliding scale   SubCutaneous every 6 hours  latanoprost 0.005% Ophthalmic Solution 1 Drop(s) Both EYES at bedtime  levothyroxine Injectable 44 MICROGram(s) IV Push at bedtime    PHYSICAL EXAM:  T(C): 36.3 (05-24-22 @ 08:31), Max: 36.7 (05-23-22 @ 10:49)  HR: 74 (05-24-22 @ 08:31) (68 - 88)  BP: 155/83 (05-24-22 @ 08:31) (105/61 - 155/83)  RR: 18 (05-24-22 @ 08:31) (18 - 18)  SpO2: 95% (05-24-22 @ 08:31) (95% - 97%)  Wt(kg): --  I&O's Summary    23 May 2022 07:01  -  24 May 2022 07:00  --------------------------------------------------------  IN: 200 mL / OUT: 2100 mL / NET: -1900 mL    Appearance: NAD  HEENT: dry oral mucosa, PERRL, EOMI	  Lymphatic: No lymphadenopathy , no edema  Cardiovascular: irregular S1 S2, No JVD, No murmurs , Peripheral pulses palpable 2+ bilaterally  Respiratory: Decreased BS, normal effort, NC  Gastrointestinal:  Soft, Non-tender, + BS	  Skin: No rashes, No ecchymoses, No cyanosis, warm to touch  Musculoskeletal: decreased ROM/Strength  Psychiatry: Calm, Confused  Ext: No edema    LABS:    CARDIAC MARKERS:                        11.2   6.55  )-----------( 460      ( 24 May 2022 07:08 )             37.2     05-24    141  |  99  |  28<H>  ----------------------------<  102<H>  4.8   |  32<H>  |  1.23    Ca    9.9      24 May 2022 07:05  Mg     1.8     05-23    TPro  8.7<H>  /  Alb  3.7  /  TBili  0.5  /  DBili  0.2  /  AST  26  /  ALT  19  /  AlkPhos  202<H>  05-24    proBNP:   Lipid Profile:   HgA1c:   TSH:     TELEMETRY: Afib 80s	    ECG:  	  RADIOLOGY:   DIAGNOSTIC TESTING:  [ ] Echocardiogram:  [ ]  Catheterization:  [ ] Stress Test:    OTHER:
Follow-up Pulm Progress Note    No new respiratory events overnight.  Denies SOB/CP.   Sats 97% RA    Medications:  MEDICATIONS  (STANDING):  brimonidine 0.2% Ophthalmic Solution 1 Drop(s) Both EYES two times a day  chlorhexidine 4% Liquid 1 Application(s) Topical <User Schedule>  dextrose 5%. 1000 milliLiter(s) (50 mL/Hr) IV Continuous <Continuous>  dextrose 5%. 1000 milliLiter(s) (100 mL/Hr) IV Continuous <Continuous>  dextrose 50% Injectable 25 Gram(s) IV Push once  dextrose 50% Injectable 12.5 Gram(s) IV Push once  dextrose 50% Injectable 25 Gram(s) IV Push once  enoxaparin Injectable 30 milliGRAM(s) SubCutaneous every 12 hours  furosemide    Tablet 40 milliGRAM(s) Oral daily  glucagon  Injectable 1 milliGRAM(s) IntraMuscular once  insulin glargine Injectable (LANTUS) 5 Unit(s) SubCutaneous every morning  insulin lispro (ADMELOG) corrective regimen sliding scale   SubCutaneous every 6 hours  latanoprost 0.005% Ophthalmic Solution 1 Drop(s) Both EYES at bedtime  levothyroxine Injectable 44 MICROGram(s) IV Push at bedtime    MEDICATIONS  (PRN):  dextrose Oral Gel 15 Gram(s) Oral once PRN Blood Glucose LESS THAN 70 milliGRAM(s)/deciliter          Vital Signs Last 24 Hrs  T(C): 36.5 (26 May 2022 10:47), Max: 36.8 (26 May 2022 00:39)  T(F): 97.7 (26 May 2022 10:47), Max: 98.2 (26 May 2022 00:39)  HR: 67 (26 May 2022 10:47) (66 - 73)  BP: 126/60 (26 May 2022 10:47) (110/69 - 148/72)  BP(mean): --  RR: 18 (26 May 2022 10:47) (18 - 18)  SpO2: 97% (26 May 2022 10:47) (96% - 97%)          05-25 @ 07:01  -  05-26 @ 07:00  --------------------------------------------------------  IN: 1160 mL / OUT: 0 mL / NET: 1160 mL          LABS:    05-26    x   |  x   |  x   ----------------------------<  x   x    |  x   |  1.32<H>      TPro  8.0  /  Alb  3.3  /  TBili  0.3  /  DBili  0.1  /  AST  15  /  ALT  15  /  AlkPhos  175<H>  05-26          CAPILLARY BLOOD GLUCOSE      POCT Blood Glucose.: 103 mg/dL (26 May 2022 07:48)    PT/INR - ( 26 May 2022 07:24 )   PT: 13.3 sec;   INR: 1.14 ratio                             CULTURES:    Culture - Blood (collected 05-16-22 @ 13:43)  Source: .Blood Blood-Peripheral  Final Report (05-21-22 @ 18:00):    No Growth Final    Culture - Blood (collected 05-16-22 @ 13:32)  Source: .Blood Blood-Peripheral  Final Report (05-21-22 @ 18:00):    No Growth Final            Physical Examination:  PULM: decreased BS bases   CVS: S1, S2 heard    RADIOLOGY REVIEWED  5/22: small b/l pl effusions/atelectasis       CT chest: < from: CT Angio Chest PE Protocol w/ IV Cont (05.16.22 @ 18:38) >  FINDINGS:    LUNGS AND AIRWAYS: Bilateral lower lung areas of compressive atelectasis   with complete involvement of the bilateral lower lobes. Left upper lobe   areas of atelectasis with involvement of the lingula.  PLEURA: New moderate bilateral pleural effusions.  MEDIASTINUM AND GLENNY: No lymphadenopathy. Small hiatal hernia.  VESSELS: This study is limited for evaluation of the subsegmental or   smaller pulmonary arterial branches due to superimposed respiratory   motion artifact. No pulmonary arterial emboli are identified within the   well visualized pulmonary arteries. Atherosclerotic changes of the aorta   and coronary arteries. Main pulmonary artery appears enlarged which can   be seen in the setting of pulmonary arterial hypertension.  HEART: Heart size is enlarged. No pericardial effusion. Mitral annular   calcifications. Biatrial enlargement.  CHEST WALL AND LOWER NECK: Within normal limits. Subcutaneous edema.  VISUALIZED UPPER ABDOMEN: Left renalcyst.  BONES: Degenerative changes.    IMPRESSION:  Limited study for evaluation of the subsegmental or smaller pulmonary   arterial branches. No pulmonary arterial emboli within the well   visualized pulmonary arteries.    New moderate bilateral pleural effusions with bilateral lower lung areas   of atelectasis, left greater than right, including complete compressive   atelectasis of the bilateral lower lobes.    < end of copied text >
Subjective: Patient seen and examined. No new events except as noted.   resting comfortably   on nasal cannula       REVIEW OF SYSTEMS:    CONSTITUTIONAL:+ weakness, fevers or chills  EYES/ENT: No visual changes;  No vertigo or throat pain   NECK: No pain or stiffness  RESPIRATORY: No cough, wheezing, hemoptysis; No shortness of breath  CARDIOVASCULAR: No chest pain or palpitations  GASTROINTESTINAL: No abdominal or epigastric pain. No nausea, vomiting, or hematemesis; No diarrhea or constipation. No melena or hematochezia.  GENITOURINARY: No dysuria, frequency or hematuria  NEUROLOGICAL: No numbness or weakness  SKIN: No itching, burning, rashes, or lesions   All other review of systems is negative unless indicated above.    MEDICATIONS:  MEDICATIONS  (STANDING):  brimonidine 0.2% Ophthalmic Solution 1 Drop(s) Both EYES two times a day  chlorhexidine 4% Liquid 1 Application(s) Topical <User Schedule>  dextrose 5%. 1000 milliLiter(s) (100 mL/Hr) IV Continuous <Continuous>  dextrose 5%. 1000 milliLiter(s) (50 mL/Hr) IV Continuous <Continuous>  dextrose 50% Injectable 25 Gram(s) IV Push once  dextrose 50% Injectable 12.5 Gram(s) IV Push once  dextrose 50% Injectable 25 Gram(s) IV Push once  enoxaparin Injectable 30 milliGRAM(s) SubCutaneous every 12 hours  furosemide   Injectable 40 milliGRAM(s) IV Push daily  glucagon  Injectable 1 milliGRAM(s) IntraMuscular once  insulin glargine Injectable (LANTUS) 5 Unit(s) SubCutaneous every morning  insulin lispro (ADMELOG) corrective regimen sliding scale   SubCutaneous every 6 hours  latanoprost 0.005% Ophthalmic Solution 1 Drop(s) Both EYES at bedtime  levothyroxine Injectable 44 MICROGram(s) IV Push at bedtime  remdesivir  IVPB 100 milliGRAM(s) IV Intermittent every 24 hours  remdesivir  IVPB   IV Intermittent       PHYSICAL EXAM:  T(C): 36.9 (05-20-22 @ 00:29), Max: 36.9 (05-20-22 @ 00:29)  HR: 93 (05-20-22 @ 00:29) (67 - 93)  BP: 149/88 (05-20-22 @ 00:29) (149/88 - 151/67)  RR: 20 (05-20-22 @ 00:29) (20 - 20)  SpO2: 98% (05-20-22 @ 00:29) (97% - 99%)  Wt(kg): --  I&O's Summary    19 May 2022 07:01  -  20 May 2022 07:00  --------------------------------------------------------  IN: 0 mL / OUT: 3550 mL / NET: -3550 mL          Appearance: NAD nasal cannula  HEENT: dry oral mucosa, PERRL, EOMI	  Lymphatic: No lymphadenopathy , no edema  Cardiovascular: irregular S1 S2, No JVD, No murmurs , Peripheral pulses palpable 2+ bilaterally  Respiratory: Decreased BS, normal effort  Gastrointestinal:  Soft, Non-tender, + BS	  Skin: No rashes, No ecchymoses, No cyanosis, warm to touch  Musculoskeletal: decreased ROM/Strength  Psychiatry: Calm, Confused  Ext: No edema  LABS:    CARDIAC MARKERS:                                9.5    8.15  )-----------( 468      ( 20 May 2022 07:23 )             31.3     05-20    143  |  101  |  25<H>  ----------------------------<  107<H>  3.9   |  29  |  1.26    Ca    9.3      20 May 2022 07:20    TPro  7.7  /  Alb  3.4  /  TBili  0.4  /  DBili  0.1  /  AST  22  /  ALT  22  /  AlkPhos  243<H>  05-20    proBNP:   Lipid Profile:   HgA1c:   TSH:             TELEMETRY: 	 SR    ECG:  	  RADIOLOGY:   DIAGNOSTIC TESTING:  [ ] Echocardiogram:  [ ]  Catheterization:  [ ] Stress Test:    OTHER: 	          
Subjective: Patient seen and examined. No new events except as noted.   Remains on HFNC.     REVIEW OF SYSTEMS:    CONSTITUTIONAL: + weakness, fevers or chills  EYES/ENT: No visual changes;  No vertigo or throat pain   NECK: No pain or stiffness  RESPIRATORY: No cough, wheezing, hemoptysis; No shortness of breath  CARDIOVASCULAR: No chest pain or palpitations  GASTROINTESTINAL: No abdominal or epigastric pain. No nausea, vomiting, or hematemesis; No diarrhea or constipation. No melena or hematochezia.  GENITOURINARY: No dysuria, frequency or hematuria  NEUROLOGICAL: No numbness or weakness  SKIN: No itching, burning, rashes, or lesions   All other review of systems is negative unless indicated above.    MEDICATIONS:  MEDICATIONS  (STANDING):  brimonidine 0.2% Ophthalmic Solution 1 Drop(s) Both EYES two times a day  chlorhexidine 4% Liquid 1 Application(s) Topical <User Schedule>  dextrose 5%. 1000 milliLiter(s) (100 mL/Hr) IV Continuous <Continuous>  dextrose 5%. 1000 milliLiter(s) (50 mL/Hr) IV Continuous <Continuous>  dextrose 50% Injectable 25 Gram(s) IV Push once  dextrose 50% Injectable 12.5 Gram(s) IV Push once  dextrose 50% Injectable 25 Gram(s) IV Push once  enoxaparin Injectable 30 milliGRAM(s) SubCutaneous every 12 hours  furosemide   Injectable 40 milliGRAM(s) IV Push daily  glucagon  Injectable 1 milliGRAM(s) IntraMuscular once  insulin glargine Injectable (LANTUS) 5 Unit(s) SubCutaneous every morning  insulin lispro (ADMELOG) corrective regimen sliding scale   SubCutaneous every 6 hours  latanoprost 0.005% Ophthalmic Solution 1 Drop(s) Both EYES at bedtime  levothyroxine Injectable 44 MICROGram(s) IV Push at bedtime  remdesivir  IVPB 100 milliGRAM(s) IV Intermittent every 24 hours  remdesivir  IVPB   IV Intermittent     PHYSICAL EXAM:  T(C): 36.6 (05-18-22 @ 23:52), Max: 36.6 (05-18-22 @ 23:52)  HR: 69 (05-19-22 @ 08:47) (69 - 89)  BP: 156/79 (05-18-22 @ 23:52) (145/86 - 156/79)  RR: 20 (05-19-22 @ 08:47) (18 - 22)  SpO2: 99% (05-19-22 @ 08:47) (94% - 99%)  Wt(kg): --  I&O's Summary    18 May 2022 07:01  -  19 May 2022 07:00  --------------------------------------------------------  IN: 100 mL / OUT: 700 mL / NET: -600 mL    19 May 2022 07:01  -  19 May 2022 12:50  --------------------------------------------------------  IN: 0 mL / OUT: 1100 mL / NET: -1100 mL    Appearance: NAD  HEENT: dry oral mucosa, PERRL, EOMI	  Lymphatic: No lymphadenopathy , no edema  Cardiovascular: irregular S1 S2, No JVD, No murmurs , Peripheral pulses palpable 2+ bilaterally  Respiratory: Decreased BS, HFNC, normal effort  Gastrointestinal:  Soft, Non-tender, + BS	  Skin: No rashes, No ecchymoses, No cyanosis, warm to touch  Musculoskeletal: decreased ROM/Strength  Psychiatry: Calm, Confused  Ext: No edema    LABS:    CARDIAC MARKERS:                   8.9    13.74 )-----------( 487      ( 18 May 2022 06:50 )             29.4     05-19    137  |  100  |  27<H>  ----------------------------<  136<H>  3.8   |  24  |  1.20    Ca    9.1      19 May 2022 06:55    TPro  8.0  /  Alb  3.3  /  TBili  0.5  /  DBili  x   /  AST  19  /  ALT  22  /  AlkPhos  274<H>  05-19    proBNP:   Lipid Profile:   HgA1c:   TSH:    TELEMETRY: 	    ECG:  	  RADIOLOGY:   DIAGNOSTIC TESTING:  [ ] Echocardiogram:  [ ]  Catheterization:  [ ] Stress Test:    OTHER: 	
Subjective: Patient seen and examined. No new events except as noted.     REVIEW OF SYSTEMS:    CONSTITUTIONAL: + weakness, fevers or chills  EYES/ENT: No visual changes;  No vertigo or throat pain   NECK: No pain or stiffness  RESPIRATORY: No cough, wheezing, hemoptysis; No shortness of breath  CARDIOVASCULAR: No chest pain or palpitations  GASTROINTESTINAL: No abdominal or epigastric pain. No nausea, vomiting, or hematemesis; No diarrhea or constipation. No melena or hematochezia.  GENITOURINARY: No dysuria, frequency or hematuria  NEUROLOGICAL: No numbness or weakness  SKIN: No itching, burning, rashes, or lesions   All other review of systems is negative unless indicated above.    MEDICATIONS:  MEDICATIONS  (STANDING):  brimonidine 0.2% Ophthalmic Solution 1 Drop(s) Both EYES two times a day  chlorhexidine 4% Liquid 1 Application(s) Topical <User Schedule>  dextrose 5%. 1000 milliLiter(s) (100 mL/Hr) IV Continuous <Continuous>  dextrose 5%. 1000 milliLiter(s) (50 mL/Hr) IV Continuous <Continuous>  dextrose 50% Injectable 25 Gram(s) IV Push once  dextrose 50% Injectable 12.5 Gram(s) IV Push once  dextrose 50% Injectable 25 Gram(s) IV Push once  enoxaparin Injectable 30 milliGRAM(s) SubCutaneous every 12 hours  furosemide    Tablet 40 milliGRAM(s) Oral daily  glucagon  Injectable 1 milliGRAM(s) IntraMuscular once  insulin glargine Injectable (LANTUS) 5 Unit(s) SubCutaneous every morning  insulin lispro (ADMELOG) corrective regimen sliding scale   SubCutaneous every 6 hours  latanoprost 0.005% Ophthalmic Solution 1 Drop(s) Both EYES at bedtime  levothyroxine Injectable 44 MICROGram(s) IV Push at bedtime    PHYSICAL EXAM:  T(C): 36.8 (05-26-22 @ 00:39), Max: 36.9 (05-25-22 @ 08:59)  HR: 66 (05-26-22 @ 00:39) (66 - 78)  BP: 110/69 (05-26-22 @ 00:39) (110/69 - 148/72)  RR: 18 (05-26-22 @ 00:39) (18 - 18)  SpO2: 96% (05-26-22 @ 00:39) (96% - 97%)  Wt(kg): --  I&O's Summary    25 May 2022 07:01  -  26 May 2022 07:00  --------------------------------------------------------  IN: 1160 mL / OUT: 0 mL / NET: 1160 mL    Appearance: NAD	  HEENT: dry oral mucosa, PERRL, EOMI	  Lymphatic: No lymphadenopathy , no edema  Cardiovascular: Irregular S1 S2, No JVD, No murmurs , Peripheral pulses palpable 2+ bilaterally  Respiratory: Decreased BS  Gastrointestinal:  Soft, Non-tender, + BS	  Skin: No rashes, No ecchymoses, No cyanosis, warm to touch  Musculoskeletal: Decreased ROM/Strength  Psychiatry:  Mood & affect appropriate  Ext: No edema    LABS:    CARDIAC MARKERS:    05-26    x   |  x   |  x   ----------------------------<  x   x    |  x   |  1.32<H>      TPro  8.0  /  Alb  3.3  /  TBili  0.3  /  DBili  0.1  /  AST  15  /  ALT  15  /  AlkPhos  175<H>  05-26    proBNP:   Lipid Profile:   HgA1c:   TSH:     TELEMETRY: 	    ECG:  	  RADIOLOGY:   DIAGNOSTIC TESTING:  [ ] Echocardiogram:  [ ]  Catheterization:  [ ] Stress Test:    OTHER:

## 2022-05-26 NOTE — PROGRESS NOTE ADULT - PROBLEM SELECTOR PROBLEM 1
Acute respiratory failure with hypoxia and hypercapnia
Acute respiratory failure with hypoxia
Acute respiratory failure with hypoxia and hypercapnia
Acute respiratory failure with hypoxia and hypercapnia
Acute respiratory failure with hypoxia
Acute respiratory failure with hypoxia and hypercapnia
Acute respiratory failure with hypoxia
Acute respiratory failure with hypoxia

## 2022-05-26 NOTE — PROGRESS NOTE ADULT - PROBLEM SELECTOR PROBLEM 3
2019 novel coronavirus disease (COVID-19)
Hypertension
Hypertension
2019 novel coronavirus disease (COVID-19)
Hypertension
2019 novel coronavirus disease (COVID-19)
Hypertension
2019 novel coronavirus disease (COVID-19)
Hypertension

## 2022-05-26 NOTE — DISCHARGE NOTE NURSING/CASE MANAGEMENT/SOCIAL WORK - PATIENT PORTAL LINK FT
You can access the FollowMyHealth Patient Portal offered by [facility] by registering at the following website: [email or web address]. By joining [facility]’s FollowMyHealth portal, you will also be able to view your health information using other applications (apps) compatible with our system.

## 2022-05-26 NOTE — PROGRESS NOTE ADULT - REASON FOR ADMISSION
hypoxic/hypercarbic respiratory failure

## 2022-05-26 NOTE — PROGRESS NOTE ADULT - PROBLEM SELECTOR PLAN 6
on Synthroid

## 2022-05-26 NOTE — PROGRESS NOTE ADULT - PROVIDER SPECIALTY LIST ADULT
Internal Medicine
Cardiology
Cardiology
Internal Medicine
Pulmonology
Internal Medicine
Internal Medicine
Pulmonology
Pulmonology
Cardiology
Pulmonology
Cardiology
Pulmonology
Pulmonology
Cardiology

## 2022-05-26 NOTE — DISCHARGE NOTE NURSING/CASE MANAGEMENT/SOCIAL WORK - NSDCPEFALRISK_GEN_ALL_CORE
For information on Fall & Injury Prevention, visit: [email or web address] OR  [email or web address] OR  https://www.cdc.gov/steadi/patient.html

## 2022-05-26 NOTE — PROGRESS NOTE ADULT - PROBLEM SELECTOR PROBLEM 2
Pleural effusion
2019 novel coronavirus disease (COVID-19)
2019 novel coronavirus disease (COVID-19)
Pleural effusion
2019 novel coronavirus disease (COVID-19)
Pleural effusion
Pleural effusion
2019 novel coronavirus disease (COVID-19)

## 2022-05-26 NOTE — PROGRESS NOTE ADULT - PROBLEM SELECTOR PLAN 1
likely 2nd to volume overload  -CTA chest with pl effusions, compressive atelectasis. No COVID PNA seen.   -Refused bipap, no longer requiring HFNC.  -VBG 5/19 noted   -Keep O>I as tolerated. S/p IV diuretics, now transitioned to PO Lasix.   -Hypoxia had resolved. Keep sats >90% with O2 PRN.  -D/c planning per primary team

## 2022-05-26 NOTE — PROGRESS NOTE ADULT - PROBLEM SELECTOR PROBLEM 4
Chronic atrial fibrillation
Dementia
Chronic atrial fibrillation
Chronic atrial fibrillation
Dementia
Chronic atrial fibrillation
Dementia
Chronic atrial fibrillation
Dementia
Chronic atrial fibrillation

## 2022-05-26 NOTE — PROGRESS NOTE ADULT - PROBLEM SELECTOR PLAN 4
by hx  -Per primary team,
by hx  -Per primary team,
rate controlled  EKG reviewed - Afib  not on AC due to history of petechial hemorrhages with CVA  monitor on Tele
by hx  -Per primary team,
by hx  -Per primary team,
rate controlled  EKG reviewed - Afib  not on AC due to history of petechial hemorrhages with CVA  monitor on Tele
by hx  -Per primary team,
rate controlled  EKG reviewed - Afib  not on AC due to history of petechial hemorrhages with CVA  monitor on Tele
by hx  -Per primary team,
rate controlled  EKG reviewed - Afib  not on AC due to history of petechial hemorrhages with CVA  monitor on Tele

## 2022-05-26 NOTE — PROGRESS NOTE ADULT - PROBLEM SELECTOR PLAN 2
on remdesivir  on HFNC  titrate supplemental O2 as needed  pulm consulted
CTA chest with b/l pl effusions with compressive atelectasis   -Keep O>I as tolerated.
CTA chest with b/l pl effusions with compressive atelectasis   -Keep O>I as tolerated.  -CXR 5/22 with improving congestion and effusions
completed remdesivir  on RA  supplemental O2 as needed  pulm following
CTA chest with b/l pl effusions with compressive atelectasis   -Keep O>I as tolerated.  -CXR ordered
completed remdesivir  on RA  supplemental O2 as needed  pulm following
CTA chest with b/l pl effusions with compressive atelectasis   -Keep O>I as tolerated.  -CXR 5/22 with improving congestion and effusions
completed remdesivir  on RA  supplemental O2 as needed  pulm following
on remdesivir  on NC  titrate supplemental O2 as needed  pulm consulted
on remdesivir  on HFNC  titrate supplemental O2 as needed  pulm consulted
completed remdesivir  on RA  supplemental O2 as needed  pulm following
on remdesivir  on NC  titrate supplemental O2 as needed  pulm consulted

## 2022-11-18 NOTE — ED PROVIDER NOTE - CPE EDP EYES NORM
Detail Level: Detailed normal... Body Location Override (Optional - Billing Will Still Be Based On Selected Body Map Location If Applicable): nasal bridge X Size Of Lesion In Cm (Optional): 0 Incorporate Mauc In Note: Yes

## 2023-03-21 NOTE — DIETITIAN INITIAL EVALUATION ADULT. - SIGNS/SYMPTOMS
?? cognitive impairment
[Time Spent: ___ minutes] : I have spent [unfilled] minutes of time on the encounter.

## 2023-03-29 NOTE — DISCHARGE NOTE PROVIDER - NSDCADMDATE_GEN_ALL_CORE_FT
[facility] for transportation at discharge: [address]: [address] [address] / Plan: Pastora Baron / Product Type: *No Product type* /     Does insurance require precert for SNF: Yes    Potential assistance Purchasing Medications: No  Meds-to-Beds request:        Mylene Saldana [phone] - [place], [address] [facility]  [phone] [place] [phone]  [facility]  [address] [place] [address]  Phone: [phone] Fax: [phone]      ALHAJI Bennett  Case Management Department  [phone]  Electronically signed by ALHAJI Bennett on 3/29/2023 at 12:20 PM 02-Feb-2022 05:44

## 2024-04-26 NOTE — ED ADULT NURSE NOTE - HIV OFFER
Unable to answer due to medical condition/unresponsive/etc... Alert-The patient is alert, awake and responds to voice. The patient is oriented to time, place, and person. The triage nurse is able to obtain subjective information.

## 2024-11-04 NOTE — DISCHARGE NOTE PROVIDER - NSTOBACCOUSAGEY/N_GEN_A_CS
Hypertension is stable and controlled  Continue current treatment regimen.  Dietary sodium restriction.  Weight loss.  Regular aerobic exercise.  Ambulatory blood pressure monitoring.  Blood pressure will be reassessed in 6 months.   No

## 2025-04-11 NOTE — PATIENT PROFILE ADULT - FUNCTIONAL ASSESSMENT - DAILY ACTIVITY 4.
Reviewed below with mother. Mother denies swimming, use of an old blanket, sleeping on an old mattress, or anything along those lines that she can identify.     Same day visit scheduled with PCP.    3 = A little assistance

## 2025-06-23 NOTE — PROGRESS NOTE ADULT - ASSESSMENT
89 yo F w/ PMHx of DM, afib (not on AC), HTN, CVA (residual left sided weakness), hypothyroidism presents with acute hypoxic respiratory failure in setting of Covid19 positive.    Problem/Plan - 1:  ·  Problem: Acute respiratory failure with hypoxia and hypercapnia   ·  Plan: - in the setting of COVID19 positive  CT: New moderate bilateral pleural effusions with bilateral lower lung areas of atelectasis, left greater than right, including complete compressive atelectasis of the bilateral lower lobes.  Discussed with pulm: pt does not have COVID pna: stopped decadron.  However, will cont with Remdesivir ..  reviewed - d-dimer values from previous admission: always had been high: change Lovenox to ppx dosing. (BID given her weight)  BIPAP per pulm as needed.  hypoxia likely sec to pleural effusion/atelectasis  - cont Lasix 40 mg IV daily: f/u cxr and will change to po in the next 24 hrs if cont to improve.  CXR: 5/20/22: unchanged small to moderate bilateral pleural effusions.  wean NC O2 as tolerates       Problem/Plan - 2:  ·  Problem: Hypothyroidism.   - c/w home synthroid (can convert to PO if pt swallowing)    Problem/Plan - 3:  ·  Problem: Chronic atrial fibrillation.   ·  Plan: - not on treatment (AC or rate/rhythm control) per outside nursing home documents   -monitor on telemetry.      Problem/Plan - 4:  ·  Problem: Hypertension.   ·  Plan: -monitor BP       Problem/Plan - 5:  ·  Problem: DM (diabetes mellitus).   ·  Plan: -insulin sliding scale (NPO scale)  -c/w home glargine 5U.    Problem/Plan - 6:  ·  Problem: Need for prophylactic measure.     DNR/DNI - MOLST in chart.   89 yo F w/ PMHx of DM, afib (not on AC), HTN, CVA (residual left sided weakness), hypothyroidism presents with acute hypoxic respiratory failure in setting of Covid19 positive.    Problem/Plan - 1:  ·  Problem: Acute respiratory failure with hypoxia and hypercapnia   ·  Plan: - in the setting of COVID19 positive  CT: New moderate bilateral pleural effusions with bilateral lower lung areas of atelectasis, left greater than right, including complete compressive atelectasis of the bilateral lower lobes.  Discussed with pulm: pt does not have COVID pna: stopped decadron.  However, will cont with Remdesivir ..  reviewed - d-dimer values from previous admission: always had been high: change Lovenox to ppx dosing. (BID given her weight)  CTa chest neg for PE, LE dopplers neg for DVT.   BIPAP per pulm as needed.  hypoxia likely sec to pleural effusion/atelectasis  - cont Lasix 40 mg IV daily: f/u cxr and will change to po in the next 24 hrs if cont to improve. cardiology follow up.   CXR: 5/20/22: unchanged small to moderate bilateral pleural effusions.  wean NC O2 as tolerates       Problem/Plan - 2:  ·  Problem: Hypothyroidism.   - c/w home synthroid (can convert to PO if pt swallowing)    Problem/Plan - 3:  ·  Problem: Chronic atrial fibrillation.   ·  Plan: - not on treatment (AC or rate/rhythm control) per outside nursing home documents   -monitor on telemetry.      Problem/Plan - 4:  ·  Problem: Hypertension.   ·  Plan: -monitor BP       Problem/Plan - 5:  ·  Problem: DM (diabetes mellitus).   ·  Plan: -insulin sliding scale (NPO scale)  -c/w home glargine 5U.    Problem/Plan - 6:  ·  Problem: Need for prophylactic measure.     DNR/DNI - MOLST in chart.   89 yo F w/ PMHx of DM, afib (not on AC), HTN, CVA (residual left sided weakness), hypothyroidism presents with acute hypoxic respiratory failure in setting of Covid19 positive.    Problem/Plan - 1:  ·  Problem: Acute respiratory failure with hypoxia and hypercapnia   ·  Plan: - in the setting of COVID19 positive  CT: New moderate bilateral pleural effusions with bilateral lower lung areas of atelectasis, left greater than right, including complete compressive atelectasis of the bilateral lower lobes.  Discussed with pulm: pt does not have COVID pna: stopped decadron.  However, will cont with Remdesivir ..  reviewed - d-dimer values from previous admission: always had been high: change Lovenox to ppx dosing. (BID given her weight)  CTa chest neg for PE, LE dopplers neg for DVT.   BIPAP per pulm as needed.  hypoxia likely sec to pleural effusion/atelectasis  - cont Lasix 40 mg IV daily: f/u cxr and will change to po in the next 24 hrs if cont to improve. cardiology follow up.   CXR: 5/20/22: unchanged small to moderate bilateral pleural effusions.  wean NC O2 as tolerates       Problem/Plan - 2:  ·  Problem: Hypothyroidism.   - c/w home synthroid (can convert to PO if pt swallowing)    Problem/Plan - 3:  ·  Problem: Chronic atrial fibrillation.   ·  Plan: - not on treatment (AC or rate/rhythm control) per outside nursing home documents   -monitor on telemetry.      Problem/Plan - 4:  ·  Problem: Hypertension.   ·  Plan: -monitor BP       Problem/Plan - 5:  ·  Problem: DM (diabetes mellitus).   ·  Plan: -insulin sliding scale (NPO scale)  -c/w home glargine 5U.    Problem/Plan - 6:  ·  Problem: Need for prophylactic measure.     DNR/DNI - MOLST in chart.   89 yo F w/ PMHx of DM, afib (not on AC), HTN, CVA (residual left sided weakness), hypothyroidism presents with acute hypoxic respiratory failure in setting of Covid19 positive.    Problem/Plan - 1:  ·  Problem: Acute respiratory failure with hypoxia and hypercapnia   ·  Plan: - in the setting of COVID19 positive  CT: New moderate bilateral pleural effusions with bilateral lower lung areas of atelectasis, left greater than right, including complete compressive atelectasis of the bilateral lower lobes.  Discussed with pulm: pt does not have COVID pna: stopped decadron.  However, will cont with Remdesivir ..  reviewed - d-dimer values from previous admission: always had been high: change Lovenox to ppx dosing. (BID given her weight)  CTa chest neg for PE, LE dopplers neg for DVT.   BIPAP per pulm as needed.  hypoxia likely sec to pleural effusion/atelectasis  - cont Lasix 40 mg IV daily: f/u cxr and will change to po in the next 24 hrs if cont to improve. cardiology follow up.   CXR: 5/20/22: unchanged small to moderate bilateral pleural effusions.  wean NC O2 as tolerates   Limited TTE on 2/2022: hyperdynamic LV  Given pleural effusion and hypoxia, on diuretics will repeat TTE to evaluate her LV.      Problem/Plan - 2:  ·  Problem: Hypothyroidism.   - c/w home synthroid (can convert to PO if pt swallowing)    Problem/Plan - 3:  ·  Problem: Chronic atrial fibrillation.   ·  Plan: - not on treatment (AC or rate/rhythm control) per outside nursing home documents   -monitor on telemetry.      Problem/Plan - 4:  ·  Problem: Hypertension.   ·  Plan: -monitor BP       Problem/Plan - 5:  ·  Problem: DM (diabetes mellitus).   ·  Plan: -insulin sliding scale (NPO scale)  -c/w home glargine 5U.    Problem/Plan - 6:  ·  Problem: Need for prophylactic measure.     DNR/DNI - MOLST in chart.   No